# Patient Record
Sex: FEMALE | Race: WHITE | NOT HISPANIC OR LATINO | URBAN - METROPOLITAN AREA
[De-identification: names, ages, dates, MRNs, and addresses within clinical notes are randomized per-mention and may not be internally consistent; named-entity substitution may affect disease eponyms.]

---

## 2017-06-05 ENCOUNTER — INPATIENT (INPATIENT)
Facility: HOSPITAL | Age: 67
LOS: 8 days | Discharge: NURSING, REHAB OR LONG-TERM CARE | End: 2017-06-14
Attending: INTERNAL MEDICINE | Admitting: INTERNAL MEDICINE

## 2017-06-05 DIAGNOSIS — R07.9 CHEST PAIN, UNSPECIFIED: ICD-10-CM

## 2017-06-15 ENCOUNTER — OUTPATIENT (OUTPATIENT)
Dept: OUTPATIENT SERVICES | Facility: HOSPITAL | Age: 67
LOS: 1 days | Discharge: HOME | End: 2017-06-15

## 2017-06-15 DIAGNOSIS — R07.9 CHEST PAIN, UNSPECIFIED: ICD-10-CM

## 2017-06-15 PROBLEM — Z00.00 ENCOUNTER FOR PREVENTIVE HEALTH EXAMINATION: Status: ACTIVE | Noted: 2017-06-15

## 2017-06-17 ENCOUNTER — OUTPATIENT (OUTPATIENT)
Dept: OUTPATIENT SERVICES | Facility: HOSPITAL | Age: 67
LOS: 1 days | Discharge: HOME | End: 2017-06-17

## 2017-06-17 DIAGNOSIS — R07.9 CHEST PAIN, UNSPECIFIED: ICD-10-CM

## 2017-06-19 ENCOUNTER — OUTPATIENT (OUTPATIENT)
Dept: OUTPATIENT SERVICES | Facility: HOSPITAL | Age: 67
LOS: 1 days | Discharge: HOME | End: 2017-06-19

## 2017-06-19 DIAGNOSIS — R07.9 CHEST PAIN, UNSPECIFIED: ICD-10-CM

## 2017-06-22 ENCOUNTER — OUTPATIENT (OUTPATIENT)
Dept: OUTPATIENT SERVICES | Facility: HOSPITAL | Age: 67
LOS: 1 days | Discharge: HOME | End: 2017-06-22

## 2017-06-22 DIAGNOSIS — R07.9 CHEST PAIN, UNSPECIFIED: ICD-10-CM

## 2017-06-26 ENCOUNTER — OUTPATIENT (OUTPATIENT)
Dept: OUTPATIENT SERVICES | Facility: HOSPITAL | Age: 67
LOS: 1 days | Discharge: HOME | End: 2017-06-26

## 2017-06-26 DIAGNOSIS — R07.9 CHEST PAIN, UNSPECIFIED: ICD-10-CM

## 2017-06-28 ENCOUNTER — OUTPATIENT (OUTPATIENT)
Dept: OUTPATIENT SERVICES | Facility: HOSPITAL | Age: 67
LOS: 1 days | Discharge: HOME | End: 2017-06-28

## 2017-06-28 DIAGNOSIS — T50.8X5A ADVERSE EFFECT OF DIAGNOSTIC AGENTS, INITIAL ENCOUNTER: ICD-10-CM

## 2017-06-28 DIAGNOSIS — I42.9 CARDIOMYOPATHY, UNSPECIFIED: ICD-10-CM

## 2017-06-28 DIAGNOSIS — N17.0 ACUTE KIDNEY FAILURE WITH TUBULAR NECROSIS: ICD-10-CM

## 2017-06-28 DIAGNOSIS — J44.9 CHRONIC OBSTRUCTIVE PULMONARY DISEASE, UNSPECIFIED: ICD-10-CM

## 2017-06-28 DIAGNOSIS — E83.42 HYPOMAGNESEMIA: ICD-10-CM

## 2017-06-28 DIAGNOSIS — I13.0 HYPERTENSIVE HEART AND CHRONIC KIDNEY DISEASE WITH HEART FAILURE AND STAGE 1 THROUGH STAGE 4 CHRONIC KIDNEY DISEASE, OR UNSPECIFIED CHRONIC KIDNEY DISEASE: ICD-10-CM

## 2017-06-28 DIAGNOSIS — L40.9 PSORIASIS, UNSPECIFIED: ICD-10-CM

## 2017-06-28 DIAGNOSIS — E87.6 HYPOKALEMIA: ICD-10-CM

## 2017-06-28 DIAGNOSIS — N18.3 CHRONIC KIDNEY DISEASE, STAGE 3 (MODERATE): ICD-10-CM

## 2017-06-28 DIAGNOSIS — I21.09 ST ELEVATION (STEMI) MYOCARDIAL INFARCTION INVOLVING OTHER CORONARY ARTERY OF ANTERIOR WALL: ICD-10-CM

## 2017-06-28 DIAGNOSIS — Z90.710 ACQUIRED ABSENCE OF BOTH CERVIX AND UTERUS: ICD-10-CM

## 2017-06-28 DIAGNOSIS — M19.90 UNSPECIFIED OSTEOARTHRITIS, UNSPECIFIED SITE: ICD-10-CM

## 2017-06-28 DIAGNOSIS — R79.9 ABNORMAL FINDING OF BLOOD CHEMISTRY, UNSPECIFIED: ICD-10-CM

## 2017-06-28 DIAGNOSIS — E11.59 TYPE 2 DIABETES MELLITUS WITH OTHER CIRCULATORY COMPLICATIONS: ICD-10-CM

## 2017-06-28 DIAGNOSIS — B96.20 UNSPECIFIED ESCHERICHIA COLI [E. COLI] AS THE CAUSE OF DISEASES CLASSIFIED ELSEWHERE: ICD-10-CM

## 2017-06-28 DIAGNOSIS — N39.0 URINARY TRACT INFECTION, SITE NOT SPECIFIED: ICD-10-CM

## 2017-06-28 DIAGNOSIS — I50.21 ACUTE SYSTOLIC (CONGESTIVE) HEART FAILURE: ICD-10-CM

## 2017-06-28 DIAGNOSIS — Z79.84 LONG TERM (CURRENT) USE OF ORAL HYPOGLYCEMIC DRUGS: ICD-10-CM

## 2017-06-28 DIAGNOSIS — R07.9 CHEST PAIN, UNSPECIFIED: ICD-10-CM

## 2017-06-28 DIAGNOSIS — E66.01 MORBID (SEVERE) OBESITY DUE TO EXCESS CALORIES: ICD-10-CM

## 2017-06-28 DIAGNOSIS — D50.9 IRON DEFICIENCY ANEMIA, UNSPECIFIED: ICD-10-CM

## 2017-06-28 DIAGNOSIS — E11.22 TYPE 2 DIABETES MELLITUS WITH DIABETIC CHRONIC KIDNEY DISEASE: ICD-10-CM

## 2017-06-28 DIAGNOSIS — K57.90 DIVERTICULOSIS OF INTESTINE, PART UNSPECIFIED, WITHOUT PERFORATION OR ABSCESS WITHOUT BLEEDING: ICD-10-CM

## 2017-06-28 DIAGNOSIS — I25.10 ATHEROSCLEROTIC HEART DISEASE OF NATIVE CORONARY ARTERY WITHOUT ANGINA PECTORIS: ICD-10-CM

## 2017-06-29 ENCOUNTER — OUTPATIENT (OUTPATIENT)
Dept: OUTPATIENT SERVICES | Facility: HOSPITAL | Age: 67
LOS: 1 days | Discharge: HOME | End: 2017-06-29

## 2017-06-29 DIAGNOSIS — R07.9 CHEST PAIN, UNSPECIFIED: ICD-10-CM

## 2017-06-29 DIAGNOSIS — R79.9 ABNORMAL FINDING OF BLOOD CHEMISTRY, UNSPECIFIED: ICD-10-CM

## 2017-07-03 ENCOUNTER — OUTPATIENT (OUTPATIENT)
Dept: OUTPATIENT SERVICES | Facility: HOSPITAL | Age: 67
LOS: 1 days | Discharge: HOME | End: 2017-07-03

## 2017-07-03 DIAGNOSIS — R79.9 ABNORMAL FINDING OF BLOOD CHEMISTRY, UNSPECIFIED: ICD-10-CM

## 2017-07-03 DIAGNOSIS — R07.9 CHEST PAIN, UNSPECIFIED: ICD-10-CM

## 2017-07-06 ENCOUNTER — OUTPATIENT (OUTPATIENT)
Dept: OUTPATIENT SERVICES | Facility: HOSPITAL | Age: 67
LOS: 1 days | Discharge: HOME | End: 2017-07-06

## 2017-07-06 DIAGNOSIS — R07.9 CHEST PAIN, UNSPECIFIED: ICD-10-CM

## 2017-07-06 DIAGNOSIS — R79.9 ABNORMAL FINDING OF BLOOD CHEMISTRY, UNSPECIFIED: ICD-10-CM

## 2017-07-10 ENCOUNTER — OUTPATIENT (OUTPATIENT)
Dept: OUTPATIENT SERVICES | Facility: HOSPITAL | Age: 67
LOS: 1 days | Discharge: HOME | End: 2017-07-10

## 2017-07-10 DIAGNOSIS — R07.9 CHEST PAIN, UNSPECIFIED: ICD-10-CM

## 2017-07-10 DIAGNOSIS — L97.909 NON-PRESSURE CHRONIC ULCER OF UNSPECIFIED PART OF UNSPECIFIED LOWER LEG WITH UNSPECIFIED SEVERITY: ICD-10-CM

## 2017-07-10 DIAGNOSIS — R79.9 ABNORMAL FINDING OF BLOOD CHEMISTRY, UNSPECIFIED: ICD-10-CM

## 2017-07-17 ENCOUNTER — OUTPATIENT (OUTPATIENT)
Dept: OUTPATIENT SERVICES | Facility: HOSPITAL | Age: 67
LOS: 1 days | Discharge: HOME | End: 2017-07-17

## 2017-07-17 DIAGNOSIS — R07.9 CHEST PAIN, UNSPECIFIED: ICD-10-CM

## 2017-07-17 DIAGNOSIS — L97.909 NON-PRESSURE CHRONIC ULCER OF UNSPECIFIED PART OF UNSPECIFIED LOWER LEG WITH UNSPECIFIED SEVERITY: ICD-10-CM

## 2017-07-17 DIAGNOSIS — R79.9 ABNORMAL FINDING OF BLOOD CHEMISTRY, UNSPECIFIED: ICD-10-CM

## 2017-07-21 ENCOUNTER — APPOINTMENT (OUTPATIENT)
Dept: CARDIOLOGY | Facility: CLINIC | Age: 67
End: 2017-07-21

## 2017-07-21 VITALS
DIASTOLIC BLOOD PRESSURE: 64 MMHG | HEART RATE: 74 BPM | SYSTOLIC BLOOD PRESSURE: 112 MMHG | WEIGHT: 293 LBS | HEIGHT: 64 IN | BODY MASS INDEX: 50.02 KG/M2

## 2017-07-21 DIAGNOSIS — Z83.3 FAMILY HISTORY OF DIABETES MELLITUS: ICD-10-CM

## 2017-07-21 DIAGNOSIS — Z82.49 FAMILY HISTORY OF ISCHEMIC HEART DISEASE AND OTHER DISEASES OF THE CIRCULATORY SYSTEM: ICD-10-CM

## 2017-07-21 DIAGNOSIS — I25.2 OLD MYOCARDIAL INFARCTION: ICD-10-CM

## 2017-07-21 RX ORDER — MAGNESIUM OXIDE 500 MG
500 TABLET ORAL DAILY
Refills: 0 | Status: ACTIVE | COMMUNITY

## 2017-07-21 RX ORDER — OXYCODONE AND ACETAMINOPHEN 5; 325 MG/1; MG/1
5-325 TABLET ORAL
Refills: 0 | Status: ACTIVE | COMMUNITY

## 2017-07-21 RX ORDER — METOPROLOL TARTRATE 50 MG/1
50 TABLET, FILM COATED ORAL TWICE DAILY
Qty: 180 | Refills: 3 | Status: ACTIVE | COMMUNITY

## 2017-07-21 RX ORDER — ACETAMINOPHEN 325 MG/1
325 TABLET ORAL
Refills: 0 | Status: ACTIVE | COMMUNITY

## 2017-07-21 RX ORDER — FENOFIBRATE 134 MG/1
134 CAPSULE ORAL DAILY
Refills: 0 | Status: ACTIVE | COMMUNITY

## 2017-07-21 RX ORDER — ASPIRIN 81 MG
81 TABLET, DELAYED RELEASE (ENTERIC COATED) ORAL DAILY
Refills: 0 | Status: ACTIVE | COMMUNITY

## 2017-07-21 RX ORDER — BUSPIRONE HYDROCHLORIDE 5 MG/1
5 TABLET ORAL
Refills: 0 | Status: ACTIVE | COMMUNITY

## 2017-07-21 RX ORDER — CITALOPRAM HYDROBROMIDE 10 MG/1
10 TABLET, FILM COATED ORAL DAILY
Refills: 0 | Status: ACTIVE | COMMUNITY

## 2017-07-24 ENCOUNTER — OUTPATIENT (OUTPATIENT)
Dept: OUTPATIENT SERVICES | Facility: HOSPITAL | Age: 67
LOS: 1 days | Discharge: HOME | End: 2017-07-24

## 2017-07-24 DIAGNOSIS — R07.9 CHEST PAIN, UNSPECIFIED: ICD-10-CM

## 2017-07-24 DIAGNOSIS — R79.9 ABNORMAL FINDING OF BLOOD CHEMISTRY, UNSPECIFIED: ICD-10-CM

## 2017-07-24 DIAGNOSIS — L97.909 NON-PRESSURE CHRONIC ULCER OF UNSPECIFIED PART OF UNSPECIFIED LOWER LEG WITH UNSPECIFIED SEVERITY: ICD-10-CM

## 2017-07-25 ENCOUNTER — OUTPATIENT (OUTPATIENT)
Dept: OUTPATIENT SERVICES | Facility: HOSPITAL | Age: 67
LOS: 1 days | Discharge: HOME | End: 2017-07-25

## 2017-07-25 DIAGNOSIS — L97.909 NON-PRESSURE CHRONIC ULCER OF UNSPECIFIED PART OF UNSPECIFIED LOWER LEG WITH UNSPECIFIED SEVERITY: ICD-10-CM

## 2017-07-25 DIAGNOSIS — R07.9 CHEST PAIN, UNSPECIFIED: ICD-10-CM

## 2017-07-25 DIAGNOSIS — E87.5 HYPERKALEMIA: ICD-10-CM

## 2017-07-26 ENCOUNTER — APPOINTMENT (OUTPATIENT)
Dept: CARDIOLOGY | Facility: CLINIC | Age: 67
End: 2017-07-26

## 2017-07-27 DIAGNOSIS — E87.5 HYPERKALEMIA: ICD-10-CM

## 2017-07-27 DIAGNOSIS — I21.3 ST ELEVATION (STEMI) MYOCARDIAL INFARCTION OF UNSPECIFIED SITE: ICD-10-CM

## 2017-07-31 ENCOUNTER — OUTPATIENT (OUTPATIENT)
Dept: OUTPATIENT SERVICES | Facility: HOSPITAL | Age: 67
LOS: 1 days | Discharge: HOME | End: 2017-07-31

## 2017-07-31 DIAGNOSIS — R79.9 ABNORMAL FINDING OF BLOOD CHEMISTRY, UNSPECIFIED: ICD-10-CM

## 2017-07-31 DIAGNOSIS — L97.909 NON-PRESSURE CHRONIC ULCER OF UNSPECIFIED PART OF UNSPECIFIED LOWER LEG WITH UNSPECIFIED SEVERITY: ICD-10-CM

## 2017-07-31 DIAGNOSIS — R07.9 CHEST PAIN, UNSPECIFIED: ICD-10-CM

## 2017-08-07 ENCOUNTER — OUTPATIENT (OUTPATIENT)
Dept: OUTPATIENT SERVICES | Facility: HOSPITAL | Age: 67
LOS: 1 days | Discharge: HOME | End: 2017-08-07

## 2017-08-07 DIAGNOSIS — R07.9 CHEST PAIN, UNSPECIFIED: ICD-10-CM

## 2017-08-07 DIAGNOSIS — R79.9 ABNORMAL FINDING OF BLOOD CHEMISTRY, UNSPECIFIED: ICD-10-CM

## 2017-08-07 DIAGNOSIS — L97.909 NON-PRESSURE CHRONIC ULCER OF UNSPECIFIED PART OF UNSPECIFIED LOWER LEG WITH UNSPECIFIED SEVERITY: ICD-10-CM

## 2017-08-14 ENCOUNTER — OUTPATIENT (OUTPATIENT)
Dept: OUTPATIENT SERVICES | Facility: HOSPITAL | Age: 67
LOS: 1 days | Discharge: HOME | End: 2017-08-14

## 2017-08-14 DIAGNOSIS — R79.9 ABNORMAL FINDING OF BLOOD CHEMISTRY, UNSPECIFIED: ICD-10-CM

## 2017-08-14 DIAGNOSIS — L97.909 NON-PRESSURE CHRONIC ULCER OF UNSPECIFIED PART OF UNSPECIFIED LOWER LEG WITH UNSPECIFIED SEVERITY: ICD-10-CM

## 2017-08-14 DIAGNOSIS — R07.9 CHEST PAIN, UNSPECIFIED: ICD-10-CM

## 2017-08-21 ENCOUNTER — OUTPATIENT (OUTPATIENT)
Dept: OUTPATIENT SERVICES | Facility: HOSPITAL | Age: 67
LOS: 1 days | Discharge: HOME | End: 2017-08-21

## 2017-08-21 DIAGNOSIS — L97.909 NON-PRESSURE CHRONIC ULCER OF UNSPECIFIED PART OF UNSPECIFIED LOWER LEG WITH UNSPECIFIED SEVERITY: ICD-10-CM

## 2017-08-21 DIAGNOSIS — R07.9 CHEST PAIN, UNSPECIFIED: ICD-10-CM

## 2017-08-21 DIAGNOSIS — R79.9 ABNORMAL FINDING OF BLOOD CHEMISTRY, UNSPECIFIED: ICD-10-CM

## 2017-08-26 RX ORDER — CLOPIDOGREL BISULFATE 75 MG/1
75 TABLET, FILM COATED ORAL DAILY
Qty: 90 | Refills: 3 | Status: ACTIVE | COMMUNITY
Start: 2017-08-26 | End: 1900-01-01

## 2017-11-10 ENCOUNTER — APPOINTMENT (OUTPATIENT)
Dept: CARDIOLOGY | Facility: CLINIC | Age: 67
End: 2017-11-10

## 2017-11-27 ENCOUNTER — INPATIENT (INPATIENT)
Facility: HOSPITAL | Age: 67
LOS: 6 days | Discharge: NURSING, REHAB OR LONG-TERM CARE | End: 2017-12-04
Attending: INTERNAL MEDICINE | Admitting: INTERNAL MEDICINE

## 2017-11-27 DIAGNOSIS — L97.909 NON-PRESSURE CHRONIC ULCER OF UNSPECIFIED PART OF UNSPECIFIED LOWER LEG WITH UNSPECIFIED SEVERITY: ICD-10-CM

## 2017-11-27 DIAGNOSIS — R07.9 CHEST PAIN, UNSPECIFIED: ICD-10-CM

## 2017-12-07 DIAGNOSIS — R06.02 SHORTNESS OF BREATH: ICD-10-CM

## 2017-12-07 DIAGNOSIS — E66.01 MORBID (SEVERE) OBESITY DUE TO EXCESS CALORIES: ICD-10-CM

## 2017-12-07 DIAGNOSIS — R26.0 ATAXIC GAIT: ICD-10-CM

## 2017-12-07 DIAGNOSIS — I50.32 CHRONIC DIASTOLIC (CONGESTIVE) HEART FAILURE: ICD-10-CM

## 2017-12-07 DIAGNOSIS — K57.90 DIVERTICULOSIS OF INTESTINE, PART UNSPECIFIED, WITHOUT PERFORATION OR ABSCESS WITHOUT BLEEDING: ICD-10-CM

## 2017-12-07 DIAGNOSIS — E11.22 TYPE 2 DIABETES MELLITUS WITH DIABETIC CHRONIC KIDNEY DISEASE: ICD-10-CM

## 2017-12-07 DIAGNOSIS — N18.3 CHRONIC KIDNEY DISEASE, STAGE 3 (MODERATE): ICD-10-CM

## 2017-12-07 DIAGNOSIS — M16.11 UNILATERAL PRIMARY OSTEOARTHRITIS, RIGHT HIP: ICD-10-CM

## 2017-12-07 DIAGNOSIS — J44.9 CHRONIC OBSTRUCTIVE PULMONARY DISEASE, UNSPECIFIED: ICD-10-CM

## 2017-12-07 DIAGNOSIS — R53.81 OTHER MALAISE: ICD-10-CM

## 2017-12-07 DIAGNOSIS — I25.10 ATHEROSCLEROTIC HEART DISEASE OF NATIVE CORONARY ARTERY WITHOUT ANGINA PECTORIS: ICD-10-CM

## 2017-12-07 DIAGNOSIS — I87.8 OTHER SPECIFIED DISORDERS OF VEINS: ICD-10-CM

## 2017-12-07 DIAGNOSIS — L97.911 NON-PRESSURE CHRONIC ULCER OF UNSPECIFIED PART OF RIGHT LOWER LEG LIMITED TO BREAKDOWN OF SKIN: ICD-10-CM

## 2017-12-07 DIAGNOSIS — G89.29 OTHER CHRONIC PAIN: ICD-10-CM

## 2017-12-07 DIAGNOSIS — L40.9 PSORIASIS, UNSPECIFIED: ICD-10-CM

## 2017-12-07 DIAGNOSIS — I13.0 HYPERTENSIVE HEART AND CHRONIC KIDNEY DISEASE WITH HEART FAILURE AND STAGE 1 THROUGH STAGE 4 CHRONIC KIDNEY DISEASE, OR UNSPECIFIED CHRONIC KIDNEY DISEASE: ICD-10-CM

## 2017-12-07 DIAGNOSIS — I83.019 VARICOSE VEINS OF RIGHT LOWER EXTREMITY WITH ULCER OF UNSPECIFIED SITE: ICD-10-CM

## 2017-12-07 DIAGNOSIS — Z99.3 DEPENDENCE ON WHEELCHAIR: ICD-10-CM

## 2017-12-07 DIAGNOSIS — N18.4 CHRONIC KIDNEY DISEASE, STAGE 4 (SEVERE): ICD-10-CM

## 2017-12-07 DIAGNOSIS — I25.2 OLD MYOCARDIAL INFARCTION: ICD-10-CM

## 2017-12-09 DIAGNOSIS — I87.319 CHRONIC VENOUS HYPERTENSION (IDIOPATHIC) WITH ULCER OF UNSPECIFIED LOWER EXTREMITY: ICD-10-CM

## 2017-12-22 ENCOUNTER — APPOINTMENT (OUTPATIENT)
Dept: CARDIOLOGY | Facility: CLINIC | Age: 67
End: 2017-12-22

## 2018-05-04 ENCOUNTER — APPOINTMENT (OUTPATIENT)
Dept: CARDIOLOGY | Facility: CLINIC | Age: 68
End: 2018-05-04

## 2018-05-04 VITALS
HEART RATE: 63 BPM | BODY MASS INDEX: 41.15 KG/M2 | HEIGHT: 64 IN | SYSTOLIC BLOOD PRESSURE: 116 MMHG | DIASTOLIC BLOOD PRESSURE: 70 MMHG | WEIGHT: 241 LBS

## 2018-05-04 RX ORDER — ATORVASTATIN CALCIUM 40 MG/1
40 TABLET, FILM COATED ORAL
Qty: 30 | Refills: 0 | Status: ACTIVE | COMMUNITY
Start: 2018-02-09

## 2018-05-04 RX ORDER — FOLIC ACID 1 MG/1
1 TABLET ORAL DAILY
Refills: 0 | Status: ACTIVE | COMMUNITY

## 2018-05-04 RX ORDER — LOSARTAN POTASSIUM 25 MG/1
25 TABLET, FILM COATED ORAL DAILY
Qty: 30 | Refills: 3 | Status: DISCONTINUED | COMMUNITY
Start: 2017-07-21 | End: 2018-05-04

## 2018-05-04 RX ORDER — CETIRIZINE HYDROCHLORIDE 10 MG/1
10 CAPSULE, LIQUID FILLED ORAL DAILY
Refills: 0 | Status: ACTIVE | COMMUNITY

## 2018-05-04 RX ORDER — FUROSEMIDE 20 MG/1
20 TABLET ORAL DAILY
Qty: 90 | Refills: 3 | Status: DISCONTINUED | COMMUNITY
End: 2018-05-04

## 2018-05-04 RX ORDER — GLIPIZIDE 10 MG/1
10 TABLET ORAL DAILY
Refills: 0 | Status: DISCONTINUED | COMMUNITY
End: 2018-05-04

## 2018-05-04 RX ORDER — FLUTICASONE PROPIONATE AND SALMETEROL 50; 250 UG/1; UG/1
250-50 POWDER RESPIRATORY (INHALATION)
Qty: 180 | Refills: 0 | Status: ACTIVE | COMMUNITY
Start: 2017-09-20

## 2018-05-04 RX ORDER — ALBUTEROL SULFATE 90 UG/1
108 (90 BASE) AEROSOL, METERED RESPIRATORY (INHALATION)
Qty: 25 | Refills: 0 | Status: ACTIVE | COMMUNITY
Start: 2017-09-20

## 2018-05-04 RX ORDER — FLUTICASONE FUROATE AND VILANTEROL TRIFENATATE 100; 25 UG/1; UG/1
100-25 POWDER RESPIRATORY (INHALATION)
Qty: 60 | Refills: 0 | Status: ACTIVE | COMMUNITY
Start: 2016-06-20

## 2018-05-04 RX ORDER — OXYCODONE HYDROCHLORIDE 10 MG/1
10 TABLET, EXTENDED RELEASE ORAL
Qty: 28 | Refills: 0 | Status: ACTIVE | COMMUNITY
Start: 2018-02-09

## 2018-05-04 RX ORDER — SENNOSIDES 8.6 MG
8.6 TABLET ORAL
Refills: 0 | Status: ACTIVE | COMMUNITY

## 2018-05-04 RX ORDER — OXYCODONE HYDROCHLORIDE 10 MG/1
10 TABLET, FILM COATED, EXTENDED RELEASE ORAL
Refills: 0 | Status: ACTIVE | COMMUNITY

## 2018-11-02 ENCOUNTER — APPOINTMENT (OUTPATIENT)
Dept: CARDIOLOGY | Facility: CLINIC | Age: 68
End: 2018-11-02

## 2018-11-02 VITALS
BODY MASS INDEX: 38.41 KG/M2 | HEIGHT: 64 IN | HEART RATE: 61 BPM | WEIGHT: 225 LBS | DIASTOLIC BLOOD PRESSURE: 80 MMHG | SYSTOLIC BLOOD PRESSURE: 138 MMHG

## 2018-11-02 DIAGNOSIS — E11.9 TYPE 2 DIABETES MELLITUS W/OUT COMPLICATIONS: ICD-10-CM

## 2018-11-02 DIAGNOSIS — I10 ESSENTIAL (PRIMARY) HYPERTENSION: ICD-10-CM

## 2018-11-02 DIAGNOSIS — I25.10 ATHEROSCLEROTIC HEART DISEASE OF NATIVE CORONARY ARTERY W/OUT ANGINA PECTORIS: ICD-10-CM

## 2018-11-02 DIAGNOSIS — E78.5 HYPERLIPIDEMIA, UNSPECIFIED: ICD-10-CM

## 2019-01-01 ENCOUNTER — OUTPATIENT (OUTPATIENT)
Dept: OUTPATIENT SERVICES | Facility: HOSPITAL | Age: 69
LOS: 1 days | Discharge: HOME | End: 2019-01-01

## 2019-01-01 ENCOUNTER — APPOINTMENT (OUTPATIENT)
Dept: CARDIOLOGY | Facility: CLINIC | Age: 69
End: 2019-01-01

## 2019-01-01 DIAGNOSIS — I10 ESSENTIAL (PRIMARY) HYPERTENSION: ICD-10-CM

## 2019-01-01 DIAGNOSIS — E78.2 MIXED HYPERLIPIDEMIA: ICD-10-CM

## 2019-01-01 DIAGNOSIS — E11.9 TYPE 2 DIABETES MELLITUS WITHOUT COMPLICATIONS: ICD-10-CM

## 2019-01-01 DIAGNOSIS — D64.9 ANEMIA, UNSPECIFIED: ICD-10-CM

## 2019-01-01 DIAGNOSIS — R79.9 ABNORMAL FINDING OF BLOOD CHEMISTRY, UNSPECIFIED: ICD-10-CM

## 2019-04-01 ENCOUNTER — OUTPATIENT (OUTPATIENT)
Dept: OUTPATIENT SERVICES | Facility: HOSPITAL | Age: 69
LOS: 1 days | Discharge: HOME | End: 2019-04-01

## 2019-04-01 DIAGNOSIS — E11.9 TYPE 2 DIABETES MELLITUS WITHOUT COMPLICATIONS: ICD-10-CM

## 2019-04-01 DIAGNOSIS — E66.01 MORBID (SEVERE) OBESITY DUE TO EXCESS CALORIES: ICD-10-CM

## 2019-04-01 DIAGNOSIS — I10 ESSENTIAL (PRIMARY) HYPERTENSION: ICD-10-CM

## 2019-05-03 ENCOUNTER — APPOINTMENT (OUTPATIENT)
Dept: CARDIOLOGY | Facility: CLINIC | Age: 69
End: 2019-05-03

## 2020-01-01 ENCOUNTER — APPOINTMENT (OUTPATIENT)
Dept: CARDIOLOGY | Facility: CLINIC | Age: 70
End: 2020-01-01

## 2020-01-01 ENCOUNTER — RESULT REVIEW (OUTPATIENT)
Age: 70
End: 2020-01-01

## 2020-01-01 ENCOUNTER — INPATIENT (INPATIENT)
Facility: HOSPITAL | Age: 70
LOS: 20 days | End: 2020-05-04
Attending: INTERNAL MEDICINE | Admitting: INTERNAL MEDICINE
Payer: MEDICARE

## 2020-01-01 VITALS
SYSTOLIC BLOOD PRESSURE: 153 MMHG | DIASTOLIC BLOOD PRESSURE: 89 MMHG | OXYGEN SATURATION: 86 % | RESPIRATION RATE: 25 BRPM | HEART RATE: 117 BPM | TEMPERATURE: 98 F

## 2020-01-01 VITALS — HEART RATE: 34 BPM

## 2020-01-01 DIAGNOSIS — J15.6 PNEUMONIA DUE TO OTHER GRAM-NEGATIVE BACTERIA: ICD-10-CM

## 2020-01-01 DIAGNOSIS — J12.89 OTHER VIRAL PNEUMONIA: ICD-10-CM

## 2020-01-01 DIAGNOSIS — U07.1 COVID-19: ICD-10-CM

## 2020-01-01 DIAGNOSIS — I25.10 ATHEROSCLEROTIC HEART DISEASE OF NATIVE CORONARY ARTERY WITHOUT ANGINA PECTORIS: ICD-10-CM

## 2020-01-01 DIAGNOSIS — T38.0X5A ADVERSE EFFECT OF GLUCOCORTICOIDS AND SYNTHETIC ANALOGUES, INITIAL ENCOUNTER: ICD-10-CM

## 2020-01-01 DIAGNOSIS — I25.2 OLD MYOCARDIAL INFARCTION: ICD-10-CM

## 2020-01-01 DIAGNOSIS — J45.901 UNSPECIFIED ASTHMA WITH (ACUTE) EXACERBATION: ICD-10-CM

## 2020-01-01 DIAGNOSIS — T50.995A ADVERSE EFFECT OF OTHER DRUGS, MEDICAMENTS AND BIOLOGICAL SUBSTANCES, INITIAL ENCOUNTER: ICD-10-CM

## 2020-01-01 DIAGNOSIS — N17.0 ACUTE KIDNEY FAILURE WITH TUBULAR NECROSIS: ICD-10-CM

## 2020-01-01 DIAGNOSIS — I50.23 ACUTE ON CHRONIC SYSTOLIC (CONGESTIVE) HEART FAILURE: ICD-10-CM

## 2020-01-01 DIAGNOSIS — I48.0 PAROXYSMAL ATRIAL FIBRILLATION: ICD-10-CM

## 2020-01-01 DIAGNOSIS — E78.5 HYPERLIPIDEMIA, UNSPECIFIED: ICD-10-CM

## 2020-01-01 DIAGNOSIS — I26.99 OTHER PULMONARY EMBOLISM WITHOUT ACUTE COR PULMONALE: ICD-10-CM

## 2020-01-01 DIAGNOSIS — R65.21 SEVERE SEPSIS WITH SEPTIC SHOCK: ICD-10-CM

## 2020-01-01 DIAGNOSIS — R79.1 ABNORMAL COAGULATION PROFILE: ICD-10-CM

## 2020-01-01 DIAGNOSIS — I11.0 HYPERTENSIVE HEART DISEASE WITH HEART FAILURE: ICD-10-CM

## 2020-01-01 DIAGNOSIS — E87.4 MIXED DISORDER OF ACID-BASE BALANCE: ICD-10-CM

## 2020-01-01 DIAGNOSIS — Z95.5 PRESENCE OF CORONARY ANGIOPLASTY IMPLANT AND GRAFT: ICD-10-CM

## 2020-01-01 DIAGNOSIS — E87.5 HYPERKALEMIA: ICD-10-CM

## 2020-01-01 DIAGNOSIS — I27.20 PULMONARY HYPERTENSION, UNSPECIFIED: ICD-10-CM

## 2020-01-01 DIAGNOSIS — J98.11 ATELECTASIS: ICD-10-CM

## 2020-01-01 DIAGNOSIS — D64.9 ANEMIA, UNSPECIFIED: ICD-10-CM

## 2020-01-01 DIAGNOSIS — A41.89 OTHER SPECIFIED SEPSIS: ICD-10-CM

## 2020-01-01 DIAGNOSIS — I45.10 UNSPECIFIED RIGHT BUNDLE-BRANCH BLOCK: ICD-10-CM

## 2020-01-01 DIAGNOSIS — J80 ACUTE RESPIRATORY DISTRESS SYNDROME: ICD-10-CM

## 2020-01-01 DIAGNOSIS — Z00.6 ENCOUNTER FOR EXAMINATION FOR NORMAL COMPARISON AND CONTROL IN CLINICAL RESEARCH PROGRAM: ICD-10-CM

## 2020-01-01 DIAGNOSIS — E11.9 TYPE 2 DIABETES MELLITUS WITHOUT COMPLICATIONS: ICD-10-CM

## 2020-01-01 LAB
A1C WITH ESTIMATED AVERAGE GLUCOSE RESULT: 5.8 % — HIGH (ref 4–5.6)
ABO RH CONFIRMATION: SIGNIFICANT CHANGE UP
ALBUMIN SERPL ELPH-MCNC: 2.3 G/DL — LOW (ref 3.5–5.2)
ALBUMIN SERPL ELPH-MCNC: 2.3 G/DL — LOW (ref 3.5–5.2)
ALBUMIN SERPL ELPH-MCNC: 2.4 G/DL — LOW (ref 3.5–5.2)
ALBUMIN SERPL ELPH-MCNC: 2.6 G/DL — LOW (ref 3.5–5.2)
ALBUMIN SERPL ELPH-MCNC: 2.7 G/DL — LOW (ref 3.5–5.2)
ALBUMIN SERPL ELPH-MCNC: 2.8 G/DL — LOW (ref 3.5–5.2)
ALBUMIN SERPL ELPH-MCNC: 2.9 G/DL — LOW (ref 3.5–5.2)
ALBUMIN SERPL ELPH-MCNC: 2.9 G/DL — LOW (ref 3.5–5.2)
ALBUMIN SERPL ELPH-MCNC: 3 G/DL — LOW (ref 3.5–5.2)
ALBUMIN SERPL ELPH-MCNC: 3.1 G/DL — LOW (ref 3.5–5.2)
ALBUMIN SERPL ELPH-MCNC: 3.3 G/DL — LOW (ref 3.5–5.2)
ALBUMIN SERPL ELPH-MCNC: 3.4 G/DL — LOW (ref 3.5–5.2)
ALBUMIN SERPL ELPH-MCNC: 3.4 G/DL — LOW (ref 3.5–5.2)
ALBUMIN SERPL ELPH-MCNC: 3.5 G/DL — SIGNIFICANT CHANGE UP (ref 3.5–5.2)
ALBUMIN SERPL ELPH-MCNC: 3.7 G/DL — SIGNIFICANT CHANGE UP (ref 3.5–5.2)
ALBUMIN SERPL ELPH-MCNC: 3.8 G/DL — SIGNIFICANT CHANGE UP (ref 3.5–5.2)
ALP SERPL-CCNC: 39 U/L — SIGNIFICANT CHANGE UP (ref 30–115)
ALP SERPL-CCNC: 41 U/L — SIGNIFICANT CHANGE UP (ref 30–115)
ALP SERPL-CCNC: 41 U/L — SIGNIFICANT CHANGE UP (ref 30–115)
ALP SERPL-CCNC: 42 U/L — SIGNIFICANT CHANGE UP (ref 30–115)
ALP SERPL-CCNC: 43 U/L — SIGNIFICANT CHANGE UP (ref 30–115)
ALP SERPL-CCNC: 45 U/L — SIGNIFICANT CHANGE UP (ref 30–115)
ALP SERPL-CCNC: 46 U/L — SIGNIFICANT CHANGE UP (ref 30–115)
ALP SERPL-CCNC: 47 U/L — SIGNIFICANT CHANGE UP (ref 30–115)
ALP SERPL-CCNC: 47 U/L — SIGNIFICANT CHANGE UP (ref 30–115)
ALP SERPL-CCNC: 48 U/L — SIGNIFICANT CHANGE UP (ref 30–115)
ALP SERPL-CCNC: 49 U/L — SIGNIFICANT CHANGE UP (ref 30–115)
ALP SERPL-CCNC: 51 U/L — SIGNIFICANT CHANGE UP (ref 30–115)
ALP SERPL-CCNC: 51 U/L — SIGNIFICANT CHANGE UP (ref 30–115)
ALP SERPL-CCNC: 52 U/L — SIGNIFICANT CHANGE UP (ref 30–115)
ALP SERPL-CCNC: 53 U/L — SIGNIFICANT CHANGE UP (ref 30–115)
ALP SERPL-CCNC: 56 U/L — SIGNIFICANT CHANGE UP (ref 30–115)
ALP SERPL-CCNC: 56 U/L — SIGNIFICANT CHANGE UP (ref 30–115)
ALP SERPL-CCNC: 57 U/L — SIGNIFICANT CHANGE UP (ref 30–115)
ALP SERPL-CCNC: 58 U/L — SIGNIFICANT CHANGE UP (ref 30–115)
ALP SERPL-CCNC: 60 U/L — SIGNIFICANT CHANGE UP (ref 30–115)
ALP SERPL-CCNC: 79 U/L — SIGNIFICANT CHANGE UP (ref 30–115)
ALT FLD-CCNC: 10 U/L — SIGNIFICANT CHANGE UP (ref 0–41)
ALT FLD-CCNC: 11 U/L — SIGNIFICANT CHANGE UP (ref 0–41)
ALT FLD-CCNC: 12 U/L — SIGNIFICANT CHANGE UP (ref 0–41)
ALT FLD-CCNC: 13 U/L — SIGNIFICANT CHANGE UP (ref 0–41)
ALT FLD-CCNC: 14 U/L — SIGNIFICANT CHANGE UP (ref 0–41)
ALT FLD-CCNC: 14 U/L — SIGNIFICANT CHANGE UP (ref 0–41)
ALT FLD-CCNC: 15 U/L — SIGNIFICANT CHANGE UP (ref 0–41)
ALT FLD-CCNC: 15 U/L — SIGNIFICANT CHANGE UP (ref 0–41)
ALT FLD-CCNC: 7 U/L — SIGNIFICANT CHANGE UP (ref 0–41)
ALT FLD-CCNC: 8 U/L — SIGNIFICANT CHANGE UP (ref 0–41)
ALT FLD-CCNC: 8 U/L — SIGNIFICANT CHANGE UP (ref 0–41)
ALT FLD-CCNC: 9 U/L — SIGNIFICANT CHANGE UP (ref 0–41)
ALT FLD-CCNC: 9 U/L — SIGNIFICANT CHANGE UP (ref 0–41)
ANION GAP SERPL CALC-SCNC: 12 MMOL/L — SIGNIFICANT CHANGE UP (ref 7–14)
ANION GAP SERPL CALC-SCNC: 12 MMOL/L — SIGNIFICANT CHANGE UP (ref 7–14)
ANION GAP SERPL CALC-SCNC: 13 MMOL/L — SIGNIFICANT CHANGE UP (ref 7–14)
ANION GAP SERPL CALC-SCNC: 13 MMOL/L — SIGNIFICANT CHANGE UP (ref 7–14)
ANION GAP SERPL CALC-SCNC: 14 MMOL/L — SIGNIFICANT CHANGE UP (ref 7–14)
ANION GAP SERPL CALC-SCNC: 14 MMOL/L — SIGNIFICANT CHANGE UP (ref 7–14)
ANION GAP SERPL CALC-SCNC: 15 MMOL/L — HIGH (ref 7–14)
ANION GAP SERPL CALC-SCNC: 16 MMOL/L — HIGH (ref 7–14)
ANION GAP SERPL CALC-SCNC: 17 MMOL/L — HIGH (ref 7–14)
ANION GAP SERPL CALC-SCNC: 17 MMOL/L — HIGH (ref 7–14)
ANION GAP SERPL CALC-SCNC: 18 MMOL/L — HIGH (ref 7–14)
ANION GAP SERPL CALC-SCNC: 19 MMOL/L — HIGH (ref 7–14)
ANION GAP SERPL CALC-SCNC: 20 MMOL/L — HIGH (ref 7–14)
ANION GAP SERPL CALC-SCNC: 21 MMOL/L — HIGH (ref 7–14)
ANION GAP SERPL CALC-SCNC: 22 MMOL/L — HIGH (ref 7–14)
ANION GAP SERPL CALC-SCNC: 23 MMOL/L — HIGH (ref 7–14)
ANION GAP SERPL CALC-SCNC: 24 MMOL/L — HIGH (ref 7–14)
ANISOCYTOSIS BLD QL: SIGNIFICANT CHANGE UP
ANISOCYTOSIS BLD QL: SLIGHT — SIGNIFICANT CHANGE UP
APPEARANCE UR: ABNORMAL
APPEARANCE UR: CLEAR — SIGNIFICANT CHANGE UP
APTT BLD: 195.7 SEC — CRITICAL HIGH (ref 27–39.2)
APTT BLD: 27.5 SEC — SIGNIFICANT CHANGE UP (ref 27–39.2)
APTT BLD: 28.7 SEC — SIGNIFICANT CHANGE UP (ref 27–39.2)
APTT BLD: 30.1 SEC — SIGNIFICANT CHANGE UP (ref 27–39.2)
APTT BLD: 43.1 SEC — HIGH (ref 27–39.2)
APTT BLD: 44.9 SEC — HIGH (ref 27–39.2)
APTT BLD: 53.7 SEC — HIGH (ref 27–39.2)
APTT BLD: 54.8 SEC — HIGH (ref 27–39.2)
APTT BLD: 56.4 SEC — HIGH (ref 27–39.2)
APTT BLD: 58.5 SEC — HIGH (ref 27–39.2)
APTT BLD: 58.6 SEC — HIGH (ref 27–39.2)
APTT BLD: 58.9 SEC — HIGH (ref 27–39.2)
APTT BLD: 59.6 SEC — HIGH (ref 27–39.2)
APTT BLD: 61.7 SEC — HIGH (ref 27–39.2)
APTT BLD: 62.8 SEC — HIGH (ref 27–39.2)
APTT BLD: 64.5 SEC — HIGH (ref 27–39.2)
APTT BLD: 64.8 SEC — HIGH (ref 27–39.2)
APTT BLD: 66.4 SEC — HIGH (ref 27–39.2)
APTT BLD: 73.4 SEC — CRITICAL HIGH (ref 27–39.2)
APTT BLD: 73.6 SEC — CRITICAL HIGH (ref 27–39.2)
APTT BLD: 77 SEC — CRITICAL HIGH (ref 27–39.2)
APTT BLD: 83.2 SEC — CRITICAL HIGH (ref 27–39.2)
APTT BLD: 83.7 SEC — CRITICAL HIGH (ref 27–39.2)
APTT BLD: 85.8 SEC — CRITICAL HIGH (ref 27–39.2)
APTT BLD: >200 SEC — CRITICAL HIGH (ref 27–39.2)
AST SERPL-CCNC: 12 U/L — SIGNIFICANT CHANGE UP (ref 0–41)
AST SERPL-CCNC: 13 U/L — SIGNIFICANT CHANGE UP (ref 0–41)
AST SERPL-CCNC: 14 U/L — SIGNIFICANT CHANGE UP (ref 0–41)
AST SERPL-CCNC: 14 U/L — SIGNIFICANT CHANGE UP (ref 0–41)
AST SERPL-CCNC: 16 U/L — SIGNIFICANT CHANGE UP (ref 0–41)
AST SERPL-CCNC: 18 U/L — SIGNIFICANT CHANGE UP (ref 0–41)
AST SERPL-CCNC: 19 U/L — SIGNIFICANT CHANGE UP (ref 0–41)
AST SERPL-CCNC: 19 U/L — SIGNIFICANT CHANGE UP (ref 0–41)
AST SERPL-CCNC: 20 U/L — SIGNIFICANT CHANGE UP (ref 0–41)
AST SERPL-CCNC: 21 U/L — SIGNIFICANT CHANGE UP (ref 0–41)
AST SERPL-CCNC: 22 U/L — SIGNIFICANT CHANGE UP (ref 0–41)
AST SERPL-CCNC: 25 U/L — SIGNIFICANT CHANGE UP (ref 0–41)
AST SERPL-CCNC: 25 U/L — SIGNIFICANT CHANGE UP (ref 0–41)
AST SERPL-CCNC: 26 U/L — SIGNIFICANT CHANGE UP (ref 0–41)
AST SERPL-CCNC: 27 U/L — SIGNIFICANT CHANGE UP (ref 0–41)
AST SERPL-CCNC: 29 U/L — SIGNIFICANT CHANGE UP (ref 0–41)
AST SERPL-CCNC: 31 U/L — SIGNIFICANT CHANGE UP (ref 0–41)
AST SERPL-CCNC: 32 U/L — SIGNIFICANT CHANGE UP (ref 0–41)
AST SERPL-CCNC: 38 U/L — SIGNIFICANT CHANGE UP (ref 0–41)
BACTERIA # UR AUTO: ABNORMAL
BASE EXCESS BLDA CALC-SCNC: -0.5 MMOL/L — SIGNIFICANT CHANGE UP (ref -2–2)
BASE EXCESS BLDA CALC-SCNC: -0.6 MMOL/L — SIGNIFICANT CHANGE UP (ref -2–2)
BASE EXCESS BLDA CALC-SCNC: -0.7 MMOL/L — SIGNIFICANT CHANGE UP (ref -2–2)
BASE EXCESS BLDA CALC-SCNC: -10.1 MMOL/L — LOW (ref -2–2)
BASE EXCESS BLDA CALC-SCNC: -12.3 MMOL/L — LOW (ref -2–2)
BASE EXCESS BLDA CALC-SCNC: -13.9 MMOL/L — LOW (ref -2–2)
BASE EXCESS BLDA CALC-SCNC: -2.4 MMOL/L — LOW (ref -2–2)
BASE EXCESS BLDA CALC-SCNC: -2.4 MMOL/L — LOW (ref -2–2)
BASE EXCESS BLDA CALC-SCNC: -3 MMOL/L — LOW (ref -2–2)
BASE EXCESS BLDA CALC-SCNC: -4.5 MMOL/L — LOW (ref -2–2)
BASE EXCESS BLDA CALC-SCNC: -4.7 MMOL/L — LOW (ref -2–2)
BASE EXCESS BLDA CALC-SCNC: -4.7 MMOL/L — LOW (ref -2–2)
BASE EXCESS BLDA CALC-SCNC: -6.2 MMOL/L — LOW (ref -2–2)
BASE EXCESS BLDA CALC-SCNC: -6.3 MMOL/L — LOW (ref -2–2)
BASE EXCESS BLDA CALC-SCNC: -6.4 MMOL/L — LOW (ref -2–2)
BASE EXCESS BLDA CALC-SCNC: -9.8 MMOL/L — LOW (ref -2–2)
BASE EXCESS BLDA CALC-SCNC: 0.9 MMOL/L — SIGNIFICANT CHANGE UP (ref -2–2)
BASE EXCESS BLDA CALC-SCNC: 2.2 MMOL/L — HIGH (ref -2–2)
BASE EXCESS BLDA CALC-SCNC: 3.8 MMOL/L — HIGH (ref -2–2)
BASE EXCESS BLDA CALC-SCNC: 4.3 MMOL/L — HIGH (ref -2–2)
BASE EXCESS BLDA CALC-SCNC: 7.3 MMOL/L — HIGH (ref -2–2)
BASOPHILS # BLD AUTO: 0 K/UL — SIGNIFICANT CHANGE UP (ref 0–0.2)
BASOPHILS # BLD AUTO: 0.01 K/UL — SIGNIFICANT CHANGE UP (ref 0–0.2)
BASOPHILS # BLD AUTO: 0.02 K/UL — SIGNIFICANT CHANGE UP (ref 0–0.2)
BASOPHILS # BLD AUTO: 0.03 K/UL — SIGNIFICANT CHANGE UP (ref 0–0.2)
BASOPHILS # BLD AUTO: 0.04 K/UL — SIGNIFICANT CHANGE UP (ref 0–0.2)
BASOPHILS # BLD AUTO: 0.05 K/UL — SIGNIFICANT CHANGE UP (ref 0–0.2)
BASOPHILS # BLD AUTO: 0.07 K/UL — SIGNIFICANT CHANGE UP (ref 0–0.2)
BASOPHILS NFR BLD AUTO: 0 % — SIGNIFICANT CHANGE UP (ref 0–1)
BASOPHILS NFR BLD AUTO: 0.1 % — SIGNIFICANT CHANGE UP (ref 0–1)
BASOPHILS NFR BLD AUTO: 0.2 % — SIGNIFICANT CHANGE UP (ref 0–1)
BASOPHILS NFR BLD AUTO: 0.3 % — SIGNIFICANT CHANGE UP (ref 0–1)
BILIRUB DIRECT SERPL-MCNC: 0.5 MG/DL — HIGH (ref 0–0.2)
BILIRUB DIRECT SERPL-MCNC: 0.5 MG/DL — HIGH (ref 0–0.2)
BILIRUB INDIRECT FLD-MCNC: 0.1 MG/DL — LOW (ref 0.2–1.2)
BILIRUB INDIRECT FLD-MCNC: 0.1 MG/DL — LOW (ref 0.2–1.2)
BILIRUB SERPL-MCNC: 0.4 MG/DL — SIGNIFICANT CHANGE UP (ref 0.2–1.2)
BILIRUB SERPL-MCNC: 0.5 MG/DL — SIGNIFICANT CHANGE UP (ref 0.2–1.2)
BILIRUB SERPL-MCNC: 0.6 MG/DL — SIGNIFICANT CHANGE UP (ref 0.2–1.2)
BILIRUB SERPL-MCNC: 0.7 MG/DL — SIGNIFICANT CHANGE UP (ref 0.2–1.2)
BILIRUB SERPL-MCNC: 0.7 MG/DL — SIGNIFICANT CHANGE UP (ref 0.2–1.2)
BILIRUB SERPL-MCNC: 0.8 MG/DL — SIGNIFICANT CHANGE UP (ref 0.2–1.2)
BILIRUB SERPL-MCNC: 0.8 MG/DL — SIGNIFICANT CHANGE UP (ref 0.2–1.2)
BILIRUB SERPL-MCNC: 0.9 MG/DL — SIGNIFICANT CHANGE UP (ref 0.2–1.2)
BILIRUB UR-MCNC: NEGATIVE — SIGNIFICANT CHANGE UP
BLD GP AB SCN SERPL QL: SIGNIFICANT CHANGE UP
BLD GP AB SCN SERPL QL: SIGNIFICANT CHANGE UP
BUN SERPL-MCNC: 100 MG/DL — CRITICAL HIGH (ref 10–20)
BUN SERPL-MCNC: 104 MG/DL — CRITICAL HIGH (ref 10–20)
BUN SERPL-MCNC: 104 MG/DL — CRITICAL HIGH (ref 10–20)
BUN SERPL-MCNC: 116 MG/DL — CRITICAL HIGH (ref 10–20)
BUN SERPL-MCNC: 120 MG/DL — CRITICAL HIGH (ref 10–20)
BUN SERPL-MCNC: 120 MG/DL — CRITICAL HIGH (ref 10–20)
BUN SERPL-MCNC: 123 MG/DL — CRITICAL HIGH (ref 10–20)
BUN SERPL-MCNC: 124 MG/DL — CRITICAL HIGH (ref 10–20)
BUN SERPL-MCNC: 126 MG/DL — CRITICAL HIGH (ref 10–20)
BUN SERPL-MCNC: 40 MG/DL — HIGH (ref 10–20)
BUN SERPL-MCNC: 40 MG/DL — HIGH (ref 10–20)
BUN SERPL-MCNC: 42 MG/DL — HIGH (ref 10–20)
BUN SERPL-MCNC: 43 MG/DL — HIGH (ref 10–20)
BUN SERPL-MCNC: 51 MG/DL — HIGH (ref 10–20)
BUN SERPL-MCNC: 54 MG/DL — HIGH (ref 10–20)
BUN SERPL-MCNC: 57 MG/DL — HIGH (ref 10–20)
BUN SERPL-MCNC: 57 MG/DL — HIGH (ref 10–20)
BUN SERPL-MCNC: 67 MG/DL — CRITICAL HIGH (ref 10–20)
BUN SERPL-MCNC: 68 MG/DL — CRITICAL HIGH (ref 10–20)
BUN SERPL-MCNC: 71 MG/DL — CRITICAL HIGH (ref 10–20)
BUN SERPL-MCNC: 76 MG/DL — CRITICAL HIGH (ref 10–20)
BUN SERPL-MCNC: 78 MG/DL — CRITICAL HIGH (ref 10–20)
BUN SERPL-MCNC: 80 MG/DL — CRITICAL HIGH (ref 10–20)
BUN SERPL-MCNC: 81 MG/DL — CRITICAL HIGH (ref 10–20)
BUN SERPL-MCNC: 90 MG/DL — CRITICAL HIGH (ref 10–20)
BUN SERPL-MCNC: 90 MG/DL — CRITICAL HIGH (ref 10–20)
BUN SERPL-MCNC: 95 MG/DL — CRITICAL HIGH (ref 10–20)
BURR CELLS BLD QL SMEAR: PRESENT — SIGNIFICANT CHANGE UP
CALCIUM SERPL-MCNC: 10.4 MG/DL — HIGH (ref 8.5–10.1)
CALCIUM SERPL-MCNC: 7.3 MG/DL — LOW (ref 8.5–10.1)
CALCIUM SERPL-MCNC: 7.4 MG/DL — LOW (ref 8.5–10.1)
CALCIUM SERPL-MCNC: 7.6 MG/DL — LOW (ref 8.5–10.1)
CALCIUM SERPL-MCNC: 7.8 MG/DL — LOW (ref 8.5–10.1)
CALCIUM SERPL-MCNC: 7.9 MG/DL — LOW (ref 8.5–10.1)
CALCIUM SERPL-MCNC: 7.9 MG/DL — LOW (ref 8.5–10.1)
CALCIUM SERPL-MCNC: 8 MG/DL — LOW (ref 8.5–10.1)
CALCIUM SERPL-MCNC: 8.1 MG/DL — LOW (ref 8.5–10.1)
CALCIUM SERPL-MCNC: 8.1 MG/DL — LOW (ref 8.5–10.1)
CALCIUM SERPL-MCNC: 8.2 MG/DL — LOW (ref 8.5–10.1)
CALCIUM SERPL-MCNC: 8.3 MG/DL — LOW (ref 8.5–10.1)
CALCIUM SERPL-MCNC: 8.4 MG/DL — LOW (ref 8.5–10.1)
CALCIUM SERPL-MCNC: 8.4 MG/DL — LOW (ref 8.5–10.1)
CALCIUM SERPL-MCNC: 8.6 MG/DL — SIGNIFICANT CHANGE UP (ref 8.5–10.1)
CALCIUM SERPL-MCNC: 8.8 MG/DL — SIGNIFICANT CHANGE UP (ref 8.5–10.1)
CALCIUM SERPL-MCNC: 8.8 MG/DL — SIGNIFICANT CHANGE UP (ref 8.5–10.1)
CALCIUM SERPL-MCNC: 8.9 MG/DL — SIGNIFICANT CHANGE UP (ref 8.5–10.1)
CALCIUM SERPL-MCNC: 9 MG/DL — SIGNIFICANT CHANGE UP (ref 8.5–10.1)
CALCIUM SERPL-MCNC: 9.2 MG/DL — SIGNIFICANT CHANGE UP (ref 8.5–10.1)
CALCIUM SERPL-MCNC: 9.3 MG/DL — SIGNIFICANT CHANGE UP (ref 8.5–10.1)
CALCIUM SERPL-MCNC: 9.6 MG/DL — SIGNIFICANT CHANGE UP (ref 8.5–10.1)
CALCIUM SERPL-MCNC: 9.9 MG/DL — SIGNIFICANT CHANGE UP (ref 8.5–10.1)
CHLORIDE SERPL-SCNC: 100 MMOL/L — SIGNIFICANT CHANGE UP (ref 98–110)
CHLORIDE SERPL-SCNC: 100 MMOL/L — SIGNIFICANT CHANGE UP (ref 98–110)
CHLORIDE SERPL-SCNC: 101 MMOL/L — SIGNIFICANT CHANGE UP (ref 98–110)
CHLORIDE SERPL-SCNC: 105 MMOL/L — SIGNIFICANT CHANGE UP (ref 98–110)
CHLORIDE SERPL-SCNC: 106 MMOL/L — SIGNIFICANT CHANGE UP (ref 98–110)
CHLORIDE SERPL-SCNC: 106 MMOL/L — SIGNIFICANT CHANGE UP (ref 98–110)
CHLORIDE SERPL-SCNC: 107 MMOL/L — SIGNIFICANT CHANGE UP (ref 98–110)
CHLORIDE SERPL-SCNC: 108 MMOL/L — SIGNIFICANT CHANGE UP (ref 98–110)
CHLORIDE SERPL-SCNC: 109 MMOL/L — SIGNIFICANT CHANGE UP (ref 98–110)
CHLORIDE SERPL-SCNC: 110 MMOL/L — SIGNIFICANT CHANGE UP (ref 98–110)
CHLORIDE SERPL-SCNC: 118 MMOL/L — HIGH (ref 98–110)
CHLORIDE SERPL-SCNC: 90 MMOL/L — LOW (ref 98–110)
CHLORIDE SERPL-SCNC: 92 MMOL/L — LOW (ref 98–110)
CHLORIDE SERPL-SCNC: 93 MMOL/L — LOW (ref 98–110)
CHLORIDE SERPL-SCNC: 93 MMOL/L — LOW (ref 98–110)
CHLORIDE SERPL-SCNC: 94 MMOL/L — LOW (ref 98–110)
CHLORIDE SERPL-SCNC: 94 MMOL/L — LOW (ref 98–110)
CHLORIDE SERPL-SCNC: 95 MMOL/L — LOW (ref 98–110)
CHLORIDE SERPL-SCNC: 96 MMOL/L — LOW (ref 98–110)
CHLORIDE SERPL-SCNC: 97 MMOL/L — LOW (ref 98–110)
CHLORIDE SERPL-SCNC: 97 MMOL/L — LOW (ref 98–110)
CHLORIDE SERPL-SCNC: 98 MMOL/L — SIGNIFICANT CHANGE UP (ref 98–110)
CK SERPL-CCNC: 18 U/L — SIGNIFICANT CHANGE UP (ref 0–225)
CK SERPL-CCNC: 21 U/L — SIGNIFICANT CHANGE UP (ref 0–225)
CK SERPL-CCNC: 25 U/L — SIGNIFICANT CHANGE UP (ref 0–225)
CO2 SERPL-SCNC: 15 MMOL/L — LOW (ref 17–32)
CO2 SERPL-SCNC: 15 MMOL/L — LOW (ref 17–32)
CO2 SERPL-SCNC: 16 MMOL/L — LOW (ref 17–32)
CO2 SERPL-SCNC: 17 MMOL/L — SIGNIFICANT CHANGE UP (ref 17–32)
CO2 SERPL-SCNC: 18 MMOL/L — SIGNIFICANT CHANGE UP (ref 17–32)
CO2 SERPL-SCNC: 19 MMOL/L — SIGNIFICANT CHANGE UP (ref 17–32)
CO2 SERPL-SCNC: 20 MMOL/L — SIGNIFICANT CHANGE UP (ref 17–32)
CO2 SERPL-SCNC: 21 MMOL/L — SIGNIFICANT CHANGE UP (ref 17–32)
CO2 SERPL-SCNC: 21 MMOL/L — SIGNIFICANT CHANGE UP (ref 17–32)
CO2 SERPL-SCNC: 22 MMOL/L — SIGNIFICANT CHANGE UP (ref 17–32)
CO2 SERPL-SCNC: 23 MMOL/L — SIGNIFICANT CHANGE UP (ref 17–32)
CO2 SERPL-SCNC: 24 MMOL/L — SIGNIFICANT CHANGE UP (ref 17–32)
CO2 SERPL-SCNC: 25 MMOL/L — SIGNIFICANT CHANGE UP (ref 17–32)
CO2 SERPL-SCNC: 26 MMOL/L — SIGNIFICANT CHANGE UP (ref 17–32)
CO2 SERPL-SCNC: 26 MMOL/L — SIGNIFICANT CHANGE UP (ref 17–32)
CO2 SERPL-SCNC: 28 MMOL/L — SIGNIFICANT CHANGE UP (ref 17–32)
CO2 SERPL-SCNC: 29 MMOL/L — SIGNIFICANT CHANGE UP (ref 17–32)
CO2 SERPL-SCNC: 29 MMOL/L — SIGNIFICANT CHANGE UP (ref 17–32)
COLOR SPEC: SIGNIFICANT CHANGE UP
COLOR SPEC: YELLOW — SIGNIFICANT CHANGE UP
CREAT SERPL-MCNC: 0.7 MG/DL — SIGNIFICANT CHANGE UP (ref 0.7–1.5)
CREAT SERPL-MCNC: 0.8 MG/DL — SIGNIFICANT CHANGE UP (ref 0.7–1.5)
CREAT SERPL-MCNC: 0.9 MG/DL — SIGNIFICANT CHANGE UP (ref 0.7–1.5)
CREAT SERPL-MCNC: 1 MG/DL — SIGNIFICANT CHANGE UP (ref 0.7–1.5)
CREAT SERPL-MCNC: 1.1 MG/DL — SIGNIFICANT CHANGE UP (ref 0.7–1.5)
CREAT SERPL-MCNC: 1.1 MG/DL — SIGNIFICANT CHANGE UP (ref 0.7–1.5)
CREAT SERPL-MCNC: 1.2 MG/DL — SIGNIFICANT CHANGE UP (ref 0.7–1.5)
CREAT SERPL-MCNC: 1.5 MG/DL — SIGNIFICANT CHANGE UP (ref 0.7–1.5)
CREAT SERPL-MCNC: 1.6 MG/DL — HIGH (ref 0.7–1.5)
CREAT SERPL-MCNC: 2 MG/DL — HIGH (ref 0.7–1.5)
CREAT SERPL-MCNC: 2 MG/DL — HIGH (ref 0.7–1.5)
CREAT SERPL-MCNC: 2.2 MG/DL — HIGH (ref 0.7–1.5)
CREAT SERPL-MCNC: 2.4 MG/DL — HIGH (ref 0.7–1.5)
CREAT SERPL-MCNC: 2.5 MG/DL — HIGH (ref 0.7–1.5)
CRP SERPL-MCNC: 2.06 MG/DL — HIGH (ref 0–0.4)
CRP SERPL-MCNC: 2.64 MG/DL — HIGH (ref 0–0.4)
CRP SERPL-MCNC: 20.45 MG/DL — HIGH (ref 0–0.4)
CRP SERPL-MCNC: 24.36 MG/DL — HIGH (ref 0–0.4)
CRP SERPL-MCNC: 4.5 MG/DL — HIGH (ref 0–0.4)
CRP SERPL-MCNC: 6.43 MG/DL — HIGH (ref 0–0.4)
CRP SERPL-MCNC: 8.03 MG/DL — HIGH (ref 0–0.4)
CULTURE RESULTS: SIGNIFICANT CHANGE UP
D DIMER BLD IA.RAPID-MCNC: 2411 NG/ML DDU — HIGH (ref 0–230)
D DIMER BLD IA.RAPID-MCNC: 521 NG/ML DDU — HIGH (ref 0–230)
D DIMER BLD IA.RAPID-MCNC: 548 NG/ML DDU — HIGH (ref 0–230)
D DIMER BLD IA.RAPID-MCNC: 558 NG/ML DDU — HIGH (ref 0–230)
D DIMER BLD IA.RAPID-MCNC: 668 NG/ML DDU — HIGH (ref 0–230)
D DIMER BLD IA.RAPID-MCNC: 737 NG/ML DDU — HIGH (ref 0–230)
D DIMER BLD IA.RAPID-MCNC: 847 NG/ML DDU — HIGH (ref 0–230)
DIFF PNL FLD: NEGATIVE — SIGNIFICANT CHANGE UP
EOSINOPHIL # BLD AUTO: 0 K/UL — SIGNIFICANT CHANGE UP (ref 0–0.7)
EOSINOPHIL # BLD AUTO: 0.01 K/UL — SIGNIFICANT CHANGE UP (ref 0–0.7)
EOSINOPHIL # BLD AUTO: 0.02 K/UL — SIGNIFICANT CHANGE UP (ref 0–0.7)
EOSINOPHIL # BLD AUTO: 0.03 K/UL — SIGNIFICANT CHANGE UP (ref 0–0.7)
EOSINOPHIL # BLD AUTO: 0.04 K/UL — SIGNIFICANT CHANGE UP (ref 0–0.7)
EOSINOPHIL # BLD AUTO: 0.04 K/UL — SIGNIFICANT CHANGE UP (ref 0–0.7)
EOSINOPHIL # BLD AUTO: 0.05 K/UL — SIGNIFICANT CHANGE UP (ref 0–0.7)
EOSINOPHIL # BLD AUTO: 0.07 K/UL — SIGNIFICANT CHANGE UP (ref 0–0.7)
EOSINOPHIL # BLD AUTO: 0.07 K/UL — SIGNIFICANT CHANGE UP (ref 0–0.7)
EOSINOPHIL # BLD AUTO: 0.08 K/UL — SIGNIFICANT CHANGE UP (ref 0–0.7)
EOSINOPHIL # BLD AUTO: 0.09 K/UL — SIGNIFICANT CHANGE UP (ref 0–0.7)
EOSINOPHIL # BLD AUTO: 0.13 K/UL — SIGNIFICANT CHANGE UP (ref 0–0.7)
EOSINOPHIL # BLD AUTO: 0.2 K/UL — SIGNIFICANT CHANGE UP (ref 0–0.7)
EOSINOPHIL # BLD AUTO: 0.21 K/UL — SIGNIFICANT CHANGE UP (ref 0–0.7)
EOSINOPHIL NFR BLD AUTO: 0 % — SIGNIFICANT CHANGE UP (ref 0–8)
EOSINOPHIL NFR BLD AUTO: 0.2 % — SIGNIFICANT CHANGE UP (ref 0–8)
EOSINOPHIL NFR BLD AUTO: 0.3 % — SIGNIFICANT CHANGE UP (ref 0–8)
EOSINOPHIL NFR BLD AUTO: 0.4 % — SIGNIFICANT CHANGE UP (ref 0–8)
EOSINOPHIL NFR BLD AUTO: 0.5 % — SIGNIFICANT CHANGE UP (ref 0–8)
EOSINOPHIL NFR BLD AUTO: 0.5 % — SIGNIFICANT CHANGE UP (ref 0–8)
EOSINOPHIL NFR BLD AUTO: 0.7 % — SIGNIFICANT CHANGE UP (ref 0–8)
EOSINOPHIL NFR BLD AUTO: 0.9 % — SIGNIFICANT CHANGE UP (ref 0–8)
EOSINOPHIL NFR BLD AUTO: 0.9 % — SIGNIFICANT CHANGE UP (ref 0–8)
EOSINOPHIL NFR BLD AUTO: 1.1 % — SIGNIFICANT CHANGE UP (ref 0–8)
EOSINOPHIL NFR BLD AUTO: 1.1 % — SIGNIFICANT CHANGE UP (ref 0–8)
EOSINOPHIL NFR BLD AUTO: 1.3 % — SIGNIFICANT CHANGE UP (ref 0–8)
EOSINOPHIL NFR BLD AUTO: 1.7 % — SIGNIFICANT CHANGE UP (ref 0–8)
EPI CELLS # UR: 1 /HPF — SIGNIFICANT CHANGE UP (ref 0–5)
EPI CELLS # UR: 1 /HPF — SIGNIFICANT CHANGE UP (ref 0–5)
EPI CELLS # UR: 3 /HPF — SIGNIFICANT CHANGE UP (ref 0–5)
ESTIMATED AVERAGE GLUCOSE: 120 MG/DL — HIGH (ref 68–114)
FERRITIN SERPL-MCNC: 1102 NG/ML — HIGH (ref 15–150)
FERRITIN SERPL-MCNC: 1111 NG/ML — HIGH (ref 15–150)
FERRITIN SERPL-MCNC: 1227 NG/ML — HIGH (ref 15–150)
FERRITIN SERPL-MCNC: 1320 NG/ML — HIGH (ref 15–150)
FERRITIN SERPL-MCNC: 1599 NG/ML — HIGH (ref 15–150)
FERRITIN SERPL-MCNC: 890 NG/ML — HIGH (ref 15–150)
FIBRINOGEN PPP-MCNC: 472 MG/DL — SIGNIFICANT CHANGE UP (ref 204.4–570.6)
GAS PNL BLDA: SIGNIFICANT CHANGE UP
GIANT PLATELETS BLD QL SMEAR: PRESENT — SIGNIFICANT CHANGE UP
GLUCOSE BLDC GLUCOMTR-MCNC: 102 MG/DL — HIGH (ref 70–99)
GLUCOSE BLDC GLUCOMTR-MCNC: 108 MG/DL — HIGH (ref 70–99)
GLUCOSE BLDC GLUCOMTR-MCNC: 109 MG/DL — HIGH (ref 70–99)
GLUCOSE BLDC GLUCOMTR-MCNC: 110 MG/DL — HIGH (ref 70–99)
GLUCOSE BLDC GLUCOMTR-MCNC: 111 MG/DL — HIGH (ref 70–99)
GLUCOSE BLDC GLUCOMTR-MCNC: 112 MG/DL — HIGH (ref 70–99)
GLUCOSE BLDC GLUCOMTR-MCNC: 112 MG/DL — HIGH (ref 70–99)
GLUCOSE BLDC GLUCOMTR-MCNC: 113 MG/DL — HIGH (ref 70–99)
GLUCOSE BLDC GLUCOMTR-MCNC: 115 MG/DL — HIGH (ref 70–99)
GLUCOSE BLDC GLUCOMTR-MCNC: 116 MG/DL — HIGH (ref 70–99)
GLUCOSE BLDC GLUCOMTR-MCNC: 117 MG/DL — HIGH (ref 70–99)
GLUCOSE BLDC GLUCOMTR-MCNC: 117 MG/DL — HIGH (ref 70–99)
GLUCOSE BLDC GLUCOMTR-MCNC: 118 MG/DL — HIGH (ref 70–99)
GLUCOSE BLDC GLUCOMTR-MCNC: 118 MG/DL — HIGH (ref 70–99)
GLUCOSE BLDC GLUCOMTR-MCNC: 120 MG/DL — HIGH (ref 70–99)
GLUCOSE BLDC GLUCOMTR-MCNC: 122 MG/DL — HIGH (ref 70–99)
GLUCOSE BLDC GLUCOMTR-MCNC: 122 MG/DL — HIGH (ref 70–99)
GLUCOSE BLDC GLUCOMTR-MCNC: 123 MG/DL — HIGH (ref 70–99)
GLUCOSE BLDC GLUCOMTR-MCNC: 124 MG/DL — HIGH (ref 70–99)
GLUCOSE BLDC GLUCOMTR-MCNC: 125 MG/DL — HIGH (ref 70–99)
GLUCOSE BLDC GLUCOMTR-MCNC: 125 MG/DL — HIGH (ref 70–99)
GLUCOSE BLDC GLUCOMTR-MCNC: 126 MG/DL — HIGH (ref 70–99)
GLUCOSE BLDC GLUCOMTR-MCNC: 128 MG/DL — HIGH (ref 70–99)
GLUCOSE BLDC GLUCOMTR-MCNC: 129 MG/DL — HIGH (ref 70–99)
GLUCOSE BLDC GLUCOMTR-MCNC: 130 MG/DL — HIGH (ref 70–99)
GLUCOSE BLDC GLUCOMTR-MCNC: 130 MG/DL — HIGH (ref 70–99)
GLUCOSE BLDC GLUCOMTR-MCNC: 131 MG/DL — HIGH (ref 70–99)
GLUCOSE BLDC GLUCOMTR-MCNC: 132 MG/DL — HIGH (ref 70–99)
GLUCOSE BLDC GLUCOMTR-MCNC: 132 MG/DL — HIGH (ref 70–99)
GLUCOSE BLDC GLUCOMTR-MCNC: 133 MG/DL — HIGH (ref 70–99)
GLUCOSE BLDC GLUCOMTR-MCNC: 134 MG/DL — HIGH (ref 70–99)
GLUCOSE BLDC GLUCOMTR-MCNC: 135 MG/DL — HIGH (ref 70–99)
GLUCOSE BLDC GLUCOMTR-MCNC: 135 MG/DL — HIGH (ref 70–99)
GLUCOSE BLDC GLUCOMTR-MCNC: 136 MG/DL — HIGH (ref 70–99)
GLUCOSE BLDC GLUCOMTR-MCNC: 137 MG/DL — HIGH (ref 70–99)
GLUCOSE BLDC GLUCOMTR-MCNC: 137 MG/DL — HIGH (ref 70–99)
GLUCOSE BLDC GLUCOMTR-MCNC: 139 MG/DL — HIGH (ref 70–99)
GLUCOSE BLDC GLUCOMTR-MCNC: 140 MG/DL — HIGH (ref 70–99)
GLUCOSE BLDC GLUCOMTR-MCNC: 142 MG/DL — HIGH (ref 70–99)
GLUCOSE BLDC GLUCOMTR-MCNC: 143 MG/DL — HIGH (ref 70–99)
GLUCOSE BLDC GLUCOMTR-MCNC: 145 MG/DL — HIGH (ref 70–99)
GLUCOSE BLDC GLUCOMTR-MCNC: 146 MG/DL — HIGH (ref 70–99)
GLUCOSE BLDC GLUCOMTR-MCNC: 147 MG/DL — HIGH (ref 70–99)
GLUCOSE BLDC GLUCOMTR-MCNC: 148 MG/DL — HIGH (ref 70–99)
GLUCOSE BLDC GLUCOMTR-MCNC: 150 MG/DL — HIGH (ref 70–99)
GLUCOSE BLDC GLUCOMTR-MCNC: 151 MG/DL — HIGH (ref 70–99)
GLUCOSE BLDC GLUCOMTR-MCNC: 151 MG/DL — HIGH (ref 70–99)
GLUCOSE BLDC GLUCOMTR-MCNC: 152 MG/DL — HIGH (ref 70–99)
GLUCOSE BLDC GLUCOMTR-MCNC: 154 MG/DL — HIGH (ref 70–99)
GLUCOSE BLDC GLUCOMTR-MCNC: 155 MG/DL — HIGH (ref 70–99)
GLUCOSE BLDC GLUCOMTR-MCNC: 155 MG/DL — HIGH (ref 70–99)
GLUCOSE BLDC GLUCOMTR-MCNC: 156 MG/DL — HIGH (ref 70–99)
GLUCOSE BLDC GLUCOMTR-MCNC: 156 MG/DL — HIGH (ref 70–99)
GLUCOSE BLDC GLUCOMTR-MCNC: 157 MG/DL — HIGH (ref 70–99)
GLUCOSE BLDC GLUCOMTR-MCNC: 159 MG/DL — HIGH (ref 70–99)
GLUCOSE BLDC GLUCOMTR-MCNC: 163 MG/DL — HIGH (ref 70–99)
GLUCOSE BLDC GLUCOMTR-MCNC: 164 MG/DL — HIGH (ref 70–99)
GLUCOSE BLDC GLUCOMTR-MCNC: 164 MG/DL — HIGH (ref 70–99)
GLUCOSE BLDC GLUCOMTR-MCNC: 166 MG/DL — HIGH (ref 70–99)
GLUCOSE BLDC GLUCOMTR-MCNC: 166 MG/DL — HIGH (ref 70–99)
GLUCOSE BLDC GLUCOMTR-MCNC: 169 MG/DL — HIGH (ref 70–99)
GLUCOSE BLDC GLUCOMTR-MCNC: 170 MG/DL — HIGH (ref 70–99)
GLUCOSE BLDC GLUCOMTR-MCNC: 170 MG/DL — HIGH (ref 70–99)
GLUCOSE BLDC GLUCOMTR-MCNC: 172 MG/DL — HIGH (ref 70–99)
GLUCOSE BLDC GLUCOMTR-MCNC: 172 MG/DL — HIGH (ref 70–99)
GLUCOSE BLDC GLUCOMTR-MCNC: 173 MG/DL — HIGH (ref 70–99)
GLUCOSE BLDC GLUCOMTR-MCNC: 174 MG/DL — HIGH (ref 70–99)
GLUCOSE BLDC GLUCOMTR-MCNC: 174 MG/DL — HIGH (ref 70–99)
GLUCOSE BLDC GLUCOMTR-MCNC: 175 MG/DL — HIGH (ref 70–99)
GLUCOSE BLDC GLUCOMTR-MCNC: 176 MG/DL — HIGH (ref 70–99)
GLUCOSE BLDC GLUCOMTR-MCNC: 176 MG/DL — HIGH (ref 70–99)
GLUCOSE BLDC GLUCOMTR-MCNC: 177 MG/DL — HIGH (ref 70–99)
GLUCOSE BLDC GLUCOMTR-MCNC: 179 MG/DL — HIGH (ref 70–99)
GLUCOSE BLDC GLUCOMTR-MCNC: 180 MG/DL — HIGH (ref 70–99)
GLUCOSE BLDC GLUCOMTR-MCNC: 185 MG/DL — HIGH (ref 70–99)
GLUCOSE BLDC GLUCOMTR-MCNC: 187 MG/DL — HIGH (ref 70–99)
GLUCOSE BLDC GLUCOMTR-MCNC: 190 MG/DL — HIGH (ref 70–99)
GLUCOSE BLDC GLUCOMTR-MCNC: 190 MG/DL — HIGH (ref 70–99)
GLUCOSE BLDC GLUCOMTR-MCNC: 192 MG/DL — HIGH (ref 70–99)
GLUCOSE BLDC GLUCOMTR-MCNC: 193 MG/DL — HIGH (ref 70–99)
GLUCOSE BLDC GLUCOMTR-MCNC: 196 MG/DL — HIGH (ref 70–99)
GLUCOSE BLDC GLUCOMTR-MCNC: 197 MG/DL — HIGH (ref 70–99)
GLUCOSE BLDC GLUCOMTR-MCNC: 199 MG/DL — HIGH (ref 70–99)
GLUCOSE BLDC GLUCOMTR-MCNC: 201 MG/DL — HIGH (ref 70–99)
GLUCOSE BLDC GLUCOMTR-MCNC: 202 MG/DL — HIGH (ref 70–99)
GLUCOSE BLDC GLUCOMTR-MCNC: 202 MG/DL — HIGH (ref 70–99)
GLUCOSE BLDC GLUCOMTR-MCNC: 206 MG/DL — HIGH (ref 70–99)
GLUCOSE BLDC GLUCOMTR-MCNC: 210 MG/DL — HIGH (ref 70–99)
GLUCOSE BLDC GLUCOMTR-MCNC: 210 MG/DL — HIGH (ref 70–99)
GLUCOSE BLDC GLUCOMTR-MCNC: 220 MG/DL — HIGH (ref 70–99)
GLUCOSE BLDC GLUCOMTR-MCNC: 225 MG/DL — HIGH (ref 70–99)
GLUCOSE BLDC GLUCOMTR-MCNC: 226 MG/DL — HIGH (ref 70–99)
GLUCOSE BLDC GLUCOMTR-MCNC: 228 MG/DL — HIGH (ref 70–99)
GLUCOSE BLDC GLUCOMTR-MCNC: 228 MG/DL — HIGH (ref 70–99)
GLUCOSE BLDC GLUCOMTR-MCNC: 23 MG/DL — CRITICAL LOW (ref 70–99)
GLUCOSE BLDC GLUCOMTR-MCNC: 231 MG/DL — HIGH (ref 70–99)
GLUCOSE BLDC GLUCOMTR-MCNC: 232 MG/DL — HIGH (ref 70–99)
GLUCOSE BLDC GLUCOMTR-MCNC: 251 MG/DL — HIGH (ref 70–99)
GLUCOSE BLDC GLUCOMTR-MCNC: 263 MG/DL — HIGH (ref 70–99)
GLUCOSE BLDC GLUCOMTR-MCNC: 265 MG/DL — HIGH (ref 70–99)
GLUCOSE BLDC GLUCOMTR-MCNC: 58 MG/DL — LOW (ref 70–99)
GLUCOSE BLDC GLUCOMTR-MCNC: 73 MG/DL — SIGNIFICANT CHANGE UP (ref 70–99)
GLUCOSE BLDC GLUCOMTR-MCNC: 79 MG/DL — SIGNIFICANT CHANGE UP (ref 70–99)
GLUCOSE BLDC GLUCOMTR-MCNC: 79 MG/DL — SIGNIFICANT CHANGE UP (ref 70–99)
GLUCOSE BLDC GLUCOMTR-MCNC: 80 MG/DL — SIGNIFICANT CHANGE UP (ref 70–99)
GLUCOSE BLDC GLUCOMTR-MCNC: 81 MG/DL — SIGNIFICANT CHANGE UP (ref 70–99)
GLUCOSE BLDC GLUCOMTR-MCNC: 82 MG/DL — SIGNIFICANT CHANGE UP (ref 70–99)
GLUCOSE BLDC GLUCOMTR-MCNC: 87 MG/DL — SIGNIFICANT CHANGE UP (ref 70–99)
GLUCOSE BLDC GLUCOMTR-MCNC: 92 MG/DL — SIGNIFICANT CHANGE UP (ref 70–99)
GLUCOSE BLDC GLUCOMTR-MCNC: 97 MG/DL — SIGNIFICANT CHANGE UP (ref 70–99)
GLUCOSE SERPL-MCNC: 104 MG/DL — HIGH (ref 70–99)
GLUCOSE SERPL-MCNC: 107 MG/DL — HIGH (ref 70–99)
GLUCOSE SERPL-MCNC: 123 MG/DL — HIGH (ref 70–99)
GLUCOSE SERPL-MCNC: 124 MG/DL — HIGH (ref 70–99)
GLUCOSE SERPL-MCNC: 125 MG/DL — HIGH (ref 70–99)
GLUCOSE SERPL-MCNC: 133 MG/DL — HIGH (ref 70–99)
GLUCOSE SERPL-MCNC: 135 MG/DL — HIGH (ref 70–99)
GLUCOSE SERPL-MCNC: 140 MG/DL — HIGH (ref 70–99)
GLUCOSE SERPL-MCNC: 143 MG/DL — HIGH (ref 70–99)
GLUCOSE SERPL-MCNC: 148 MG/DL — HIGH (ref 70–99)
GLUCOSE SERPL-MCNC: 152 MG/DL — HIGH (ref 70–99)
GLUCOSE SERPL-MCNC: 154 MG/DL — HIGH (ref 70–99)
GLUCOSE SERPL-MCNC: 155 MG/DL — HIGH (ref 70–99)
GLUCOSE SERPL-MCNC: 156 MG/DL — HIGH (ref 70–99)
GLUCOSE SERPL-MCNC: 157 MG/DL — HIGH (ref 70–99)
GLUCOSE SERPL-MCNC: 161 MG/DL — HIGH (ref 70–99)
GLUCOSE SERPL-MCNC: 167 MG/DL — HIGH (ref 70–99)
GLUCOSE SERPL-MCNC: 172 MG/DL — HIGH (ref 70–99)
GLUCOSE SERPL-MCNC: 180 MG/DL — HIGH (ref 70–99)
GLUCOSE SERPL-MCNC: 222 MG/DL — HIGH (ref 70–99)
GLUCOSE SERPL-MCNC: 242 MG/DL — HIGH (ref 70–99)
GLUCOSE SERPL-MCNC: 264 MG/DL — HIGH (ref 70–99)
GLUCOSE SERPL-MCNC: 64 MG/DL — LOW (ref 70–99)
GLUCOSE SERPL-MCNC: 78 MG/DL — SIGNIFICANT CHANGE UP (ref 70–99)
GLUCOSE SERPL-MCNC: 81 MG/DL — SIGNIFICANT CHANGE UP (ref 70–99)
GLUCOSE SERPL-MCNC: 96 MG/DL — SIGNIFICANT CHANGE UP (ref 70–99)
GLUCOSE SERPL-MCNC: 97 MG/DL — SIGNIFICANT CHANGE UP (ref 70–99)
GLUCOSE UR QL: NEGATIVE — SIGNIFICANT CHANGE UP
HCO3 BLDA-SCNC: 15 MMOL/L — LOW (ref 23–27)
HCO3 BLDA-SCNC: 16 MMOL/L — LOW (ref 23–27)
HCO3 BLDA-SCNC: 16 MMOL/L — LOW (ref 23–27)
HCO3 BLDA-SCNC: 18 MMOL/L — LOW (ref 23–27)
HCO3 BLDA-SCNC: 18 MMOL/L — LOW (ref 23–27)
HCO3 BLDA-SCNC: 19 MMOL/L — LOW (ref 23–27)
HCO3 BLDA-SCNC: 19 MMOL/L — LOW (ref 23–27)
HCO3 BLDA-SCNC: 20 MMOL/L — LOW (ref 23–27)
HCO3 BLDA-SCNC: 21 MMOL/L — LOW (ref 23–27)
HCO3 BLDA-SCNC: 22 MMOL/L — LOW (ref 23–27)
HCO3 BLDA-SCNC: 22 MMOL/L — LOW (ref 23–27)
HCO3 BLDA-SCNC: 23 MMOL/L — SIGNIFICANT CHANGE UP (ref 23–27)
HCO3 BLDA-SCNC: 24 MMOL/L — SIGNIFICANT CHANGE UP (ref 23–27)
HCO3 BLDA-SCNC: 25 MMOL/L — SIGNIFICANT CHANGE UP (ref 23–27)
HCO3 BLDA-SCNC: 26 MMOL/L — SIGNIFICANT CHANGE UP (ref 23–27)
HCO3 BLDA-SCNC: 28 MMOL/L — HIGH (ref 23–27)
HCO3 BLDA-SCNC: 28 MMOL/L — HIGH (ref 23–27)
HCO3 BLDA-SCNC: 29 MMOL/L — HIGH (ref 23–27)
HCO3 BLDA-SCNC: 30 MMOL/L — HIGH (ref 23–27)
HCO3 BLDA-SCNC: 33 MMOL/L — HIGH (ref 23–27)
HCT VFR BLD CALC: 22.8 % — LOW (ref 37–47)
HCT VFR BLD CALC: 22.9 % — LOW (ref 37–47)
HCT VFR BLD CALC: 23.2 % — LOW (ref 37–47)
HCT VFR BLD CALC: 23.9 % — LOW (ref 37–47)
HCT VFR BLD CALC: 23.9 % — LOW (ref 37–47)
HCT VFR BLD CALC: 24.3 % — LOW (ref 37–47)
HCT VFR BLD CALC: 24.4 % — LOW (ref 37–47)
HCT VFR BLD CALC: 24.6 % — LOW (ref 37–47)
HCT VFR BLD CALC: 25 % — LOW (ref 37–47)
HCT VFR BLD CALC: 25 % — LOW (ref 37–47)
HCT VFR BLD CALC: 25.9 % — LOW (ref 37–47)
HCT VFR BLD CALC: 26.4 % — LOW (ref 37–47)
HCT VFR BLD CALC: 27 % — LOW (ref 37–47)
HCT VFR BLD CALC: 27.6 % — LOW (ref 37–47)
HCT VFR BLD CALC: 28.4 % — LOW (ref 37–47)
HCT VFR BLD CALC: 29.2 % — LOW (ref 37–47)
HCT VFR BLD CALC: 29.6 % — LOW (ref 37–47)
HCT VFR BLD CALC: 29.6 % — LOW (ref 37–47)
HCT VFR BLD CALC: 29.8 % — LOW (ref 37–47)
HCT VFR BLD CALC: 29.8 % — LOW (ref 37–47)
HCT VFR BLD CALC: 29.9 % — LOW (ref 37–47)
HCT VFR BLD CALC: 30.1 % — LOW (ref 37–47)
HCT VFR BLD CALC: 30.2 % — LOW (ref 37–47)
HCT VFR BLD CALC: 30.5 % — LOW (ref 37–47)
HCT VFR BLD CALC: 32 % — LOW (ref 37–47)
HCT VFR BLD CALC: 33.3 % — LOW (ref 37–47)
HCT VFR BLD CALC: 34.3 % — LOW (ref 37–47)
HCT VFR BLD CALC: 34.5 % — LOW (ref 37–47)
HCT VFR BLD CALC: 36.2 % — LOW (ref 37–47)
HCT VFR BLD CALC: 38.4 % — SIGNIFICANT CHANGE UP (ref 37–47)
HCV AB S/CO SERPL IA: 0.04 COI — SIGNIFICANT CHANGE UP
HCV AB SERPL-IMP: SIGNIFICANT CHANGE UP
HGB BLD-MCNC: 10.2 G/DL — LOW (ref 12–16)
HGB BLD-MCNC: 10.3 G/DL — LOW (ref 12–16)
HGB BLD-MCNC: 10.5 G/DL — LOW (ref 12–16)
HGB BLD-MCNC: 11 G/DL — LOW (ref 12–16)
HGB BLD-MCNC: 11.5 G/DL — LOW (ref 12–16)
HGB BLD-MCNC: 12.1 G/DL — SIGNIFICANT CHANGE UP (ref 12–16)
HGB BLD-MCNC: 13 G/DL — SIGNIFICANT CHANGE UP (ref 12–16)
HGB BLD-MCNC: 7.1 G/DL — LOW (ref 12–16)
HGB BLD-MCNC: 7.3 G/DL — LOW (ref 12–16)
HGB BLD-MCNC: 7.4 G/DL — LOW (ref 12–16)
HGB BLD-MCNC: 7.5 G/DL — LOW (ref 12–16)
HGB BLD-MCNC: 7.6 G/DL — LOW (ref 12–16)
HGB BLD-MCNC: 7.6 G/DL — LOW (ref 12–16)
HGB BLD-MCNC: 7.9 G/DL — LOW (ref 12–16)
HGB BLD-MCNC: 7.9 G/DL — LOW (ref 12–16)
HGB BLD-MCNC: 8.3 G/DL — LOW (ref 12–16)
HGB BLD-MCNC: 8.3 G/DL — LOW (ref 12–16)
HGB BLD-MCNC: 8.4 G/DL — LOW (ref 12–16)
HGB BLD-MCNC: 8.5 G/DL — LOW (ref 12–16)
HGB BLD-MCNC: 8.5 G/DL — LOW (ref 12–16)
HGB BLD-MCNC: 8.8 G/DL — LOW (ref 12–16)
HGB BLD-MCNC: 9 G/DL — LOW (ref 12–16)
HGB BLD-MCNC: 9.2 G/DL — LOW (ref 12–16)
HGB BLD-MCNC: 9.2 G/DL — LOW (ref 12–16)
HGB BLD-MCNC: 9.3 G/DL — LOW (ref 12–16)
HGB BLD-MCNC: 9.5 G/DL — LOW (ref 12–16)
HGB BLD-MCNC: 9.6 G/DL — LOW (ref 12–16)
HGB BLD-MCNC: 9.7 G/DL — LOW (ref 12–16)
HOROWITZ INDEX BLDA+IHG-RTO: 100 — SIGNIFICANT CHANGE UP
HOROWITZ INDEX BLDA+IHG-RTO: 45 — SIGNIFICANT CHANGE UP
HOROWITZ INDEX BLDA+IHG-RTO: 55 — SIGNIFICANT CHANGE UP
HOROWITZ INDEX BLDA+IHG-RTO: 60 — SIGNIFICANT CHANGE UP
HOROWITZ INDEX BLDA+IHG-RTO: 60 — SIGNIFICANT CHANGE UP
HOROWITZ INDEX BLDA+IHG-RTO: 70 — SIGNIFICANT CHANGE UP
HOROWITZ INDEX BLDA+IHG-RTO: 70 — SIGNIFICANT CHANGE UP
HYALINE CASTS # UR AUTO: 0 /LPF — SIGNIFICANT CHANGE UP (ref 0–7)
HYALINE CASTS # UR AUTO: 1 /LPF — SIGNIFICANT CHANGE UP (ref 0–7)
HYALINE CASTS # UR AUTO: 5 /LPF — SIGNIFICANT CHANGE UP (ref 0–7)
IMM GRANULOCYTES NFR BLD AUTO: 1.3 % — HIGH (ref 0.1–0.3)
IMM GRANULOCYTES NFR BLD AUTO: 1.4 % — HIGH (ref 0.1–0.3)
IMM GRANULOCYTES NFR BLD AUTO: 1.5 % — HIGH (ref 0.1–0.3)
IMM GRANULOCYTES NFR BLD AUTO: 1.6 % — HIGH (ref 0.1–0.3)
IMM GRANULOCYTES NFR BLD AUTO: 1.6 % — HIGH (ref 0.1–0.3)
IMM GRANULOCYTES NFR BLD AUTO: 1.7 % — HIGH (ref 0.1–0.3)
IMM GRANULOCYTES NFR BLD AUTO: 10.2 % — HIGH (ref 0.1–0.3)
IMM GRANULOCYTES NFR BLD AUTO: 2 % — HIGH (ref 0.1–0.3)
IMM GRANULOCYTES NFR BLD AUTO: 2.1 % — HIGH (ref 0.1–0.3)
IMM GRANULOCYTES NFR BLD AUTO: 2.7 % — HIGH (ref 0.1–0.3)
IMM GRANULOCYTES NFR BLD AUTO: 4.6 % — HIGH (ref 0.1–0.3)
IMM GRANULOCYTES NFR BLD AUTO: 6.1 % — HIGH (ref 0.1–0.3)
IMM GRANULOCYTES NFR BLD AUTO: 6.8 % — HIGH (ref 0.1–0.3)
IMM GRANULOCYTES NFR BLD AUTO: 8.2 % — HIGH (ref 0.1–0.3)
INR BLD: 1.01 RATIO — SIGNIFICANT CHANGE UP (ref 0.65–1.3)
INR BLD: 1.01 RATIO — SIGNIFICANT CHANGE UP (ref 0.65–1.3)
INR BLD: 1.08 RATIO — SIGNIFICANT CHANGE UP (ref 0.65–1.3)
INR BLD: 1.08 RATIO — SIGNIFICANT CHANGE UP (ref 0.65–1.3)
INR BLD: 1.09 RATIO — SIGNIFICANT CHANGE UP (ref 0.65–1.3)
INR BLD: 1.09 RATIO — SIGNIFICANT CHANGE UP (ref 0.65–1.3)
INR BLD: 1.12 RATIO — SIGNIFICANT CHANGE UP (ref 0.65–1.3)
INR BLD: 1.15 RATIO — SIGNIFICANT CHANGE UP (ref 0.65–1.3)
INR BLD: 1.17 RATIO — SIGNIFICANT CHANGE UP (ref 0.65–1.3)
INR BLD: 1.23 RATIO — SIGNIFICANT CHANGE UP (ref 0.65–1.3)
KETONES UR-MCNC: ABNORMAL
KETONES UR-MCNC: NEGATIVE — SIGNIFICANT CHANGE UP
LACTATE SERPL-SCNC: 1.1 MMOL/L — SIGNIFICANT CHANGE UP (ref 0.7–2)
LACTATE SERPL-SCNC: 1.3 MMOL/L — SIGNIFICANT CHANGE UP (ref 0.7–2)
LACTATE SERPL-SCNC: 2.1 MMOL/L — HIGH (ref 0.7–2)
LDH SERPL L TO P-CCNC: 336 — HIGH (ref 50–242)
LDH SERPL L TO P-CCNC: 432 — HIGH (ref 50–242)
LDH SERPL L TO P-CCNC: 510 — HIGH (ref 50–242)
LEUKOCYTE ESTERASE UR-ACNC: ABNORMAL
LEUKOCYTE ESTERASE UR-ACNC: ABNORMAL
LEUKOCYTE ESTERASE UR-ACNC: NEGATIVE — SIGNIFICANT CHANGE UP
LYMPHOCYTES # BLD AUTO: 0.12 K/UL — LOW (ref 1.2–3.4)
LYMPHOCYTES # BLD AUTO: 0.16 K/UL — LOW (ref 1.2–3.4)
LYMPHOCYTES # BLD AUTO: 0.24 K/UL — LOW (ref 1.2–3.4)
LYMPHOCYTES # BLD AUTO: 0.28 K/UL — LOW (ref 1.2–3.4)
LYMPHOCYTES # BLD AUTO: 0.28 K/UL — LOW (ref 1.2–3.4)
LYMPHOCYTES # BLD AUTO: 0.3 K/UL — LOW (ref 1.2–3.4)
LYMPHOCYTES # BLD AUTO: 0.32 K/UL — LOW (ref 1.2–3.4)
LYMPHOCYTES # BLD AUTO: 0.33 K/UL — LOW (ref 1.2–3.4)
LYMPHOCYTES # BLD AUTO: 0.35 K/UL — LOW (ref 1.2–3.4)
LYMPHOCYTES # BLD AUTO: 0.39 K/UL — LOW (ref 1.2–3.4)
LYMPHOCYTES # BLD AUTO: 0.5 K/UL — LOW (ref 1.2–3.4)
LYMPHOCYTES # BLD AUTO: 0.52 K/UL — LOW (ref 1.2–3.4)
LYMPHOCYTES # BLD AUTO: 0.58 K/UL — LOW (ref 1.2–3.4)
LYMPHOCYTES # BLD AUTO: 0.67 K/UL — LOW (ref 1.2–3.4)
LYMPHOCYTES # BLD AUTO: 0.88 K/UL — LOW (ref 1.2–3.4)
LYMPHOCYTES # BLD AUTO: 1.17 K/UL — LOW (ref 1.2–3.4)
LYMPHOCYTES # BLD AUTO: 1.45 K/UL — SIGNIFICANT CHANGE UP (ref 1.2–3.4)
LYMPHOCYTES # BLD AUTO: 1.48 K/UL — SIGNIFICANT CHANGE UP (ref 1.2–3.4)
LYMPHOCYTES # BLD AUTO: 1.5 % — LOW (ref 20.5–51.1)
LYMPHOCYTES # BLD AUTO: 1.74 K/UL — SIGNIFICANT CHANGE UP (ref 1.2–3.4)
LYMPHOCYTES # BLD AUTO: 1.9 % — LOW (ref 20.5–51.1)
LYMPHOCYTES # BLD AUTO: 1.98 K/UL — SIGNIFICANT CHANGE UP (ref 1.2–3.4)
LYMPHOCYTES # BLD AUTO: 2.5 % — LOW (ref 20.5–51.1)
LYMPHOCYTES # BLD AUTO: 2.6 % — LOW (ref 20.5–51.1)
LYMPHOCYTES # BLD AUTO: 2.6 % — LOW (ref 20.5–51.1)
LYMPHOCYTES # BLD AUTO: 3.1 % — LOW (ref 20.5–51.1)
LYMPHOCYTES # BLD AUTO: 3.3 % — LOW (ref 20.5–51.1)
LYMPHOCYTES # BLD AUTO: 3.4 % — LOW (ref 20.5–51.1)
LYMPHOCYTES # BLD AUTO: 4 % — LOW (ref 20.5–51.1)
LYMPHOCYTES # BLD AUTO: 4.3 % — LOW (ref 20.5–51.1)
LYMPHOCYTES # BLD AUTO: 4.6 % — LOW (ref 20.5–51.1)
LYMPHOCYTES # BLD AUTO: 4.9 % — LOW (ref 20.5–51.1)
LYMPHOCYTES # BLD AUTO: 5.2 % — LOW (ref 20.5–51.1)
LYMPHOCYTES # BLD AUTO: 5.3 % — LOW (ref 20.5–51.1)
LYMPHOCYTES # BLD AUTO: 5.7 % — LOW (ref 20.5–51.1)
LYMPHOCYTES # BLD AUTO: 6.9 % — LOW (ref 20.5–51.1)
LYMPHOCYTES # BLD AUTO: 7.1 % — LOW (ref 20.5–51.1)
LYMPHOCYTES # BLD AUTO: 7.7 % — LOW (ref 20.5–51.1)
LYMPHOCYTES # BLD AUTO: 9 % — LOW (ref 20.5–51.1)
LYMPHOCYTES # BLD AUTO: 9.2 % — LOW (ref 20.5–51.1)
MACROCYTES BLD QL: SLIGHT — SIGNIFICANT CHANGE UP
MAGNESIUM SERPL-MCNC: 1.6 MG/DL — LOW (ref 1.8–2.4)
MAGNESIUM SERPL-MCNC: 1.8 MG/DL — SIGNIFICANT CHANGE UP (ref 1.8–2.4)
MAGNESIUM SERPL-MCNC: 1.9 MG/DL — SIGNIFICANT CHANGE UP (ref 1.8–2.4)
MAGNESIUM SERPL-MCNC: 1.9 MG/DL — SIGNIFICANT CHANGE UP (ref 1.8–2.4)
MAGNESIUM SERPL-MCNC: 2.1 MG/DL — SIGNIFICANT CHANGE UP (ref 1.8–2.4)
MAGNESIUM SERPL-MCNC: 2.4 MG/DL — SIGNIFICANT CHANGE UP (ref 1.8–2.4)
MAGNESIUM SERPL-MCNC: 2.4 MG/DL — SIGNIFICANT CHANGE UP (ref 1.8–2.4)
MAGNESIUM SERPL-MCNC: 2.6 MG/DL — HIGH (ref 1.8–2.4)
MAGNESIUM SERPL-MCNC: 3 MG/DL — HIGH (ref 1.8–2.4)
MAGNESIUM SERPL-MCNC: 3 MG/DL — HIGH (ref 1.8–2.4)
MANUAL SMEAR VERIFICATION: SIGNIFICANT CHANGE UP
MCHC RBC-ENTMCNC: 26.7 PG — LOW (ref 27–31)
MCHC RBC-ENTMCNC: 26.8 PG — LOW (ref 27–31)
MCHC RBC-ENTMCNC: 26.9 PG — LOW (ref 27–31)
MCHC RBC-ENTMCNC: 26.9 PG — LOW (ref 27–31)
MCHC RBC-ENTMCNC: 27 PG — SIGNIFICANT CHANGE UP (ref 27–31)
MCHC RBC-ENTMCNC: 27.1 PG — SIGNIFICANT CHANGE UP (ref 27–31)
MCHC RBC-ENTMCNC: 27.1 PG — SIGNIFICANT CHANGE UP (ref 27–31)
MCHC RBC-ENTMCNC: 27.2 PG — SIGNIFICANT CHANGE UP (ref 27–31)
MCHC RBC-ENTMCNC: 27.4 PG — SIGNIFICANT CHANGE UP (ref 27–31)
MCHC RBC-ENTMCNC: 27.4 PG — SIGNIFICANT CHANGE UP (ref 27–31)
MCHC RBC-ENTMCNC: 27.5 PG — SIGNIFICANT CHANGE UP (ref 27–31)
MCHC RBC-ENTMCNC: 27.5 PG — SIGNIFICANT CHANGE UP (ref 27–31)
MCHC RBC-ENTMCNC: 27.6 PG — SIGNIFICANT CHANGE UP (ref 27–31)
MCHC RBC-ENTMCNC: 28 PG — SIGNIFICANT CHANGE UP (ref 27–31)
MCHC RBC-ENTMCNC: 28 PG — SIGNIFICANT CHANGE UP (ref 27–31)
MCHC RBC-ENTMCNC: 28.1 PG — SIGNIFICANT CHANGE UP (ref 27–31)
MCHC RBC-ENTMCNC: 28.1 PG — SIGNIFICANT CHANGE UP (ref 27–31)
MCHC RBC-ENTMCNC: 28.2 PG — SIGNIFICANT CHANGE UP (ref 27–31)
MCHC RBC-ENTMCNC: 28.4 PG — SIGNIFICANT CHANGE UP (ref 27–31)
MCHC RBC-ENTMCNC: 28.6 PG — SIGNIFICANT CHANGE UP (ref 27–31)
MCHC RBC-ENTMCNC: 28.8 PG — SIGNIFICANT CHANGE UP (ref 27–31)
MCHC RBC-ENTMCNC: 28.9 PG — SIGNIFICANT CHANGE UP (ref 27–31)
MCHC RBC-ENTMCNC: 28.9 PG — SIGNIFICANT CHANGE UP (ref 27–31)
MCHC RBC-ENTMCNC: 29.1 PG — SIGNIFICANT CHANGE UP (ref 27–31)
MCHC RBC-ENTMCNC: 29.1 PG — SIGNIFICANT CHANGE UP (ref 27–31)
MCHC RBC-ENTMCNC: 29.3 G/DL — LOW (ref 32–37)
MCHC RBC-ENTMCNC: 29.3 PG — SIGNIFICANT CHANGE UP (ref 27–31)
MCHC RBC-ENTMCNC: 29.4 PG — SIGNIFICANT CHANGE UP (ref 27–31)
MCHC RBC-ENTMCNC: 29.6 PG — SIGNIFICANT CHANGE UP (ref 27–31)
MCHC RBC-ENTMCNC: 29.7 G/DL — LOW (ref 32–37)
MCHC RBC-ENTMCNC: 29.9 G/DL — LOW (ref 32–37)
MCHC RBC-ENTMCNC: 30 G/DL — LOW (ref 32–37)
MCHC RBC-ENTMCNC: 30 G/DL — LOW (ref 32–37)
MCHC RBC-ENTMCNC: 30.1 G/DL — LOW (ref 32–37)
MCHC RBC-ENTMCNC: 30.2 G/DL — LOW (ref 32–37)
MCHC RBC-ENTMCNC: 30.2 G/DL — LOW (ref 32–37)
MCHC RBC-ENTMCNC: 30.3 G/DL — LOW (ref 32–37)
MCHC RBC-ENTMCNC: 30.5 G/DL — LOW (ref 32–37)
MCHC RBC-ENTMCNC: 30.9 G/DL — LOW (ref 32–37)
MCHC RBC-ENTMCNC: 31.1 G/DL — LOW (ref 32–37)
MCHC RBC-ENTMCNC: 31.5 G/DL — LOW (ref 32–37)
MCHC RBC-ENTMCNC: 31.6 G/DL — LOW (ref 32–37)
MCHC RBC-ENTMCNC: 31.8 G/DL — LOW (ref 32–37)
MCHC RBC-ENTMCNC: 31.9 G/DL — LOW (ref 32–37)
MCHC RBC-ENTMCNC: 31.9 G/DL — LOW (ref 32–37)
MCHC RBC-ENTMCNC: 32.1 G/DL — SIGNIFICANT CHANGE UP (ref 32–37)
MCHC RBC-ENTMCNC: 32.8 G/DL — SIGNIFICANT CHANGE UP (ref 32–37)
MCHC RBC-ENTMCNC: 33.1 G/DL — SIGNIFICANT CHANGE UP (ref 32–37)
MCHC RBC-ENTMCNC: 33.2 G/DL — SIGNIFICANT CHANGE UP (ref 32–37)
MCHC RBC-ENTMCNC: 33.4 G/DL — SIGNIFICANT CHANGE UP (ref 32–37)
MCHC RBC-ENTMCNC: 33.5 G/DL — SIGNIFICANT CHANGE UP (ref 32–37)
MCHC RBC-ENTMCNC: 33.9 G/DL — SIGNIFICANT CHANGE UP (ref 32–37)
MCHC RBC-ENTMCNC: 34.1 G/DL — SIGNIFICANT CHANGE UP (ref 32–37)
MCV RBC AUTO: 79.6 FL — LOW (ref 81–99)
MCV RBC AUTO: 82.7 FL — SIGNIFICANT CHANGE UP (ref 81–99)
MCV RBC AUTO: 83.9 FL — SIGNIFICANT CHANGE UP (ref 81–99)
MCV RBC AUTO: 84.2 FL — SIGNIFICANT CHANGE UP (ref 81–99)
MCV RBC AUTO: 84.4 FL — SIGNIFICANT CHANGE UP (ref 81–99)
MCV RBC AUTO: 84.4 FL — SIGNIFICANT CHANGE UP (ref 81–99)
MCV RBC AUTO: 84.8 FL — SIGNIFICANT CHANGE UP (ref 81–99)
MCV RBC AUTO: 85.2 FL — SIGNIFICANT CHANGE UP (ref 81–99)
MCV RBC AUTO: 87.6 FL — SIGNIFICANT CHANGE UP (ref 81–99)
MCV RBC AUTO: 87.6 FL — SIGNIFICANT CHANGE UP (ref 81–99)
MCV RBC AUTO: 87.7 FL — SIGNIFICANT CHANGE UP (ref 81–99)
MCV RBC AUTO: 88.1 FL — SIGNIFICANT CHANGE UP (ref 81–99)
MCV RBC AUTO: 88.2 FL — SIGNIFICANT CHANGE UP (ref 81–99)
MCV RBC AUTO: 88.8 FL — SIGNIFICANT CHANGE UP (ref 81–99)
MCV RBC AUTO: 89.2 FL — SIGNIFICANT CHANGE UP (ref 81–99)
MCV RBC AUTO: 89.5 FL — SIGNIFICANT CHANGE UP (ref 81–99)
MCV RBC AUTO: 89.8 FL — SIGNIFICANT CHANGE UP (ref 81–99)
MCV RBC AUTO: 90.3 FL — SIGNIFICANT CHANGE UP (ref 81–99)
MCV RBC AUTO: 90.5 FL — SIGNIFICANT CHANGE UP (ref 81–99)
MCV RBC AUTO: 90.5 FL — SIGNIFICANT CHANGE UP (ref 81–99)
MCV RBC AUTO: 90.7 FL — SIGNIFICANT CHANGE UP (ref 81–99)
MCV RBC AUTO: 90.8 FL — SIGNIFICANT CHANGE UP (ref 81–99)
MCV RBC AUTO: 91 FL — SIGNIFICANT CHANGE UP (ref 81–99)
MCV RBC AUTO: 91.3 FL — SIGNIFICANT CHANGE UP (ref 81–99)
MCV RBC AUTO: 91.4 FL — SIGNIFICANT CHANGE UP (ref 81–99)
MCV RBC AUTO: 91.4 FL — SIGNIFICANT CHANGE UP (ref 81–99)
MCV RBC AUTO: 91.8 FL — SIGNIFICANT CHANGE UP (ref 81–99)
MCV RBC AUTO: 91.9 FL — SIGNIFICANT CHANGE UP (ref 81–99)
MCV RBC AUTO: 92.8 FL — SIGNIFICANT CHANGE UP (ref 81–99)
MCV RBC AUTO: 94.7 FL — SIGNIFICANT CHANGE UP (ref 81–99)
METAMYELOCYTES # FLD: 0.9 % — HIGH (ref 0–0)
METAMYELOCYTES # FLD: 1.8 % — HIGH (ref 0–0)
METAMYELOCYTES # FLD: 2.6 % — HIGH (ref 0–0)
MICROCYTES BLD QL: SIGNIFICANT CHANGE UP
MICROCYTES BLD QL: SIGNIFICANT CHANGE UP
MICROCYTES BLD QL: SLIGHT — SIGNIFICANT CHANGE UP
MICROCYTES BLD QL: SLIGHT — SIGNIFICANT CHANGE UP
MONOCYTES # BLD AUTO: 0.67 K/UL — HIGH (ref 0.1–0.6)
MONOCYTES # BLD AUTO: 1.19 K/UL — HIGH (ref 0.1–0.6)
MONOCYTES # BLD AUTO: 1.36 K/UL — HIGH (ref 0.1–0.6)
MONOCYTES # BLD AUTO: 1.44 K/UL — HIGH (ref 0.1–0.6)
MONOCYTES # BLD AUTO: 1.47 K/UL — HIGH (ref 0.1–0.6)
MONOCYTES # BLD AUTO: 1.47 K/UL — HIGH (ref 0.1–0.6)
MONOCYTES # BLD AUTO: 1.66 K/UL — HIGH (ref 0.1–0.6)
MONOCYTES # BLD AUTO: 1.69 K/UL — HIGH (ref 0.1–0.6)
MONOCYTES # BLD AUTO: 1.75 K/UL — HIGH (ref 0.1–0.6)
MONOCYTES # BLD AUTO: 1.92 K/UL — HIGH (ref 0.1–0.6)
MONOCYTES # BLD AUTO: 2.14 K/UL — HIGH (ref 0.1–0.6)
MONOCYTES # BLD AUTO: 2.42 K/UL — HIGH (ref 0.1–0.6)
MONOCYTES # BLD AUTO: 2.47 K/UL — HIGH (ref 0.1–0.6)
MONOCYTES # BLD AUTO: 3.24 K/UL — HIGH (ref 0.1–0.6)
MONOCYTES # BLD AUTO: 3.28 K/UL — HIGH (ref 0.1–0.6)
MONOCYTES # BLD AUTO: 4.33 K/UL — HIGH (ref 0.1–0.6)
MONOCYTES # BLD AUTO: 5.21 K/UL — HIGH (ref 0.1–0.6)
MONOCYTES # BLD AUTO: 5.46 K/UL — HIGH (ref 0.1–0.6)
MONOCYTES # BLD AUTO: 5.87 K/UL — HIGH (ref 0.1–0.6)
MONOCYTES # BLD AUTO: 7.1 K/UL — HIGH (ref 0.1–0.6)
MONOCYTES NFR BLD AUTO: 10.6 % — HIGH (ref 1.7–9.3)
MONOCYTES NFR BLD AUTO: 11.1 % — HIGH (ref 1.7–9.3)
MONOCYTES NFR BLD AUTO: 11.4 % — HIGH (ref 1.7–9.3)
MONOCYTES NFR BLD AUTO: 13.3 % — HIGH (ref 1.7–9.3)
MONOCYTES NFR BLD AUTO: 14.8 % — HIGH (ref 1.7–9.3)
MONOCYTES NFR BLD AUTO: 17.4 % — HIGH (ref 1.7–9.3)
MONOCYTES NFR BLD AUTO: 19.3 % — HIGH (ref 1.7–9.3)
MONOCYTES NFR BLD AUTO: 20.2 % — HIGH (ref 1.7–9.3)
MONOCYTES NFR BLD AUTO: 21.6 % — HIGH (ref 1.7–9.3)
MONOCYTES NFR BLD AUTO: 23.3 % — HIGH (ref 1.7–9.3)
MONOCYTES NFR BLD AUTO: 23.5 % — HIGH (ref 1.7–9.3)
MONOCYTES NFR BLD AUTO: 25.2 % — HIGH (ref 1.7–9.3)
MONOCYTES NFR BLD AUTO: 25.3 % — HIGH (ref 1.7–9.3)
MONOCYTES NFR BLD AUTO: 25.6 % — HIGH (ref 1.7–9.3)
MONOCYTES NFR BLD AUTO: 26.2 % — HIGH (ref 1.7–9.3)
MONOCYTES NFR BLD AUTO: 26.8 % — HIGH (ref 1.7–9.3)
MONOCYTES NFR BLD AUTO: 26.8 % — HIGH (ref 1.7–9.3)
MONOCYTES NFR BLD AUTO: 28.1 % — HIGH (ref 1.7–9.3)
MONOCYTES NFR BLD AUTO: 33.1 % — HIGH (ref 1.7–9.3)
MONOCYTES NFR BLD AUTO: 9.6 % — HIGH (ref 1.7–9.3)
MYELOCYTES NFR BLD: 0.9 % — HIGH (ref 0–0)
MYELOCYTES NFR BLD: 0.9 % — HIGH (ref 0–0)
NEUTROPHILS # BLD AUTO: 10.09 K/UL — HIGH (ref 1.4–6.5)
NEUTROPHILS # BLD AUTO: 10.78 K/UL — HIGH (ref 1.4–6.5)
NEUTROPHILS # BLD AUTO: 10.99 K/UL — HIGH (ref 1.4–6.5)
NEUTROPHILS # BLD AUTO: 13.21 K/UL — HIGH (ref 1.4–6.5)
NEUTROPHILS # BLD AUTO: 13.58 K/UL — HIGH (ref 1.4–6.5)
NEUTROPHILS # BLD AUTO: 16.71 K/UL — HIGH (ref 1.4–6.5)
NEUTROPHILS # BLD AUTO: 28.16 K/UL — HIGH (ref 1.4–6.5)
NEUTROPHILS # BLD AUTO: 3.6 K/UL — SIGNIFICANT CHANGE UP (ref 1.4–6.5)
NEUTROPHILS # BLD AUTO: 3.81 K/UL — SIGNIFICANT CHANGE UP (ref 1.4–6.5)
NEUTROPHILS # BLD AUTO: 4.7 K/UL — SIGNIFICANT CHANGE UP (ref 1.4–6.5)
NEUTROPHILS # BLD AUTO: 4.92 K/UL — SIGNIFICANT CHANGE UP (ref 1.4–6.5)
NEUTROPHILS # BLD AUTO: 5.16 K/UL — SIGNIFICANT CHANGE UP (ref 1.4–6.5)
NEUTROPHILS # BLD AUTO: 5.97 K/UL — SIGNIFICANT CHANGE UP (ref 1.4–6.5)
NEUTROPHILS # BLD AUTO: 6.86 K/UL — HIGH (ref 1.4–6.5)
NEUTROPHILS # BLD AUTO: 7.07 K/UL — HIGH (ref 1.4–6.5)
NEUTROPHILS # BLD AUTO: 7.51 K/UL — HIGH (ref 1.4–6.5)
NEUTROPHILS # BLD AUTO: 9.06 K/UL — HIGH (ref 1.4–6.5)
NEUTROPHILS # BLD AUTO: 9.23 K/UL — HIGH (ref 1.4–6.5)
NEUTROPHILS # BLD AUTO: 9.92 K/UL — HIGH (ref 1.4–6.5)
NEUTROPHILS # BLD AUTO: 9.95 K/UL — HIGH (ref 1.4–6.5)
NEUTROPHILS NFR BLD AUTO: 47.1 % — SIGNIFICANT CHANGE UP (ref 42.2–75.2)
NEUTROPHILS NFR BLD AUTO: 55.4 % — SIGNIFICANT CHANGE UP (ref 42.2–75.2)
NEUTROPHILS NFR BLD AUTO: 61.1 % — SIGNIFICANT CHANGE UP (ref 42.2–75.2)
NEUTROPHILS NFR BLD AUTO: 62.1 % — SIGNIFICANT CHANGE UP (ref 42.2–75.2)
NEUTROPHILS NFR BLD AUTO: 64.1 % — SIGNIFICANT CHANGE UP (ref 42.2–75.2)
NEUTROPHILS NFR BLD AUTO: 64.6 % — SIGNIFICANT CHANGE UP (ref 42.2–75.2)
NEUTROPHILS NFR BLD AUTO: 65.5 % — SIGNIFICANT CHANGE UP (ref 42.2–75.2)
NEUTROPHILS NFR BLD AUTO: 66.3 % — SIGNIFICANT CHANGE UP (ref 42.2–75.2)
NEUTROPHILS NFR BLD AUTO: 68.7 % — SIGNIFICANT CHANGE UP (ref 42.2–75.2)
NEUTROPHILS NFR BLD AUTO: 68.7 % — SIGNIFICANT CHANGE UP (ref 42.2–75.2)
NEUTROPHILS NFR BLD AUTO: 70 % — SIGNIFICANT CHANGE UP (ref 42.2–75.2)
NEUTROPHILS NFR BLD AUTO: 72.7 % — SIGNIFICANT CHANGE UP (ref 42.2–75.2)
NEUTROPHILS NFR BLD AUTO: 72.8 % — SIGNIFICANT CHANGE UP (ref 42.2–75.2)
NEUTROPHILS NFR BLD AUTO: 73.9 % — SIGNIFICANT CHANGE UP (ref 42.2–75.2)
NEUTROPHILS NFR BLD AUTO: 77.6 % — HIGH (ref 42.2–75.2)
NEUTROPHILS NFR BLD AUTO: 78.9 % — HIGH (ref 42.2–75.2)
NEUTROPHILS NFR BLD AUTO: 79.1 % — HIGH (ref 42.2–75.2)
NEUTROPHILS NFR BLD AUTO: 82.1 % — HIGH (ref 42.2–75.2)
NEUTROPHILS NFR BLD AUTO: 85.1 % — HIGH (ref 42.2–75.2)
NEUTROPHILS NFR BLD AUTO: 86 % — HIGH (ref 42.2–75.2)
NEUTS BAND # BLD: 1.7 % — SIGNIFICANT CHANGE UP (ref 0–6)
NEUTS BAND # BLD: 9.6 % — HIGH (ref 0–6)
NITRITE UR-MCNC: NEGATIVE — SIGNIFICANT CHANGE UP
NITRITE UR-MCNC: POSITIVE
NRBC # BLD: 0 /100 WBCS — SIGNIFICANT CHANGE UP (ref 0–0)
PCO2 BLDA: 24 MMHG — LOW (ref 38–42)
PCO2 BLDA: 28 MMHG — LOW (ref 38–42)
PCO2 BLDA: 31 MMHG — LOW (ref 38–42)
PCO2 BLDA: 33 MMHG — LOW (ref 38–42)
PCO2 BLDA: 34 MMHG — LOW (ref 38–42)
PCO2 BLDA: 37 MMHG — LOW (ref 38–42)
PCO2 BLDA: 38 MMHG — SIGNIFICANT CHANGE UP (ref 38–42)
PCO2 BLDA: 38 MMHG — SIGNIFICANT CHANGE UP (ref 38–42)
PCO2 BLDA: 39 MMHG — SIGNIFICANT CHANGE UP (ref 38–42)
PCO2 BLDA: 39 MMHG — SIGNIFICANT CHANGE UP (ref 38–42)
PCO2 BLDA: 45 MMHG — HIGH (ref 38–42)
PCO2 BLDA: 46 MMHG — HIGH (ref 38–42)
PCO2 BLDA: 49 MMHG — HIGH (ref 38–42)
PCO2 BLDA: 50 MMHG — HIGH (ref 38–42)
PCO2 BLDA: 51 MMHG — HIGH (ref 38–42)
PCO2 BLDA: 52 MMHG — HIGH (ref 38–42)
PCO2 BLDA: 52 MMHG — HIGH (ref 38–42)
PCO2 BLDA: 53 MMHG — HIGH (ref 38–42)
PCO2 BLDA: 59 MMHG — HIGH (ref 38–42)
PCO2 BLDA: 61 MMHG — CRITICAL HIGH (ref 38–42)
PCO2 BLDA: 64 MMHG — CRITICAL HIGH (ref 38–42)
PCO2 BLDA: 68 MMHG — CRITICAL HIGH (ref 38–42)
PH BLDA: 7.11 — CRITICAL LOW (ref 7.38–7.42)
PH BLDA: 7.15 — CRITICAL LOW (ref 7.38–7.42)
PH BLDA: 7.16 — CRITICAL LOW (ref 7.38–7.42)
PH BLDA: 7.2 — LOW (ref 7.38–7.42)
PH BLDA: 7.23 — LOW (ref 7.38–7.42)
PH BLDA: 7.23 — LOW (ref 7.38–7.42)
PH BLDA: 7.24 — LOW (ref 7.38–7.42)
PH BLDA: 7.25 — LOW (ref 7.38–7.42)
PH BLDA: 7.26 — LOW (ref 7.38–7.42)
PH BLDA: 7.31 — LOW (ref 7.38–7.42)
PH BLDA: 7.32 — LOW (ref 7.38–7.42)
PH BLDA: 7.32 — LOW (ref 7.38–7.42)
PH BLDA: 7.35 — LOW (ref 7.38–7.42)
PH BLDA: 7.36 — LOW (ref 7.38–7.42)
PH BLDA: 7.36 — LOW (ref 7.38–7.42)
PH BLDA: 7.4 — SIGNIFICANT CHANGE UP (ref 7.38–7.42)
PH BLDA: 7.41 — SIGNIFICANT CHANGE UP (ref 7.38–7.42)
PH BLDA: 7.42 — SIGNIFICANT CHANGE UP (ref 7.38–7.42)
PH BLDA: 7.44 — HIGH (ref 7.38–7.42)
PH BLDA: 7.48 — HIGH (ref 7.38–7.42)
PH BLDA: 7.49 — HIGH (ref 7.38–7.42)
PH BLDA: 7.52 — HIGH (ref 7.38–7.42)
PH UR: 5.5 — SIGNIFICANT CHANGE UP (ref 5–8)
PH UR: 6 — SIGNIFICANT CHANGE UP (ref 5–8)
PHOSPHATE SERPL-MCNC: 2.8 MG/DL — SIGNIFICANT CHANGE UP (ref 2.1–4.9)
PLAT MORPH BLD: NORMAL — SIGNIFICANT CHANGE UP
PLATELET # BLD AUTO: 121 K/UL — LOW (ref 130–400)
PLATELET # BLD AUTO: 140 K/UL — SIGNIFICANT CHANGE UP (ref 130–400)
PLATELET # BLD AUTO: 146 K/UL — SIGNIFICANT CHANGE UP (ref 130–400)
PLATELET # BLD AUTO: 152 K/UL — SIGNIFICANT CHANGE UP (ref 130–400)
PLATELET # BLD AUTO: 153 K/UL — SIGNIFICANT CHANGE UP (ref 130–400)
PLATELET # BLD AUTO: 154 K/UL — SIGNIFICANT CHANGE UP (ref 130–400)
PLATELET # BLD AUTO: 173 K/UL — SIGNIFICANT CHANGE UP (ref 130–400)
PLATELET # BLD AUTO: 177 K/UL — SIGNIFICANT CHANGE UP (ref 130–400)
PLATELET # BLD AUTO: 184 K/UL — SIGNIFICANT CHANGE UP (ref 130–400)
PLATELET # BLD AUTO: 189 K/UL — SIGNIFICANT CHANGE UP (ref 130–400)
PLATELET # BLD AUTO: 190 K/UL — SIGNIFICANT CHANGE UP (ref 130–400)
PLATELET # BLD AUTO: 199 K/UL — SIGNIFICANT CHANGE UP (ref 130–400)
PLATELET # BLD AUTO: 203 K/UL — SIGNIFICANT CHANGE UP (ref 130–400)
PLATELET # BLD AUTO: 204 K/UL — SIGNIFICANT CHANGE UP (ref 130–400)
PLATELET # BLD AUTO: 204 K/UL — SIGNIFICANT CHANGE UP (ref 130–400)
PLATELET # BLD AUTO: 206 K/UL — SIGNIFICANT CHANGE UP (ref 130–400)
PLATELET # BLD AUTO: 206 K/UL — SIGNIFICANT CHANGE UP (ref 130–400)
PLATELET # BLD AUTO: 217 K/UL — SIGNIFICANT CHANGE UP (ref 130–400)
PLATELET # BLD AUTO: 231 K/UL — SIGNIFICANT CHANGE UP (ref 130–400)
PLATELET # BLD AUTO: 240 K/UL — SIGNIFICANT CHANGE UP (ref 130–400)
PLATELET # BLD AUTO: 247 K/UL — SIGNIFICANT CHANGE UP (ref 130–400)
PLATELET # BLD AUTO: 260 K/UL — SIGNIFICANT CHANGE UP (ref 130–400)
PLATELET # BLD AUTO: 260 K/UL — SIGNIFICANT CHANGE UP (ref 130–400)
PLATELET # BLD AUTO: 267 K/UL — SIGNIFICANT CHANGE UP (ref 130–400)
PLATELET # BLD AUTO: 288 K/UL — SIGNIFICANT CHANGE UP (ref 130–400)
PLATELET # BLD AUTO: 294 K/UL — SIGNIFICANT CHANGE UP (ref 130–400)
PLATELET # BLD AUTO: 327 K/UL — SIGNIFICANT CHANGE UP (ref 130–400)
PLATELET # BLD AUTO: 333 K/UL — SIGNIFICANT CHANGE UP (ref 130–400)
PO2 BLDA: 101 MMHG — HIGH (ref 78–95)
PO2 BLDA: 105 MMHG — HIGH (ref 78–95)
PO2 BLDA: 105 MMHG — HIGH (ref 78–95)
PO2 BLDA: 116 MMHG — HIGH (ref 78–95)
PO2 BLDA: 116 MMHG — HIGH (ref 78–95)
PO2 BLDA: 118 MMHG — HIGH (ref 78–95)
PO2 BLDA: 143 MMHG — HIGH (ref 78–95)
PO2 BLDA: 171 MMHG — HIGH (ref 78–95)
PO2 BLDA: 181 MMHG — HIGH (ref 78–95)
PO2 BLDA: 199 MMHG — HIGH (ref 78–95)
PO2 BLDA: 237 MMHG — HIGH (ref 78–95)
PO2 BLDA: 54 MMHG — LOW (ref 78–95)
PO2 BLDA: 63 MMHG — LOW (ref 78–95)
PO2 BLDA: 64 MMHG — LOW (ref 78–95)
PO2 BLDA: 65 MMHG — LOW (ref 78–95)
PO2 BLDA: 67 MMHG — LOW (ref 78–95)
PO2 BLDA: 74 MMHG — LOW (ref 78–95)
PO2 BLDA: 74 MMHG — LOW (ref 78–95)
PO2 BLDA: 75 MMHG — LOW (ref 78–95)
PO2 BLDA: 80 MMHG — SIGNIFICANT CHANGE UP (ref 78–95)
PO2 BLDA: 83 MMHG — SIGNIFICANT CHANGE UP (ref 78–95)
PO2 BLDA: 90 MMHG — SIGNIFICANT CHANGE UP (ref 78–95)
POIKILOCYTOSIS BLD QL AUTO: SIGNIFICANT CHANGE UP
POIKILOCYTOSIS BLD QL AUTO: SIGNIFICANT CHANGE UP
POIKILOCYTOSIS BLD QL AUTO: SLIGHT — SIGNIFICANT CHANGE UP
POLYCHROMASIA BLD QL SMEAR: SIGNIFICANT CHANGE UP
POLYCHROMASIA BLD QL SMEAR: SLIGHT — SIGNIFICANT CHANGE UP
POTASSIUM SERPL-MCNC: 3.1 MMOL/L — LOW (ref 3.5–5)
POTASSIUM SERPL-MCNC: 3.3 MMOL/L — LOW (ref 3.5–5)
POTASSIUM SERPL-MCNC: 3.4 MMOL/L — LOW (ref 3.5–5)
POTASSIUM SERPL-MCNC: 3.5 MMOL/L — SIGNIFICANT CHANGE UP (ref 3.5–5)
POTASSIUM SERPL-MCNC: 3.5 MMOL/L — SIGNIFICANT CHANGE UP (ref 3.5–5)
POTASSIUM SERPL-MCNC: 3.6 MMOL/L — SIGNIFICANT CHANGE UP (ref 3.5–5)
POTASSIUM SERPL-MCNC: 3.6 MMOL/L — SIGNIFICANT CHANGE UP (ref 3.5–5)
POTASSIUM SERPL-MCNC: 3.7 MMOL/L — SIGNIFICANT CHANGE UP (ref 3.5–5)
POTASSIUM SERPL-MCNC: 3.7 MMOL/L — SIGNIFICANT CHANGE UP (ref 3.5–5)
POTASSIUM SERPL-MCNC: 3.8 MMOL/L — SIGNIFICANT CHANGE UP (ref 3.5–5)
POTASSIUM SERPL-MCNC: 3.9 MMOL/L — SIGNIFICANT CHANGE UP (ref 3.5–5)
POTASSIUM SERPL-MCNC: 3.9 MMOL/L — SIGNIFICANT CHANGE UP (ref 3.5–5)
POTASSIUM SERPL-MCNC: 4 MMOL/L — SIGNIFICANT CHANGE UP (ref 3.5–5)
POTASSIUM SERPL-MCNC: 4.1 MMOL/L — SIGNIFICANT CHANGE UP (ref 3.5–5)
POTASSIUM SERPL-MCNC: 4.1 MMOL/L — SIGNIFICANT CHANGE UP (ref 3.5–5)
POTASSIUM SERPL-MCNC: 4.3 MMOL/L — SIGNIFICANT CHANGE UP (ref 3.5–5)
POTASSIUM SERPL-MCNC: 4.4 MMOL/L — SIGNIFICANT CHANGE UP (ref 3.5–5)
POTASSIUM SERPL-MCNC: 4.5 MMOL/L — SIGNIFICANT CHANGE UP (ref 3.5–5)
POTASSIUM SERPL-MCNC: 4.6 MMOL/L — SIGNIFICANT CHANGE UP (ref 3.5–5)
POTASSIUM SERPL-MCNC: 4.6 MMOL/L — SIGNIFICANT CHANGE UP (ref 3.5–5)
POTASSIUM SERPL-MCNC: 4.7 MMOL/L — SIGNIFICANT CHANGE UP (ref 3.5–5)
POTASSIUM SERPL-SCNC: 3.1 MMOL/L — LOW (ref 3.5–5)
POTASSIUM SERPL-SCNC: 3.3 MMOL/L — LOW (ref 3.5–5)
POTASSIUM SERPL-SCNC: 3.4 MMOL/L — LOW (ref 3.5–5)
POTASSIUM SERPL-SCNC: 3.5 MMOL/L — SIGNIFICANT CHANGE UP (ref 3.5–5)
POTASSIUM SERPL-SCNC: 3.5 MMOL/L — SIGNIFICANT CHANGE UP (ref 3.5–5)
POTASSIUM SERPL-SCNC: 3.6 MMOL/L — SIGNIFICANT CHANGE UP (ref 3.5–5)
POTASSIUM SERPL-SCNC: 3.6 MMOL/L — SIGNIFICANT CHANGE UP (ref 3.5–5)
POTASSIUM SERPL-SCNC: 3.7 MMOL/L — SIGNIFICANT CHANGE UP (ref 3.5–5)
POTASSIUM SERPL-SCNC: 3.7 MMOL/L — SIGNIFICANT CHANGE UP (ref 3.5–5)
POTASSIUM SERPL-SCNC: 3.8 MMOL/L — SIGNIFICANT CHANGE UP (ref 3.5–5)
POTASSIUM SERPL-SCNC: 3.9 MMOL/L — SIGNIFICANT CHANGE UP (ref 3.5–5)
POTASSIUM SERPL-SCNC: 3.9 MMOL/L — SIGNIFICANT CHANGE UP (ref 3.5–5)
POTASSIUM SERPL-SCNC: 4 MMOL/L — SIGNIFICANT CHANGE UP (ref 3.5–5)
POTASSIUM SERPL-SCNC: 4.1 MMOL/L — SIGNIFICANT CHANGE UP (ref 3.5–5)
POTASSIUM SERPL-SCNC: 4.1 MMOL/L — SIGNIFICANT CHANGE UP (ref 3.5–5)
POTASSIUM SERPL-SCNC: 4.3 MMOL/L — SIGNIFICANT CHANGE UP (ref 3.5–5)
POTASSIUM SERPL-SCNC: 4.4 MMOL/L — SIGNIFICANT CHANGE UP (ref 3.5–5)
POTASSIUM SERPL-SCNC: 4.5 MMOL/L — SIGNIFICANT CHANGE UP (ref 3.5–5)
POTASSIUM SERPL-SCNC: 4.6 MMOL/L — SIGNIFICANT CHANGE UP (ref 3.5–5)
POTASSIUM SERPL-SCNC: 4.6 MMOL/L — SIGNIFICANT CHANGE UP (ref 3.5–5)
POTASSIUM SERPL-SCNC: 4.7 MMOL/L — SIGNIFICANT CHANGE UP (ref 3.5–5)
PROCALCITONIN SERPL-MCNC: 0.43 NG/ML — HIGH (ref 0.02–0.1)
PROCALCITONIN SERPL-MCNC: 0.47 NG/ML — HIGH (ref 0.02–0.1)
PROCALCITONIN SERPL-MCNC: 0.53 NG/ML — HIGH (ref 0.02–0.1)
PROCALCITONIN SERPL-MCNC: 0.56 NG/ML — HIGH (ref 0.02–0.1)
PROCALCITONIN SERPL-MCNC: 0.65 NG/ML — HIGH (ref 0.02–0.1)
PROCALCITONIN SERPL-MCNC: 1.11 NG/ML — HIGH (ref 0.02–0.1)
PROCALCITONIN SERPL-MCNC: 1.11 NG/ML — HIGH (ref 0.02–0.1)
PROT SERPL-MCNC: 4.7 G/DL — LOW (ref 6–8)
PROT SERPL-MCNC: 4.7 G/DL — LOW (ref 6–8)
PROT SERPL-MCNC: 4.9 G/DL — LOW (ref 6–8)
PROT SERPL-MCNC: 4.9 G/DL — LOW (ref 6–8)
PROT SERPL-MCNC: 5 G/DL — LOW (ref 6–8)
PROT SERPL-MCNC: 5.1 G/DL — LOW (ref 6–8)
PROT SERPL-MCNC: 5.1 G/DL — LOW (ref 6–8)
PROT SERPL-MCNC: 5.3 G/DL — LOW (ref 6–8)
PROT SERPL-MCNC: 5.4 G/DL — LOW (ref 6–8)
PROT SERPL-MCNC: 5.5 G/DL — LOW (ref 6–8)
PROT SERPL-MCNC: 5.7 G/DL — LOW (ref 6–8)
PROT SERPL-MCNC: 5.7 G/DL — LOW (ref 6–8)
PROT SERPL-MCNC: 5.9 G/DL — LOW (ref 6–8)
PROT SERPL-MCNC: 5.9 G/DL — LOW (ref 6–8)
PROT SERPL-MCNC: 6 G/DL — SIGNIFICANT CHANGE UP (ref 6–8)
PROT SERPL-MCNC: 6 G/DL — SIGNIFICANT CHANGE UP (ref 6–8)
PROT SERPL-MCNC: 6.1 G/DL — SIGNIFICANT CHANGE UP (ref 6–8)
PROT SERPL-MCNC: 6.2 G/DL — SIGNIFICANT CHANGE UP (ref 6–8)
PROT SERPL-MCNC: 6.2 G/DL — SIGNIFICANT CHANGE UP (ref 6–8)
PROT SERPL-MCNC: 6.4 G/DL — SIGNIFICANT CHANGE UP (ref 6–8)
PROT SERPL-MCNC: 6.7 G/DL — SIGNIFICANT CHANGE UP (ref 6–8)
PROT SERPL-MCNC: 6.8 G/DL — SIGNIFICANT CHANGE UP (ref 6–8)
PROT SERPL-MCNC: 6.9 G/DL — SIGNIFICANT CHANGE UP (ref 6–8)
PROT UR-MCNC: ABNORMAL
PROT UR-MCNC: NEGATIVE — SIGNIFICANT CHANGE UP
PROT UR-MCNC: SIGNIFICANT CHANGE UP
PROTHROM AB SERPL-ACNC: 11.6 SEC — SIGNIFICANT CHANGE UP (ref 9.95–12.87)
PROTHROM AB SERPL-ACNC: 11.6 SEC — SIGNIFICANT CHANGE UP (ref 9.95–12.87)
PROTHROM AB SERPL-ACNC: 12.4 SEC — SIGNIFICANT CHANGE UP (ref 9.95–12.87)
PROTHROM AB SERPL-ACNC: 12.4 SEC — SIGNIFICANT CHANGE UP (ref 9.95–12.87)
PROTHROM AB SERPL-ACNC: 12.5 SEC — SIGNIFICANT CHANGE UP (ref 9.95–12.87)
PROTHROM AB SERPL-ACNC: 12.5 SEC — SIGNIFICANT CHANGE UP (ref 9.95–12.87)
PROTHROM AB SERPL-ACNC: 12.9 SEC — HIGH (ref 9.95–12.87)
PROTHROM AB SERPL-ACNC: 13.2 SEC — HIGH (ref 9.95–12.87)
PROTHROM AB SERPL-ACNC: 13.4 SEC — HIGH (ref 9.95–12.87)
PROTHROM AB SERPL-ACNC: 14.1 SEC — HIGH (ref 9.95–12.87)
RBC # BLD: 2.54 M/UL — LOW (ref 4.2–5.4)
RBC # BLD: 2.56 M/UL — LOW (ref 4.2–5.4)
RBC # BLD: 2.57 M/UL — LOW (ref 4.2–5.4)
RBC # BLD: 2.64 M/UL — LOW (ref 4.2–5.4)
RBC # BLD: 2.64 M/UL — LOW (ref 4.2–5.4)
RBC # BLD: 2.66 M/UL — LOW (ref 4.2–5.4)
RBC # BLD: 2.68 M/UL — LOW (ref 4.2–5.4)
RBC # BLD: 2.68 M/UL — LOW (ref 4.2–5.4)
RBC # BLD: 2.69 M/UL — LOW (ref 4.2–5.4)
RBC # BLD: 2.85 M/UL — LOW (ref 4.2–5.4)
RBC # BLD: 2.89 M/UL — LOW (ref 4.2–5.4)
RBC # BLD: 2.91 M/UL — LOW (ref 4.2–5.4)
RBC # BLD: 2.94 M/UL — LOW (ref 4.2–5.4)
RBC # BLD: 3.05 M/UL — LOW (ref 4.2–5.4)
RBC # BLD: 3.09 M/UL — LOW (ref 4.2–5.4)
RBC # BLD: 3.26 M/UL — LOW (ref 4.2–5.4)
RBC # BLD: 3.26 M/UL — LOW (ref 4.2–5.4)
RBC # BLD: 3.31 M/UL — LOW (ref 4.2–5.4)
RBC # BLD: 3.4 M/UL — LOW (ref 4.2–5.4)
RBC # BLD: 3.42 M/UL — LOW (ref 4.2–5.4)
RBC # BLD: 3.51 M/UL — LOW (ref 4.2–5.4)
RBC # BLD: 3.53 M/UL — LOW (ref 4.2–5.4)
RBC # BLD: 3.53 M/UL — LOW (ref 4.2–5.4)
RBC # BLD: 3.55 M/UL — LOW (ref 4.2–5.4)
RBC # BLD: 3.65 M/UL — LOW (ref 4.2–5.4)
RBC # BLD: 3.8 M/UL — LOW (ref 4.2–5.4)
RBC # BLD: 4.09 M/UL — LOW (ref 4.2–5.4)
RBC # BLD: 4.3 M/UL — SIGNIFICANT CHANGE UP (ref 4.2–5.4)
RBC # BLD: 4.31 M/UL — SIGNIFICANT CHANGE UP (ref 4.2–5.4)
RBC # BLD: 4.55 M/UL — SIGNIFICANT CHANGE UP (ref 4.2–5.4)
RBC # FLD: 15.1 % — HIGH (ref 11.5–14.5)
RBC # FLD: 15.2 % — HIGH (ref 11.5–14.5)
RBC # FLD: 15.2 % — HIGH (ref 11.5–14.5)
RBC # FLD: 15.3 % — HIGH (ref 11.5–14.5)
RBC # FLD: 15.8 % — HIGH (ref 11.5–14.5)
RBC # FLD: 15.8 % — HIGH (ref 11.5–14.5)
RBC # FLD: 15.9 % — HIGH (ref 11.5–14.5)
RBC # FLD: 15.9 % — HIGH (ref 11.5–14.5)
RBC # FLD: 16 % — HIGH (ref 11.5–14.5)
RBC # FLD: 16.5 % — HIGH (ref 11.5–14.5)
RBC # FLD: 16.6 % — HIGH (ref 11.5–14.5)
RBC # FLD: 17.4 % — HIGH (ref 11.5–14.5)
RBC # FLD: 17.4 % — HIGH (ref 11.5–14.5)
RBC # FLD: 17.7 % — HIGH (ref 11.5–14.5)
RBC # FLD: 18.1 % — HIGH (ref 11.5–14.5)
RBC # FLD: 18.2 % — HIGH (ref 11.5–14.5)
RBC # FLD: 18.3 % — HIGH (ref 11.5–14.5)
RBC # FLD: 18.4 % — HIGH (ref 11.5–14.5)
RBC # FLD: 18.4 % — HIGH (ref 11.5–14.5)
RBC # FLD: 18.6 % — HIGH (ref 11.5–14.5)
RBC # FLD: 18.6 % — HIGH (ref 11.5–14.5)
RBC # FLD: 18.7 % — HIGH (ref 11.5–14.5)
RBC # FLD: 18.8 % — HIGH (ref 11.5–14.5)
RBC # FLD: 19.1 % — HIGH (ref 11.5–14.5)
RBC # FLD: 19.2 % — HIGH (ref 11.5–14.5)
RBC # FLD: 19.7 % — HIGH (ref 11.5–14.5)
RBC # FLD: 19.9 % — HIGH (ref 11.5–14.5)
RBC BLD AUTO: ABNORMAL
RBC CASTS # UR COMP ASSIST: 14 /HPF — HIGH (ref 0–4)
RBC CASTS # UR COMP ASSIST: 2 /HPF — SIGNIFICANT CHANGE UP (ref 0–4)
RBC CASTS # UR COMP ASSIST: 3 /HPF — SIGNIFICANT CHANGE UP (ref 0–4)
SAO2 % BLDA: 100 % — HIGH (ref 94–98)
SAO2 % BLDA: 80 % — LOW (ref 94–98)
SAO2 % BLDA: 90 % — LOW (ref 94–98)
SAO2 % BLDA: 90 % — LOW (ref 94–98)
SAO2 % BLDA: 91 % — LOW (ref 94–98)
SAO2 % BLDA: 93 % — LOW (ref 94–98)
SAO2 % BLDA: 94 % — SIGNIFICANT CHANGE UP (ref 94–98)
SAO2 % BLDA: 94 % — SIGNIFICANT CHANGE UP (ref 94–98)
SAO2 % BLDA: 95 % — SIGNIFICANT CHANGE UP (ref 94–98)
SAO2 % BLDA: 95 % — SIGNIFICANT CHANGE UP (ref 94–98)
SAO2 % BLDA: 96 % — SIGNIFICANT CHANGE UP (ref 94–98)
SAO2 % BLDA: 97 % — SIGNIFICANT CHANGE UP (ref 94–98)
SAO2 % BLDA: 98 % — SIGNIFICANT CHANGE UP (ref 94–98)
SAO2 % BLDA: 99 % — HIGH (ref 94–98)
SARS-COV-2 RNA SPEC QL NAA+PROBE: DETECTED
SMUDGE CELLS # BLD: PRESENT — SIGNIFICANT CHANGE UP
SMUDGE CELLS # BLD: PRESENT — SIGNIFICANT CHANGE UP
SODIUM SERPL-SCNC: 130 MMOL/L — LOW (ref 135–146)
SODIUM SERPL-SCNC: 132 MMOL/L — LOW (ref 135–146)
SODIUM SERPL-SCNC: 135 MMOL/L — SIGNIFICANT CHANGE UP (ref 135–146)
SODIUM SERPL-SCNC: 136 MMOL/L — SIGNIFICANT CHANGE UP (ref 135–146)
SODIUM SERPL-SCNC: 137 MMOL/L — SIGNIFICANT CHANGE UP (ref 135–146)
SODIUM SERPL-SCNC: 137 MMOL/L — SIGNIFICANT CHANGE UP (ref 135–146)
SODIUM SERPL-SCNC: 138 MMOL/L — SIGNIFICANT CHANGE UP (ref 135–146)
SODIUM SERPL-SCNC: 138 MMOL/L — SIGNIFICANT CHANGE UP (ref 135–146)
SODIUM SERPL-SCNC: 139 MMOL/L — SIGNIFICANT CHANGE UP (ref 135–146)
SODIUM SERPL-SCNC: 140 MMOL/L — SIGNIFICANT CHANGE UP (ref 135–146)
SODIUM SERPL-SCNC: 141 MMOL/L — SIGNIFICANT CHANGE UP (ref 135–146)
SODIUM SERPL-SCNC: 142 MMOL/L — SIGNIFICANT CHANGE UP (ref 135–146)
SODIUM SERPL-SCNC: 143 MMOL/L — SIGNIFICANT CHANGE UP (ref 135–146)
SODIUM SERPL-SCNC: 144 MMOL/L — SIGNIFICANT CHANGE UP (ref 135–146)
SODIUM SERPL-SCNC: 144 MMOL/L — SIGNIFICANT CHANGE UP (ref 135–146)
SODIUM SERPL-SCNC: 145 MMOL/L — SIGNIFICANT CHANGE UP (ref 135–146)
SODIUM SERPL-SCNC: 146 MMOL/L — SIGNIFICANT CHANGE UP (ref 135–146)
SODIUM SERPL-SCNC: 148 MMOL/L — HIGH (ref 135–146)
SODIUM SERPL-SCNC: 152 MMOL/L — HIGH (ref 135–146)
SP GR SPEC: 1.01 — SIGNIFICANT CHANGE UP (ref 1.01–1.02)
SP GR SPEC: 1.01 — SIGNIFICANT CHANGE UP (ref 1.01–1.02)
SP GR SPEC: 1.02 — SIGNIFICANT CHANGE UP (ref 1.01–1.02)
SPECIMEN SOURCE: SIGNIFICANT CHANGE UP
TRIGL SERPL-MCNC: 261 MG/DL — HIGH (ref 10–149)
TRIGL SERPL-MCNC: 325 MG/DL — HIGH (ref 10–149)
TRIGL SERPL-MCNC: 329 MG/DL — HIGH (ref 10–149)
TROPONIN T SERPL-MCNC: 0.03 NG/ML — CRITICAL HIGH
TROPONIN T SERPL-MCNC: 0.06 NG/ML — CRITICAL HIGH
UROBILINOGEN FLD QL: SIGNIFICANT CHANGE UP
VANCOMYCIN FLD-MCNC: 20.2 UG/ML — HIGH (ref 5–10)
VARIANT LYMPHS # BLD: 1.8 % — SIGNIFICANT CHANGE UP (ref 0–5)
VARIANT LYMPHS # BLD: 2.6 % — SIGNIFICANT CHANGE UP (ref 0–5)
WBC # BLD: 10.28 K/UL — SIGNIFICANT CHANGE UP (ref 4.8–10.8)
WBC # BLD: 10.73 K/UL — SIGNIFICANT CHANGE UP (ref 4.8–10.8)
WBC # BLD: 11.02 K/UL — HIGH (ref 4.8–10.8)
WBC # BLD: 11.04 K/UL — HIGH (ref 4.8–10.8)
WBC # BLD: 12.61 K/UL — HIGH (ref 4.8–10.8)
WBC # BLD: 12.89 K/UL — HIGH (ref 4.8–10.8)
WBC # BLD: 13.2 K/UL — HIGH (ref 4.8–10.8)
WBC # BLD: 15.17 K/UL — HIGH (ref 4.8–10.8)
WBC # BLD: 16.41 K/UL — HIGH (ref 4.8–10.8)
WBC # BLD: 18.23 K/UL — HIGH (ref 4.8–10.8)
WBC # BLD: 19.44 K/UL — HIGH (ref 4.8–10.8)
WBC # BLD: 20.86 K/UL — HIGH (ref 4.8–10.8)
WBC # BLD: 21.45 K/UL — HIGH (ref 4.8–10.8)
WBC # BLD: 21.6 K/UL — HIGH (ref 4.8–10.8)
WBC # BLD: 21.91 K/UL — HIGH (ref 4.8–10.8)
WBC # BLD: 22.43 K/UL — HIGH (ref 4.8–10.8)
WBC # BLD: 33.73 K/UL — HIGH (ref 4.8–10.8)
WBC # BLD: 4.55 K/UL — LOW (ref 4.8–10.8)
WBC # BLD: 5.58 K/UL — SIGNIFICANT CHANGE UP (ref 4.8–10.8)
WBC # BLD: 5.75 K/UL — SIGNIFICANT CHANGE UP (ref 4.8–10.8)
WBC # BLD: 7.16 K/UL — SIGNIFICANT CHANGE UP (ref 4.8–10.8)
WBC # BLD: 7.33 K/UL — SIGNIFICANT CHANGE UP (ref 4.8–10.8)
WBC # BLD: 7.45 K/UL — SIGNIFICANT CHANGE UP (ref 4.8–10.8)
WBC # BLD: 7.53 K/UL — SIGNIFICANT CHANGE UP (ref 4.8–10.8)
WBC # BLD: 7.61 K/UL — SIGNIFICANT CHANGE UP (ref 4.8–10.8)
WBC # BLD: 8.21 K/UL — SIGNIFICANT CHANGE UP (ref 4.8–10.8)
WBC # BLD: 9.1 K/UL — SIGNIFICANT CHANGE UP (ref 4.8–10.8)
WBC # BLD: 9.55 K/UL — SIGNIFICANT CHANGE UP (ref 4.8–10.8)
WBC # BLD: 9.69 K/UL — SIGNIFICANT CHANGE UP (ref 4.8–10.8)
WBC # BLD: 9.8 K/UL — SIGNIFICANT CHANGE UP (ref 4.8–10.8)
WBC # FLD AUTO: 10.28 K/UL — SIGNIFICANT CHANGE UP (ref 4.8–10.8)
WBC # FLD AUTO: 10.73 K/UL — SIGNIFICANT CHANGE UP (ref 4.8–10.8)
WBC # FLD AUTO: 11.02 K/UL — HIGH (ref 4.8–10.8)
WBC # FLD AUTO: 11.04 K/UL — HIGH (ref 4.8–10.8)
WBC # FLD AUTO: 12.61 K/UL — HIGH (ref 4.8–10.8)
WBC # FLD AUTO: 12.89 K/UL — HIGH (ref 4.8–10.8)
WBC # FLD AUTO: 13.2 K/UL — HIGH (ref 4.8–10.8)
WBC # FLD AUTO: 15.17 K/UL — HIGH (ref 4.8–10.8)
WBC # FLD AUTO: 16.41 K/UL — HIGH (ref 4.8–10.8)
WBC # FLD AUTO: 18.23 K/UL — HIGH (ref 4.8–10.8)
WBC # FLD AUTO: 19.44 K/UL — HIGH (ref 4.8–10.8)
WBC # FLD AUTO: 20.86 K/UL — HIGH (ref 4.8–10.8)
WBC # FLD AUTO: 21.45 K/UL — HIGH (ref 4.8–10.8)
WBC # FLD AUTO: 21.6 K/UL — HIGH (ref 4.8–10.8)
WBC # FLD AUTO: 21.91 K/UL — HIGH (ref 4.8–10.8)
WBC # FLD AUTO: 22.43 K/UL — HIGH (ref 4.8–10.8)
WBC # FLD AUTO: 33.73 K/UL — HIGH (ref 4.8–10.8)
WBC # FLD AUTO: 4.55 K/UL — LOW (ref 4.8–10.8)
WBC # FLD AUTO: 5.58 K/UL — SIGNIFICANT CHANGE UP (ref 4.8–10.8)
WBC # FLD AUTO: 5.75 K/UL — SIGNIFICANT CHANGE UP (ref 4.8–10.8)
WBC # FLD AUTO: 7.16 K/UL — SIGNIFICANT CHANGE UP (ref 4.8–10.8)
WBC # FLD AUTO: 7.33 K/UL — SIGNIFICANT CHANGE UP (ref 4.8–10.8)
WBC # FLD AUTO: 7.45 K/UL — SIGNIFICANT CHANGE UP (ref 4.8–10.8)
WBC # FLD AUTO: 7.53 K/UL — SIGNIFICANT CHANGE UP (ref 4.8–10.8)
WBC # FLD AUTO: 7.61 K/UL — SIGNIFICANT CHANGE UP (ref 4.8–10.8)
WBC # FLD AUTO: 8.21 K/UL — SIGNIFICANT CHANGE UP (ref 4.8–10.8)
WBC # FLD AUTO: 9.1 K/UL — SIGNIFICANT CHANGE UP (ref 4.8–10.8)
WBC # FLD AUTO: 9.55 K/UL — SIGNIFICANT CHANGE UP (ref 4.8–10.8)
WBC # FLD AUTO: 9.69 K/UL — SIGNIFICANT CHANGE UP (ref 4.8–10.8)
WBC # FLD AUTO: 9.8 K/UL — SIGNIFICANT CHANGE UP (ref 4.8–10.8)
WBC UR QL: 17 /HPF — HIGH (ref 0–5)
WBC UR QL: 246 /HPF — HIGH (ref 0–5)
WBC UR QL: 3 /HPF — SIGNIFICANT CHANGE UP (ref 0–5)

## 2020-01-01 PROCEDURE — 71045 X-RAY EXAM CHEST 1 VIEW: CPT | Mod: 26

## 2020-01-01 PROCEDURE — 93010 ELECTROCARDIOGRAM REPORT: CPT

## 2020-01-01 PROCEDURE — 93306 TTE W/DOPPLER COMPLETE: CPT | Mod: 26

## 2020-01-01 PROCEDURE — 43246 EGD PLACE GASTROSTOMY TUBE: CPT

## 2020-01-01 PROCEDURE — 71045 X-RAY EXAM CHEST 1 VIEW: CPT | Mod: 26,77

## 2020-01-01 PROCEDURE — 99222 1ST HOSP IP/OBS MODERATE 55: CPT

## 2020-01-01 PROCEDURE — 93010 ELECTROCARDIOGRAM REPORT: CPT | Mod: 77

## 2020-01-01 PROCEDURE — 71045 X-RAY EXAM CHEST 1 VIEW: CPT | Mod: 26,76

## 2020-01-01 PROCEDURE — 99285 EMERGENCY DEPT VISIT HI MDM: CPT

## 2020-01-01 PROCEDURE — 71045 X-RAY EXAM CHEST 1 VIEW: CPT | Mod: 26,76,77

## 2020-01-01 PROCEDURE — 70450 CT HEAD/BRAIN W/O DYE: CPT | Mod: 26

## 2020-01-01 PROCEDURE — 88305 TISSUE EXAM BY PATHOLOGIST: CPT | Mod: 26

## 2020-01-01 PROCEDURE — 74176 CT ABD & PELVIS W/O CONTRAST: CPT | Mod: 26

## 2020-01-01 PROCEDURE — 31600 PLANNED TRACHEOSTOMY: CPT

## 2020-01-01 RX ORDER — SENNA PLUS 8.6 MG/1
2 TABLET ORAL AT BEDTIME
Refills: 0 | Status: DISCONTINUED | OUTPATIENT
Start: 2020-01-01 | End: 2020-01-01

## 2020-01-01 RX ORDER — FUROSEMIDE 40 MG
40 TABLET ORAL DAILY
Refills: 0 | Status: DISCONTINUED | OUTPATIENT
Start: 2020-01-01 | End: 2020-01-01

## 2020-01-01 RX ORDER — FOLIC ACID 0.8 MG
0 TABLET ORAL
Qty: 30 | Refills: 0 | DISCHARGE

## 2020-01-01 RX ORDER — MEROPENEM 1 G/30ML
1000 INJECTION INTRAVENOUS EVERY 8 HOURS
Refills: 0 | Status: DISCONTINUED | OUTPATIENT
Start: 2020-01-01 | End: 2020-01-01

## 2020-01-01 RX ORDER — FENTANYL CITRATE 50 UG/ML
0.3 INJECTION INTRAVENOUS
Qty: 2500 | Refills: 0 | Status: DISCONTINUED | OUTPATIENT
Start: 2020-01-01 | End: 2020-01-01

## 2020-01-01 RX ORDER — CISATRACURIUM BESYLATE 2 MG/ML
3 INJECTION INTRAVENOUS
Qty: 200 | Refills: 0 | Status: DISCONTINUED | OUTPATIENT
Start: 2020-01-01 | End: 2020-01-01

## 2020-01-01 RX ORDER — CHLORHEXIDINE GLUCONATE 213 G/1000ML
1 SOLUTION TOPICAL
Refills: 0 | Status: DISCONTINUED | OUTPATIENT
Start: 2020-01-01 | End: 2020-01-01

## 2020-01-01 RX ORDER — VASOPRESSIN 20 [USP'U]/ML
0.02 INJECTION INTRAVENOUS
Qty: 50 | Refills: 0 | Status: DISCONTINUED | OUTPATIENT
Start: 2020-01-01 | End: 2020-01-01

## 2020-01-01 RX ORDER — DEXMEDETOMIDINE HYDROCHLORIDE IN 0.9% SODIUM CHLORIDE 4 UG/ML
0.2 INJECTION INTRAVENOUS
Qty: 200 | Refills: 0 | Status: DISCONTINUED | OUTPATIENT
Start: 2020-01-01 | End: 2020-01-01

## 2020-01-01 RX ORDER — DEXMEDETOMIDINE HYDROCHLORIDE IN 0.9% SODIUM CHLORIDE 4 UG/ML
1 INJECTION INTRAVENOUS
Qty: 200 | Refills: 0 | Status: DISCONTINUED | OUTPATIENT
Start: 2020-01-01 | End: 2020-01-01

## 2020-01-01 RX ORDER — SODIUM CHLORIDE 9 MG/ML
1000 INJECTION, SOLUTION INTRAVENOUS
Refills: 0 | Status: DISCONTINUED | OUTPATIENT
Start: 2020-01-01 | End: 2020-01-01

## 2020-01-01 RX ORDER — FENOFIBRATE,MICRONIZED 130 MG
0 CAPSULE ORAL
Qty: 30 | Refills: 0 | DISCHARGE

## 2020-01-01 RX ORDER — METOPROLOL TARTRATE 50 MG
0 TABLET ORAL
Qty: 60 | Refills: 0 | DISCHARGE

## 2020-01-01 RX ORDER — ATORVASTATIN CALCIUM 80 MG/1
0 TABLET, FILM COATED ORAL
Qty: 30 | Refills: 0 | DISCHARGE

## 2020-01-01 RX ORDER — DEXTROSE 50 % IN WATER 50 %
15 SYRINGE (ML) INTRAVENOUS ONCE
Refills: 0 | Status: DISCONTINUED | OUTPATIENT
Start: 2020-01-01 | End: 2020-01-01

## 2020-01-01 RX ORDER — INSULIN LISPRO 100/ML
VIAL (ML) SUBCUTANEOUS
Refills: 0 | Status: DISCONTINUED | OUTPATIENT
Start: 2020-01-01 | End: 2020-01-01

## 2020-01-01 RX ORDER — MEROPENEM 1 G/30ML
1000 INJECTION INTRAVENOUS EVERY 12 HOURS
Refills: 0 | Status: DISCONTINUED | OUTPATIENT
Start: 2020-01-01 | End: 2020-01-01

## 2020-01-01 RX ORDER — VANCOMYCIN HCL 1 G
1250 VIAL (EA) INTRAVENOUS ONCE
Refills: 0 | Status: COMPLETED | OUTPATIENT
Start: 2020-01-01 | End: 2020-01-01

## 2020-01-01 RX ORDER — GLUCAGON INJECTION, SOLUTION 0.5 MG/.1ML
1 INJECTION, SOLUTION SUBCUTANEOUS ONCE
Refills: 0 | Status: DISCONTINUED | OUTPATIENT
Start: 2020-01-01 | End: 2020-01-01

## 2020-01-01 RX ORDER — ROCURONIUM BROMIDE 10 MG/ML
70 VIAL (ML) INTRAVENOUS ONCE
Refills: 0 | Status: COMPLETED | OUTPATIENT
Start: 2020-01-01 | End: 2020-01-01

## 2020-01-01 RX ORDER — SODIUM CHLORIDE 9 MG/ML
500 INJECTION INTRAMUSCULAR; INTRAVENOUS; SUBCUTANEOUS ONCE
Refills: 0 | Status: COMPLETED | OUTPATIENT
Start: 2020-01-01 | End: 2020-01-01

## 2020-01-01 RX ORDER — FUROSEMIDE 40 MG
80 TABLET ORAL ONCE
Refills: 0 | Status: COMPLETED | OUTPATIENT
Start: 2020-01-01 | End: 2020-01-01

## 2020-01-01 RX ORDER — PANTOPRAZOLE SODIUM 20 MG/1
40 TABLET, DELAYED RELEASE ORAL DAILY
Refills: 0 | Status: DISCONTINUED | OUTPATIENT
Start: 2020-01-01 | End: 2020-01-01

## 2020-01-01 RX ORDER — HEPARIN SODIUM 5000 [USP'U]/ML
1500 INJECTION INTRAVENOUS; SUBCUTANEOUS
Qty: 25000 | Refills: 0 | Status: DISCONTINUED | OUTPATIENT
Start: 2020-01-01 | End: 2020-01-01

## 2020-01-01 RX ORDER — KETOROLAC TROMETHAMINE 30 MG/ML
15 SYRINGE (ML) INJECTION ONCE
Refills: 0 | Status: DISCONTINUED | OUTPATIENT
Start: 2020-01-01 | End: 2020-01-01

## 2020-01-01 RX ORDER — ANAKINRA 100MG/0.67
100 SYRINGE (ML) SUBCUTANEOUS EVERY 6 HOURS
Refills: 0 | Status: COMPLETED | OUTPATIENT
Start: 2020-01-01 | End: 2020-01-01

## 2020-01-01 RX ORDER — PROPOFOL 10 MG/ML
20 INJECTION, EMULSION INTRAVENOUS ONCE
Refills: 0 | Status: COMPLETED | OUTPATIENT
Start: 2020-01-01 | End: 2020-01-01

## 2020-01-01 RX ORDER — FENTANYL CITRATE 50 UG/ML
0.5 INJECTION INTRAVENOUS
Qty: 2500 | Refills: 0 | Status: DISCONTINUED | OUTPATIENT
Start: 2020-01-01 | End: 2020-01-01

## 2020-01-01 RX ORDER — HYDROCORTISONE 20 MG
100 TABLET ORAL EVERY 8 HOURS
Refills: 0 | Status: DISCONTINUED | OUTPATIENT
Start: 2020-01-01 | End: 2020-01-01

## 2020-01-01 RX ORDER — FOLIC ACID 0.8 MG
1 TABLET ORAL DAILY
Refills: 0 | Status: DISCONTINUED | OUTPATIENT
Start: 2020-01-01 | End: 2020-01-01

## 2020-01-01 RX ORDER — MORPHINE SULFATE 50 MG/1
4 CAPSULE, EXTENDED RELEASE ORAL
Qty: 100 | Refills: 0 | Status: DISCONTINUED | OUTPATIENT
Start: 2020-01-01 | End: 2020-01-01

## 2020-01-01 RX ORDER — ALBUTEROL 90 UG/1
1 AEROSOL, METERED ORAL ONCE
Refills: 0 | Status: COMPLETED | OUTPATIENT
Start: 2020-01-01 | End: 2020-01-01

## 2020-01-01 RX ORDER — CASPOFUNGIN ACETATE 7 MG/ML
70 INJECTION, POWDER, LYOPHILIZED, FOR SOLUTION INTRAVENOUS ONCE
Refills: 0 | Status: COMPLETED | OUTPATIENT
Start: 2020-01-01 | End: 2020-01-01

## 2020-01-01 RX ORDER — HEPARIN SODIUM 5000 [USP'U]/ML
7500 INJECTION INTRAVENOUS; SUBCUTANEOUS EVERY 8 HOURS
Refills: 0 | Status: DISCONTINUED | OUTPATIENT
Start: 2020-01-01 | End: 2020-01-01

## 2020-01-01 RX ORDER — CITALOPRAM 10 MG/1
0 TABLET, FILM COATED ORAL
Qty: 30 | Refills: 0 | DISCHARGE

## 2020-01-01 RX ORDER — POTASSIUM CHLORIDE 20 MEQ
20 PACKET (EA) ORAL ONCE
Refills: 0 | Status: COMPLETED | OUTPATIENT
Start: 2020-01-01 | End: 2020-01-01

## 2020-01-01 RX ORDER — POLYETHYLENE GLYCOL 3350 17 G/17G
17 POWDER, FOR SOLUTION ORAL
Refills: 0 | Status: DISCONTINUED | OUTPATIENT
Start: 2020-01-01 | End: 2020-01-01

## 2020-01-01 RX ORDER — CISATRACURIUM BESYLATE 2 MG/ML
11 INJECTION INTRAVENOUS ONCE
Refills: 0 | Status: COMPLETED | OUTPATIENT
Start: 2020-01-01 | End: 2020-01-01

## 2020-01-01 RX ORDER — CHLORHEXIDINE GLUCONATE 213 G/1000ML
15 SOLUTION TOPICAL
Refills: 0 | Status: DISCONTINUED | OUTPATIENT
Start: 2020-01-01 | End: 2020-01-01

## 2020-01-01 RX ORDER — INSULIN LISPRO 100/ML
6 VIAL (ML) SUBCUTANEOUS EVERY 6 HOURS
Refills: 0 | Status: DISCONTINUED | OUTPATIENT
Start: 2020-01-01 | End: 2020-01-01

## 2020-01-01 RX ORDER — FUROSEMIDE 40 MG
80 TABLET ORAL ONCE
Refills: 0 | Status: DISCONTINUED | OUTPATIENT
Start: 2020-01-01 | End: 2020-01-01

## 2020-01-01 RX ORDER — MAGNESIUM SULFATE 500 MG/ML
2 VIAL (ML) INJECTION ONCE
Refills: 0 | Status: COMPLETED | OUTPATIENT
Start: 2020-01-01 | End: 2020-01-01

## 2020-01-01 RX ORDER — FUROSEMIDE 40 MG
40 TABLET ORAL
Refills: 0 | Status: DISCONTINUED | OUTPATIENT
Start: 2020-01-01 | End: 2020-01-01

## 2020-01-01 RX ORDER — KETAMINE HYDROCHLORIDE 100 MG/ML
200 INJECTION INTRAMUSCULAR; INTRAVENOUS ONCE
Refills: 0 | Status: DISCONTINUED | OUTPATIENT
Start: 2020-01-01 | End: 2020-01-01

## 2020-01-01 RX ORDER — ACETAMINOPHEN 500 MG
1000 TABLET ORAL ONCE
Refills: 0 | Status: COMPLETED | OUTPATIENT
Start: 2020-01-01 | End: 2020-01-01

## 2020-01-01 RX ORDER — NOREPINEPHRINE BITARTRATE/D5W 8 MG/250ML
0.05 PLASTIC BAG, INJECTION (ML) INTRAVENOUS
Qty: 8 | Refills: 0 | Status: DISCONTINUED | OUTPATIENT
Start: 2020-01-01 | End: 2020-01-01

## 2020-01-01 RX ORDER — CASPOFUNGIN ACETATE 7 MG/ML
50 INJECTION, POWDER, LYOPHILIZED, FOR SOLUTION INTRAVENOUS EVERY 24 HOURS
Refills: 0 | Status: DISCONTINUED | OUTPATIENT
Start: 2020-05-05 | End: 2020-01-01

## 2020-01-01 RX ORDER — VANCOMYCIN HCL 1 G
1250 VIAL (EA) INTRAVENOUS EVERY 24 HOURS
Refills: 0 | Status: DISCONTINUED | OUTPATIENT
Start: 2020-05-05 | End: 2020-01-01

## 2020-01-01 RX ORDER — PROPOFOL 10 MG/ML
30 INJECTION, EMULSION INTRAVENOUS
Qty: 1000 | Refills: 0 | Status: DISCONTINUED | OUTPATIENT
Start: 2020-01-01 | End: 2020-01-01

## 2020-01-01 RX ORDER — PIPERACILLIN AND TAZOBACTAM 4; .5 G/20ML; G/20ML
3.38 INJECTION, POWDER, LYOPHILIZED, FOR SOLUTION INTRAVENOUS EVERY 6 HOURS
Refills: 0 | Status: DISCONTINUED | OUTPATIENT
Start: 2020-01-01 | End: 2020-01-01

## 2020-01-01 RX ORDER — PHENYLEPHRINE HYDROCHLORIDE 10 MG/ML
0.1 INJECTION INTRAVENOUS
Qty: 160 | Refills: 0 | Status: DISCONTINUED | OUTPATIENT
Start: 2020-01-01 | End: 2020-01-01

## 2020-01-01 RX ORDER — MIDAZOLAM HYDROCHLORIDE 1 MG/ML
0.02 INJECTION, SOLUTION INTRAMUSCULAR; INTRAVENOUS
Qty: 100 | Refills: 0 | Status: DISCONTINUED | OUTPATIENT
Start: 2020-01-01 | End: 2020-01-01

## 2020-01-01 RX ORDER — DEXTROSE 50 % IN WATER 50 %
12.5 SYRINGE (ML) INTRAVENOUS ONCE
Refills: 0 | Status: DISCONTINUED | OUTPATIENT
Start: 2020-01-01 | End: 2020-01-01

## 2020-01-01 RX ORDER — SODIUM BICARBONATE 1 MEQ/ML
100 SYRINGE (ML) INTRAVENOUS ONCE
Refills: 0 | Status: COMPLETED | OUTPATIENT
Start: 2020-01-01 | End: 2020-01-01

## 2020-01-01 RX ORDER — FUROSEMIDE 40 MG
60 TABLET ORAL ONCE
Refills: 0 | Status: COMPLETED | OUTPATIENT
Start: 2020-01-01 | End: 2020-01-01

## 2020-01-01 RX ORDER — MORPHINE SULFATE 50 MG/1
5 CAPSULE, EXTENDED RELEASE ORAL
Qty: 100 | Refills: 0 | Status: DISCONTINUED | OUTPATIENT
Start: 2020-01-01 | End: 2020-01-01

## 2020-01-01 RX ORDER — ASPIRIN/CALCIUM CARB/MAGNESIUM 324 MG
1 TABLET ORAL
Qty: 0 | Refills: 0 | DISCHARGE

## 2020-01-01 RX ORDER — VANCOMYCIN HCL 1 G
1000 VIAL (EA) INTRAVENOUS EVERY 12 HOURS
Refills: 0 | Status: DISCONTINUED | OUTPATIENT
Start: 2020-01-01 | End: 2020-01-01

## 2020-01-01 RX ORDER — DEXTROSE 50 % IN WATER 50 %
25 SYRINGE (ML) INTRAVENOUS ONCE
Refills: 0 | Status: DISCONTINUED | OUTPATIENT
Start: 2020-01-01 | End: 2020-01-01

## 2020-01-01 RX ORDER — POTASSIUM CHLORIDE 20 MEQ
40 PACKET (EA) ORAL ONCE
Refills: 0 | Status: COMPLETED | OUTPATIENT
Start: 2020-01-01 | End: 2020-01-01

## 2020-01-01 RX ORDER — HYDROXYCHLOROQUINE SULFATE 200 MG
TABLET ORAL
Refills: 0 | Status: COMPLETED | OUTPATIENT
Start: 2020-01-01 | End: 2020-01-01

## 2020-01-01 RX ORDER — METOPROLOL TARTRATE 50 MG
50 TABLET ORAL
Refills: 0 | Status: DISCONTINUED | OUTPATIENT
Start: 2020-01-01 | End: 2020-01-01

## 2020-01-01 RX ORDER — CLOPIDOGREL BISULFATE 75 MG/1
75 TABLET, FILM COATED ORAL DAILY
Refills: 0 | Status: DISCONTINUED | OUTPATIENT
Start: 2020-01-01 | End: 2020-01-01

## 2020-01-01 RX ORDER — SODIUM BICARBONATE 1 MEQ/ML
50 SYRINGE (ML) INTRAVENOUS ONCE
Refills: 0 | Status: COMPLETED | OUTPATIENT
Start: 2020-01-01 | End: 2020-01-01

## 2020-01-01 RX ORDER — INSULIN GLARGINE 100 [IU]/ML
18 INJECTION, SOLUTION SUBCUTANEOUS AT BEDTIME
Refills: 0 | Status: DISCONTINUED | OUTPATIENT
Start: 2020-01-01 | End: 2020-01-01

## 2020-01-01 RX ORDER — ACETAMINOPHEN 500 MG
650 TABLET ORAL EVERY 6 HOURS
Refills: 0 | Status: DISCONTINUED | OUTPATIENT
Start: 2020-01-01 | End: 2020-01-01

## 2020-01-01 RX ORDER — SODIUM CHLORIDE 9 MG/ML
500 INJECTION, SOLUTION INTRAVENOUS ONCE
Refills: 0 | Status: COMPLETED | OUTPATIENT
Start: 2020-01-01 | End: 2020-01-01

## 2020-01-01 RX ORDER — SODIUM BICARBONATE 1 MEQ/ML
0.1 SYRINGE (ML) INTRAVENOUS
Qty: 150 | Refills: 0 | Status: DISCONTINUED | OUTPATIENT
Start: 2020-01-01 | End: 2020-01-01

## 2020-01-01 RX ORDER — POTASSIUM CHLORIDE 20 MEQ
40 PACKET (EA) ORAL EVERY 4 HOURS
Refills: 0 | Status: COMPLETED | OUTPATIENT
Start: 2020-01-01 | End: 2020-01-01

## 2020-01-01 RX ORDER — ASPIRIN/CALCIUM CARB/MAGNESIUM 324 MG
81 TABLET ORAL DAILY
Refills: 0 | Status: DISCONTINUED | OUTPATIENT
Start: 2020-01-01 | End: 2020-01-01

## 2020-01-01 RX ORDER — HYDROXYCHLOROQUINE SULFATE 200 MG
800 TABLET ORAL EVERY 24 HOURS
Refills: 0 | Status: COMPLETED | OUTPATIENT
Start: 2020-01-01 | End: 2020-01-01

## 2020-01-01 RX ORDER — SODIUM BICARBONATE 1 MEQ/ML
0.04 SYRINGE (ML) INTRAVENOUS
Qty: 75 | Refills: 0 | Status: DISCONTINUED | OUTPATIENT
Start: 2020-01-01 | End: 2020-01-01

## 2020-01-01 RX ORDER — NOREPINEPHRINE BITARTRATE/D5W 8 MG/250ML
0.05 PLASTIC BAG, INJECTION (ML) INTRAVENOUS
Qty: 16 | Refills: 0 | Status: DISCONTINUED | OUTPATIENT
Start: 2020-01-01 | End: 2020-01-01

## 2020-01-01 RX ORDER — MORPHINE SULFATE 50 MG/1
4 CAPSULE, EXTENDED RELEASE ORAL ONCE
Refills: 0 | Status: DISCONTINUED | OUTPATIENT
Start: 2020-01-01 | End: 2020-01-01

## 2020-01-01 RX ORDER — HEPARIN SODIUM 5000 [USP'U]/ML
5000 INJECTION INTRAVENOUS; SUBCUTANEOUS EVERY 8 HOURS
Refills: 0 | Status: DISCONTINUED | OUTPATIENT
Start: 2020-01-01 | End: 2020-01-01

## 2020-01-01 RX ORDER — PROPOFOL 10 MG/ML
20.06 INJECTION, EMULSION INTRAVENOUS
Qty: 1000 | Refills: 0 | Status: DISCONTINUED | OUTPATIENT
Start: 2020-01-01 | End: 2020-01-01

## 2020-01-01 RX ORDER — ANAKINRA 100MG/0.67
SYRINGE (ML) SUBCUTANEOUS
Refills: 0 | Status: DISCONTINUED | OUTPATIENT
Start: 2020-01-01 | End: 2020-01-01

## 2020-01-01 RX ORDER — FUROSEMIDE 40 MG
60 TABLET ORAL ONCE
Refills: 0 | Status: DISCONTINUED | OUTPATIENT
Start: 2020-01-01 | End: 2020-01-01

## 2020-01-01 RX ORDER — VANCOMYCIN HCL 1 G
VIAL (EA) INTRAVENOUS
Refills: 0 | Status: DISCONTINUED | OUTPATIENT
Start: 2020-01-01 | End: 2020-01-01

## 2020-01-01 RX ORDER — DILTIAZEM HCL 120 MG
15 CAPSULE, EXT RELEASE 24 HR ORAL ONCE
Refills: 0 | Status: DISCONTINUED | OUTPATIENT
Start: 2020-01-01 | End: 2020-01-01

## 2020-01-01 RX ORDER — HEPARIN SODIUM 5000 [USP'U]/ML
INJECTION INTRAVENOUS; SUBCUTANEOUS
Qty: 25000 | Refills: 0 | Status: DISCONTINUED | OUTPATIENT
Start: 2020-01-01 | End: 2020-01-01

## 2020-01-01 RX ORDER — HEPARIN SODIUM 5000 [USP'U]/ML
1900 INJECTION INTRAVENOUS; SUBCUTANEOUS
Qty: 25000 | Refills: 0 | Status: DISCONTINUED | OUTPATIENT
Start: 2020-01-01 | End: 2020-01-01

## 2020-01-01 RX ORDER — HYDROXYCHLOROQUINE SULFATE 200 MG
400 TABLET ORAL EVERY 24 HOURS
Refills: 0 | Status: COMPLETED | OUTPATIENT
Start: 2020-01-01 | End: 2020-01-01

## 2020-01-01 RX ORDER — CLOPIDOGREL BISULFATE 75 MG/1
0 TABLET, FILM COATED ORAL
Qty: 30 | Refills: 0 | DISCHARGE

## 2020-01-01 RX ORDER — MEROPENEM 1 G/30ML
500 INJECTION INTRAVENOUS EVERY 12 HOURS
Refills: 0 | Status: COMPLETED | OUTPATIENT
Start: 2020-01-01 | End: 2020-01-01

## 2020-01-01 RX ORDER — CHLORHEXIDINE GLUCONATE 213 G/1000ML
15 SOLUTION TOPICAL EVERY 12 HOURS
Refills: 0 | Status: DISCONTINUED | OUTPATIENT
Start: 2020-01-01 | End: 2020-01-01

## 2020-01-01 RX ORDER — VANCOMYCIN HCL 1 G
1500 VIAL (EA) INTRAVENOUS EVERY 12 HOURS
Refills: 0 | Status: DISCONTINUED | OUTPATIENT
Start: 2020-01-01 | End: 2020-01-01

## 2020-01-01 RX ORDER — ROCURONIUM BROMIDE 10 MG/ML
100 VIAL (ML) INTRAVENOUS ONCE
Refills: 0 | Status: COMPLETED | OUTPATIENT
Start: 2020-01-01 | End: 2020-01-01

## 2020-01-01 RX ORDER — MORPHINE SULFATE 50 MG/1
1 CAPSULE, EXTENDED RELEASE ORAL
Qty: 100 | Refills: 0 | Status: DISCONTINUED | OUTPATIENT
Start: 2020-01-01 | End: 2020-01-01

## 2020-01-01 RX ORDER — SODIUM BICARBONATE 1 MEQ/ML
0.13 SYRINGE (ML) INTRAVENOUS
Qty: 150 | Refills: 0 | Status: DISCONTINUED | OUTPATIENT
Start: 2020-01-01 | End: 2020-01-01

## 2020-01-01 RX ORDER — HYDROCORTISONE 20 MG
100 TABLET ORAL ONCE
Refills: 0 | Status: COMPLETED | OUTPATIENT
Start: 2020-01-01 | End: 2020-01-01

## 2020-01-01 RX ADMIN — Medication 6 UNIT(S): at 12:47

## 2020-01-01 RX ADMIN — Medication 1 DROP(S): at 06:37

## 2020-01-01 RX ADMIN — Medication 81 MILLIGRAM(S): at 13:09

## 2020-01-01 RX ADMIN — HEPARIN SODIUM 7500 UNIT(S): 5000 INJECTION INTRAVENOUS; SUBCUTANEOUS at 21:36

## 2020-01-01 RX ADMIN — PANTOPRAZOLE SODIUM 40 MILLIGRAM(S): 20 TABLET, DELAYED RELEASE ORAL at 12:54

## 2020-01-01 RX ADMIN — HEPARIN SODIUM 16 UNIT(S)/HR: 5000 INJECTION INTRAVENOUS; SUBCUTANEOUS at 01:18

## 2020-01-01 RX ADMIN — CHLORHEXIDINE GLUCONATE 1 APPLICATION(S): 213 SOLUTION TOPICAL at 04:31

## 2020-01-01 RX ADMIN — Medication 1: at 11:18

## 2020-01-01 RX ADMIN — Medication 6 UNIT(S): at 12:05

## 2020-01-01 RX ADMIN — Medication 6 UNIT(S): at 00:21

## 2020-01-01 RX ADMIN — Medication 5.53 MICROGRAM(S)/KG/MIN: at 05:05

## 2020-01-01 RX ADMIN — Medication 1 DROP(S): at 18:43

## 2020-01-01 RX ADMIN — Medication 650 MILLIGRAM(S): at 18:00

## 2020-01-01 RX ADMIN — Medication 40 MILLIGRAM(S): at 06:09

## 2020-01-01 RX ADMIN — Medication 1 DROP(S): at 18:02

## 2020-01-01 RX ADMIN — SODIUM CHLORIDE 75 MILLILITER(S): 9 INJECTION, SOLUTION INTRAVENOUS at 16:58

## 2020-01-01 RX ADMIN — Medication 2: at 12:31

## 2020-01-01 RX ADMIN — Medication 40 MILLIGRAM(S): at 05:25

## 2020-01-01 RX ADMIN — Medication 100 MILLIGRAM(S): at 21:50

## 2020-01-01 RX ADMIN — Medication 100 MILLIGRAM(S): at 10:33

## 2020-01-01 RX ADMIN — Medication 6 UNIT(S): at 00:36

## 2020-01-01 RX ADMIN — HEPARIN SODIUM 11 UNIT(S)/HR: 5000 INJECTION INTRAVENOUS; SUBCUTANEOUS at 08:45

## 2020-01-01 RX ADMIN — PROPOFOL 14.2 MICROGRAM(S)/KG/MIN: 10 INJECTION, EMULSION INTRAVENOUS at 19:06

## 2020-01-01 RX ADMIN — CHLORHEXIDINE GLUCONATE 15 MILLILITER(S): 213 SOLUTION TOPICAL at 05:20

## 2020-01-01 RX ADMIN — CHLORHEXIDINE GLUCONATE 15 MILLILITER(S): 213 SOLUTION TOPICAL at 05:01

## 2020-01-01 RX ADMIN — CHLORHEXIDINE GLUCONATE 15 MILLILITER(S): 213 SOLUTION TOPICAL at 18:01

## 2020-01-01 RX ADMIN — Medication 40 MILLIGRAM(S): at 05:20

## 2020-01-01 RX ADMIN — CLOPIDOGREL BISULFATE 75 MILLIGRAM(S): 75 TABLET, FILM COATED ORAL at 13:09

## 2020-01-01 RX ADMIN — CHLORHEXIDINE GLUCONATE 1 APPLICATION(S): 213 SOLUTION TOPICAL at 05:01

## 2020-01-01 RX ADMIN — HEPARIN SODIUM 5000 UNIT(S): 5000 INJECTION INTRAVENOUS; SUBCUTANEOUS at 21:57

## 2020-01-01 RX ADMIN — CHLORHEXIDINE GLUCONATE 1 APPLICATION(S): 213 SOLUTION TOPICAL at 04:54

## 2020-01-01 RX ADMIN — Medication 6 UNIT(S): at 17:46

## 2020-01-01 RX ADMIN — CHLORHEXIDINE GLUCONATE 15 MILLILITER(S): 213 SOLUTION TOPICAL at 05:11

## 2020-01-01 RX ADMIN — PROPOFOL 21.2 MICROGRAM(S)/KG/MIN: 10 INJECTION, EMULSION INTRAVENOUS at 08:59

## 2020-01-01 RX ADMIN — PANTOPRAZOLE SODIUM 40 MILLIGRAM(S): 20 TABLET, DELAYED RELEASE ORAL at 11:25

## 2020-01-01 RX ADMIN — PANTOPRAZOLE SODIUM 40 MILLIGRAM(S): 20 TABLET, DELAYED RELEASE ORAL at 13:43

## 2020-01-01 RX ADMIN — Medication 40 MILLIGRAM(S): at 06:07

## 2020-01-01 RX ADMIN — Medication 1: at 12:28

## 2020-01-01 RX ADMIN — PROPOFOL 14.2 MICROGRAM(S)/KG/MIN: 10 INJECTION, EMULSION INTRAVENOUS at 08:51

## 2020-01-01 RX ADMIN — MEROPENEM 100 MILLIGRAM(S): 1 INJECTION INTRAVENOUS at 06:13

## 2020-01-01 RX ADMIN — Medication 300 MILLIGRAM(S): at 00:15

## 2020-01-01 RX ADMIN — Medication 1 DROP(S): at 17:53

## 2020-01-01 RX ADMIN — INSULIN GLARGINE 18 UNIT(S): 100 INJECTION, SOLUTION SUBCUTANEOUS at 21:29

## 2020-01-01 RX ADMIN — Medication 1 DROP(S): at 16:20

## 2020-01-01 RX ADMIN — Medication 1 MILLIGRAM(S): at 11:19

## 2020-01-01 RX ADMIN — Medication 40 MILLIGRAM(S): at 04:31

## 2020-01-01 RX ADMIN — Medication 1: at 17:05

## 2020-01-01 RX ADMIN — INSULIN GLARGINE 18 UNIT(S): 100 INJECTION, SOLUTION SUBCUTANEOUS at 23:30

## 2020-01-01 RX ADMIN — Medication 1 DROP(S): at 05:11

## 2020-01-01 RX ADMIN — Medication 6 UNIT(S): at 18:42

## 2020-01-01 RX ADMIN — HEPARIN SODIUM 5000 UNIT(S): 5000 INJECTION INTRAVENOUS; SUBCUTANEOUS at 06:22

## 2020-01-01 RX ADMIN — PROPOFOL 14.2 MICROGRAM(S)/KG/MIN: 10 INJECTION, EMULSION INTRAVENOUS at 18:14

## 2020-01-01 RX ADMIN — MEROPENEM 100 MILLIGRAM(S): 1 INJECTION INTRAVENOUS at 04:18

## 2020-01-01 RX ADMIN — Medication 11.1 MICROGRAM(S)/KG/MIN: at 22:51

## 2020-01-01 RX ADMIN — Medication 40 MILLIGRAM(S): at 05:11

## 2020-01-01 RX ADMIN — Medication 6 UNIT(S): at 18:17

## 2020-01-01 RX ADMIN — CHLORHEXIDINE GLUCONATE 15 MILLILITER(S): 213 SOLUTION TOPICAL at 17:03

## 2020-01-01 RX ADMIN — HEPARIN SODIUM 7500 UNIT(S): 5000 INJECTION INTRAVENOUS; SUBCUTANEOUS at 13:27

## 2020-01-01 RX ADMIN — SENNA PLUS 2 TABLET(S): 8.6 TABLET ORAL at 21:53

## 2020-01-01 RX ADMIN — Medication 81 MILLIGRAM(S): at 11:16

## 2020-01-01 RX ADMIN — Medication 70 MEQ/KG/HR: at 17:55

## 2020-01-01 RX ADMIN — HEPARIN SODIUM 15 UNIT(S)/HR: 5000 INJECTION INTRAVENOUS; SUBCUTANEOUS at 18:06

## 2020-01-01 RX ADMIN — Medication 40 MILLIGRAM(S): at 18:09

## 2020-01-01 RX ADMIN — Medication 2: at 17:47

## 2020-01-01 RX ADMIN — Medication 6 UNIT(S): at 06:28

## 2020-01-01 RX ADMIN — CHLORHEXIDINE GLUCONATE 1 APPLICATION(S): 213 SOLUTION TOPICAL at 04:16

## 2020-01-01 RX ADMIN — Medication 0.25 MILLIGRAM(S): at 11:15

## 2020-01-01 RX ADMIN — Medication 6 UNIT(S): at 05:05

## 2020-01-01 RX ADMIN — HEPARIN SODIUM 14 UNIT(S)/HR: 5000 INJECTION INTRAVENOUS; SUBCUTANEOUS at 22:43

## 2020-01-01 RX ADMIN — Medication 6 UNIT(S): at 14:17

## 2020-01-01 RX ADMIN — POLYETHYLENE GLYCOL 3350 17 GRAM(S): 17 POWDER, FOR SOLUTION ORAL at 07:06

## 2020-01-01 RX ADMIN — CHLORHEXIDINE GLUCONATE 15 MILLILITER(S): 213 SOLUTION TOPICAL at 17:26

## 2020-01-01 RX ADMIN — CHLORHEXIDINE GLUCONATE 15 MILLILITER(S): 213 SOLUTION TOPICAL at 17:00

## 2020-01-01 RX ADMIN — Medication 81 MILLIGRAM(S): at 11:09

## 2020-01-01 RX ADMIN — Medication 81 MILLIGRAM(S): at 12:54

## 2020-01-01 RX ADMIN — Medication 81 MILLIGRAM(S): at 11:59

## 2020-01-01 RX ADMIN — Medication 6 UNIT(S): at 12:10

## 2020-01-01 RX ADMIN — Medication 81 MILLIGRAM(S): at 12:33

## 2020-01-01 RX ADMIN — Medication 6 UNIT(S): at 18:15

## 2020-01-01 RX ADMIN — PANTOPRAZOLE SODIUM 40 MILLIGRAM(S): 20 TABLET, DELAYED RELEASE ORAL at 12:00

## 2020-01-01 RX ADMIN — Medication 50 MILLIEQUIVALENT(S): at 12:05

## 2020-01-01 RX ADMIN — Medication 40 MILLIGRAM(S): at 05:01

## 2020-01-01 RX ADMIN — HEPARIN SODIUM 7500 UNIT(S): 5000 INJECTION INTRAVENOUS; SUBCUTANEOUS at 14:15

## 2020-01-01 RX ADMIN — Medication 40 MILLIGRAM(S): at 11:26

## 2020-01-01 RX ADMIN — Medication 6 UNIT(S): at 17:07

## 2020-01-01 RX ADMIN — CHLORHEXIDINE GLUCONATE 15 MILLILITER(S): 213 SOLUTION TOPICAL at 05:25

## 2020-01-01 RX ADMIN — CHLORHEXIDINE GLUCONATE 1 APPLICATION(S): 213 SOLUTION TOPICAL at 06:22

## 2020-01-01 RX ADMIN — Medication 60 MILLIGRAM(S): at 04:00

## 2020-01-01 RX ADMIN — PHENYLEPHRINE HYDROCHLORIDE 2.21 MICROGRAM(S)/KG/MIN: 10 INJECTION INTRAVENOUS at 17:55

## 2020-01-01 RX ADMIN — Medication 6 UNIT(S): at 06:49

## 2020-01-01 RX ADMIN — Medication 1 DROP(S): at 05:58

## 2020-01-01 RX ADMIN — MEROPENEM 100 MILLIGRAM(S): 1 INJECTION INTRAVENOUS at 05:51

## 2020-01-01 RX ADMIN — MEROPENEM 100 MILLIGRAM(S): 1 INJECTION INTRAVENOUS at 18:17

## 2020-01-01 RX ADMIN — Medication 1 DROP(S): at 18:06

## 2020-01-01 RX ADMIN — Medication 6 UNIT(S): at 06:15

## 2020-01-01 RX ADMIN — CHLORHEXIDINE GLUCONATE 15 MILLILITER(S): 213 SOLUTION TOPICAL at 17:01

## 2020-01-01 RX ADMIN — Medication 400 MILLIGRAM(S): at 16:20

## 2020-01-01 RX ADMIN — Medication 1: at 12:56

## 2020-01-01 RX ADMIN — Medication 6 UNIT(S): at 05:58

## 2020-01-01 RX ADMIN — Medication 6 UNIT(S): at 00:25

## 2020-01-01 RX ADMIN — PROPOFOL 14.2 MICROGRAM(S)/KG/MIN: 10 INJECTION, EMULSION INTRAVENOUS at 11:16

## 2020-01-01 RX ADMIN — PROPOFOL 14.2 MICROGRAM(S)/KG/MIN: 10 INJECTION, EMULSION INTRAVENOUS at 22:51

## 2020-01-01 RX ADMIN — SODIUM CHLORIDE 1000 MILLILITER(S): 9 INJECTION, SOLUTION INTRAVENOUS at 13:50

## 2020-01-01 RX ADMIN — Medication 1 MILLIGRAM(S): at 12:56

## 2020-01-01 RX ADMIN — Medication 650 MILLIGRAM(S): at 10:53

## 2020-01-01 RX ADMIN — Medication 40 MILLIGRAM(S): at 05:24

## 2020-01-01 RX ADMIN — CHLORHEXIDINE GLUCONATE 15 MILLILITER(S): 213 SOLUTION TOPICAL at 17:19

## 2020-01-01 RX ADMIN — Medication 100 MILLIGRAM(S): at 18:07

## 2020-01-01 RX ADMIN — Medication 1 MILLIGRAM(S): at 13:09

## 2020-01-01 RX ADMIN — Medication 1 MILLIGRAM(S): at 15:14

## 2020-01-01 RX ADMIN — MEROPENEM 100 MILLIGRAM(S): 1 INJECTION INTRAVENOUS at 17:47

## 2020-01-01 RX ADMIN — Medication 400 MILLIGRAM(S): at 14:43

## 2020-01-01 RX ADMIN — Medication 40 MILLIGRAM(S): at 06:17

## 2020-01-01 RX ADMIN — Medication 1 DROP(S): at 17:52

## 2020-01-01 RX ADMIN — Medication 6 UNIT(S): at 11:18

## 2020-01-01 RX ADMIN — CHLORHEXIDINE GLUCONATE 1 APPLICATION(S): 213 SOLUTION TOPICAL at 03:25

## 2020-01-01 RX ADMIN — CHLORHEXIDINE GLUCONATE 15 MILLILITER(S): 213 SOLUTION TOPICAL at 18:18

## 2020-01-01 RX ADMIN — Medication 1 DROP(S): at 05:25

## 2020-01-01 RX ADMIN — SENNA PLUS 2 TABLET(S): 8.6 TABLET ORAL at 22:34

## 2020-01-01 RX ADMIN — HEPARIN SODIUM 7500 UNIT(S): 5000 INJECTION INTRAVENOUS; SUBCUTANEOUS at 04:17

## 2020-01-01 RX ADMIN — SENNA PLUS 2 TABLET(S): 8.6 TABLET ORAL at 23:33

## 2020-01-01 RX ADMIN — Medication 166.67 MILLIGRAM(S): at 11:46

## 2020-01-01 RX ADMIN — Medication 6 UNIT(S): at 18:06

## 2020-01-01 RX ADMIN — POLYETHYLENE GLYCOL 3350 17 GRAM(S): 17 POWDER, FOR SOLUTION ORAL at 05:24

## 2020-01-01 RX ADMIN — Medication 1: at 06:49

## 2020-01-01 RX ADMIN — PROPOFOL 14.2 MICROGRAM(S)/KG/MIN: 10 INJECTION, EMULSION INTRAVENOUS at 19:02

## 2020-01-01 RX ADMIN — Medication 1 MILLIGRAM(S): at 11:16

## 2020-01-01 RX ADMIN — CHLORHEXIDINE GLUCONATE 15 MILLILITER(S): 213 SOLUTION TOPICAL at 06:37

## 2020-01-01 RX ADMIN — Medication 1 MILLIGRAM(S): at 11:24

## 2020-01-01 RX ADMIN — Medication 6 UNIT(S): at 01:21

## 2020-01-01 RX ADMIN — MEROPENEM 100 MILLIGRAM(S): 1 INJECTION INTRAVENOUS at 00:04

## 2020-01-01 RX ADMIN — Medication 6 UNIT(S): at 12:33

## 2020-01-01 RX ADMIN — CHLORHEXIDINE GLUCONATE 15 MILLILITER(S): 213 SOLUTION TOPICAL at 16:32

## 2020-01-01 RX ADMIN — Medication 11.1 MICROGRAM(S)/KG/MIN: at 08:36

## 2020-01-01 RX ADMIN — PANTOPRAZOLE SODIUM 40 MILLIGRAM(S): 20 TABLET, DELAYED RELEASE ORAL at 12:22

## 2020-01-01 RX ADMIN — CHLORHEXIDINE GLUCONATE 15 MILLILITER(S): 213 SOLUTION TOPICAL at 18:44

## 2020-01-01 RX ADMIN — Medication 40 MILLIGRAM(S): at 17:00

## 2020-01-01 RX ADMIN — PANTOPRAZOLE SODIUM 40 MILLIGRAM(S): 20 TABLET, DELAYED RELEASE ORAL at 12:35

## 2020-01-01 RX ADMIN — Medication 1 DROP(S): at 16:50

## 2020-01-01 RX ADMIN — Medication 650 MILLIGRAM(S): at 21:10

## 2020-01-01 RX ADMIN — Medication 1 DROP(S): at 17:31

## 2020-01-01 RX ADMIN — Medication 81 MILLIGRAM(S): at 10:44

## 2020-01-01 RX ADMIN — Medication 6 UNIT(S): at 13:01

## 2020-01-01 RX ADMIN — Medication 6 UNIT(S): at 17:55

## 2020-01-01 RX ADMIN — Medication 1: at 12:46

## 2020-01-01 RX ADMIN — HEPARIN SODIUM 16 UNIT(S)/HR: 5000 INJECTION INTRAVENOUS; SUBCUTANEOUS at 17:50

## 2020-01-01 RX ADMIN — MORPHINE SULFATE 4 MG/HR: 50 CAPSULE, EXTENDED RELEASE ORAL at 05:09

## 2020-01-01 RX ADMIN — INSULIN GLARGINE 18 UNIT(S): 100 INJECTION, SOLUTION SUBCUTANEOUS at 22:19

## 2020-01-01 RX ADMIN — PANTOPRAZOLE SODIUM 40 MILLIGRAM(S): 20 TABLET, DELAYED RELEASE ORAL at 13:28

## 2020-01-01 RX ADMIN — Medication 1 DROP(S): at 18:18

## 2020-01-01 RX ADMIN — Medication 100 MILLIGRAM(S): at 13:00

## 2020-01-01 RX ADMIN — Medication 40 MILLIGRAM(S): at 05:08

## 2020-01-01 RX ADMIN — VASOPRESSIN 1.2 UNIT(S)/MIN: 20 INJECTION INTRAVENOUS at 16:54

## 2020-01-01 RX ADMIN — POLYETHYLENE GLYCOL 3350 17 GRAM(S): 17 POWDER, FOR SOLUTION ORAL at 17:13

## 2020-01-01 RX ADMIN — Medication 1: at 12:05

## 2020-01-01 RX ADMIN — Medication 100 MILLIGRAM(S): at 00:00

## 2020-01-01 RX ADMIN — VASOPRESSIN 1.2 UNIT(S)/MIN: 20 INJECTION INTRAVENOUS at 05:58

## 2020-01-01 RX ADMIN — CHLORHEXIDINE GLUCONATE 1 APPLICATION(S): 213 SOLUTION TOPICAL at 05:31

## 2020-01-01 RX ADMIN — POLYETHYLENE GLYCOL 3350 17 GRAM(S): 17 POWDER, FOR SOLUTION ORAL at 05:26

## 2020-01-01 RX ADMIN — Medication 6 UNIT(S): at 13:45

## 2020-01-01 RX ADMIN — Medication 6 UNIT(S): at 06:00

## 2020-01-01 RX ADMIN — PANTOPRAZOLE SODIUM 40 MILLIGRAM(S): 20 TABLET, DELAYED RELEASE ORAL at 12:11

## 2020-01-01 RX ADMIN — HEPARIN SODIUM 2100 UNIT(S)/HR: 5000 INJECTION INTRAVENOUS; SUBCUTANEOUS at 15:17

## 2020-01-01 RX ADMIN — INSULIN GLARGINE 18 UNIT(S): 100 INJECTION, SOLUTION SUBCUTANEOUS at 23:51

## 2020-01-01 RX ADMIN — HEPARIN SODIUM 7500 UNIT(S): 5000 INJECTION INTRAVENOUS; SUBCUTANEOUS at 21:52

## 2020-01-01 RX ADMIN — Medication 80 MILLIGRAM(S): at 09:56

## 2020-01-01 RX ADMIN — SENNA PLUS 2 TABLET(S): 8.6 TABLET ORAL at 23:16

## 2020-01-01 RX ADMIN — Medication 50 MILLIEQUIVALENT(S): at 18:27

## 2020-01-01 RX ADMIN — Medication 15 MILLIGRAM(S): at 14:11

## 2020-01-01 RX ADMIN — Medication 6 UNIT(S): at 17:57

## 2020-01-01 RX ADMIN — Medication 40 MILLIGRAM(S): at 05:59

## 2020-01-01 RX ADMIN — Medication 100 MILLIGRAM(S): at 18:11

## 2020-01-01 RX ADMIN — Medication 40 MILLIGRAM(S): at 06:37

## 2020-01-01 RX ADMIN — Medication 1 DROP(S): at 05:16

## 2020-01-01 RX ADMIN — Medication 70 MILLIGRAM(S): at 18:27

## 2020-01-01 RX ADMIN — POLYETHYLENE GLYCOL 3350 17 GRAM(S): 17 POWDER, FOR SOLUTION ORAL at 23:16

## 2020-01-01 RX ADMIN — Medication 81 MILLIGRAM(S): at 11:19

## 2020-01-01 RX ADMIN — Medication 50 GRAM(S): at 21:29

## 2020-01-01 RX ADMIN — Medication 100 MILLIGRAM(S): at 05:29

## 2020-01-01 RX ADMIN — Medication 6 UNIT(S): at 12:38

## 2020-01-01 RX ADMIN — Medication 1: at 06:00

## 2020-01-01 RX ADMIN — Medication 1 DROP(S): at 18:15

## 2020-01-01 RX ADMIN — POLYETHYLENE GLYCOL 3350 17 GRAM(S): 17 POWDER, FOR SOLUTION ORAL at 06:38

## 2020-01-01 RX ADMIN — PANTOPRAZOLE SODIUM 40 MILLIGRAM(S): 20 TABLET, DELAYED RELEASE ORAL at 11:19

## 2020-01-01 RX ADMIN — CISATRACURIUM BESYLATE 11 MILLIGRAM(S): 2 INJECTION INTRAVENOUS at 11:00

## 2020-01-01 RX ADMIN — INSULIN GLARGINE 18 UNIT(S): 100 INJECTION, SOLUTION SUBCUTANEOUS at 22:13

## 2020-01-01 RX ADMIN — Medication 81 MILLIGRAM(S): at 12:23

## 2020-01-01 RX ADMIN — Medication 1 DROP(S): at 17:27

## 2020-01-01 RX ADMIN — CLOPIDOGREL BISULFATE 75 MILLIGRAM(S): 75 TABLET, FILM COATED ORAL at 14:15

## 2020-01-01 RX ADMIN — INSULIN GLARGINE 18 UNIT(S): 100 INJECTION, SOLUTION SUBCUTANEOUS at 22:24

## 2020-01-01 RX ADMIN — CHLORHEXIDINE GLUCONATE 15 MILLILITER(S): 213 SOLUTION TOPICAL at 06:29

## 2020-01-01 RX ADMIN — Medication 60 MILLIGRAM(S): at 06:42

## 2020-01-01 RX ADMIN — INSULIN GLARGINE 18 UNIT(S): 100 INJECTION, SOLUTION SUBCUTANEOUS at 21:45

## 2020-01-01 RX ADMIN — MEROPENEM 100 MILLIGRAM(S): 1 INJECTION INTRAVENOUS at 06:04

## 2020-01-01 RX ADMIN — FENTANYL CITRATE 5.9 MICROGRAM(S)/KG/HR: 50 INJECTION INTRAVENOUS at 15:00

## 2020-01-01 RX ADMIN — Medication 50 GRAM(S): at 11:15

## 2020-01-01 RX ADMIN — MEROPENEM 100 MILLIGRAM(S): 1 INJECTION INTRAVENOUS at 13:20

## 2020-01-01 RX ADMIN — CHLORHEXIDINE GLUCONATE 15 MILLILITER(S): 213 SOLUTION TOPICAL at 21:28

## 2020-01-01 RX ADMIN — ALBUTEROL 1 PUFF(S): 90 AEROSOL, METERED ORAL at 09:50

## 2020-01-01 RX ADMIN — Medication 6 UNIT(S): at 12:55

## 2020-01-01 RX ADMIN — DEXMEDETOMIDINE HYDROCHLORIDE IN 0.9% SODIUM CHLORIDE 5.9 MICROGRAM(S)/KG/HR: 4 INJECTION INTRAVENOUS at 08:35

## 2020-01-01 RX ADMIN — Medication 1 MILLIGRAM(S): at 12:41

## 2020-01-01 RX ADMIN — SODIUM CHLORIDE 75 MILLILITER(S): 9 INJECTION, SOLUTION INTRAVENOUS at 15:13

## 2020-01-01 RX ADMIN — PANTOPRAZOLE SODIUM 40 MILLIGRAM(S): 20 TABLET, DELAYED RELEASE ORAL at 12:56

## 2020-01-01 RX ADMIN — MEROPENEM 100 MILLIGRAM(S): 1 INJECTION INTRAVENOUS at 04:59

## 2020-01-01 RX ADMIN — Medication 81 MILLIGRAM(S): at 14:14

## 2020-01-01 RX ADMIN — MORPHINE SULFATE 2 MG/HR: 50 CAPSULE, EXTENDED RELEASE ORAL at 19:06

## 2020-01-01 RX ADMIN — MEROPENEM 100 MILLIGRAM(S): 1 INJECTION INTRAVENOUS at 17:20

## 2020-01-01 RX ADMIN — Medication 6 UNIT(S): at 18:35

## 2020-01-01 RX ADMIN — Medication 40 MILLIEQUIVALENT(S): at 14:25

## 2020-01-01 RX ADMIN — FENTANYL CITRATE 5.9 MICROGRAM(S)/KG/HR: 50 INJECTION INTRAVENOUS at 00:26

## 2020-01-01 RX ADMIN — Medication 1 DROP(S): at 06:11

## 2020-01-01 RX ADMIN — Medication 6 UNIT(S): at 02:10

## 2020-01-01 RX ADMIN — PANTOPRAZOLE SODIUM 40 MILLIGRAM(S): 20 TABLET, DELAYED RELEASE ORAL at 15:14

## 2020-01-01 RX ADMIN — Medication 100 MILLIGRAM(S): at 06:29

## 2020-01-01 RX ADMIN — Medication 6 UNIT(S): at 13:13

## 2020-01-01 RX ADMIN — Medication 40 MILLIGRAM(S): at 10:44

## 2020-01-01 RX ADMIN — CHLORHEXIDINE GLUCONATE 1 APPLICATION(S): 213 SOLUTION TOPICAL at 05:23

## 2020-01-01 RX ADMIN — SODIUM CHLORIDE 70 MILLILITER(S): 9 INJECTION, SOLUTION INTRAVENOUS at 09:37

## 2020-01-01 RX ADMIN — CHLORHEXIDINE GLUCONATE 15 MILLILITER(S): 213 SOLUTION TOPICAL at 04:31

## 2020-01-01 RX ADMIN — Medication 40 MILLIGRAM(S): at 07:05

## 2020-01-01 RX ADMIN — PANTOPRAZOLE SODIUM 40 MILLIGRAM(S): 20 TABLET, DELAYED RELEASE ORAL at 11:27

## 2020-01-01 RX ADMIN — HEPARIN SODIUM 13 UNIT(S)/HR: 5000 INJECTION INTRAVENOUS; SUBCUTANEOUS at 16:31

## 2020-01-01 RX ADMIN — CHLORHEXIDINE GLUCONATE 1 APPLICATION(S): 213 SOLUTION TOPICAL at 06:04

## 2020-01-01 RX ADMIN — Medication 6 UNIT(S): at 06:35

## 2020-01-01 RX ADMIN — DEXMEDETOMIDINE HYDROCHLORIDE IN 0.9% SODIUM CHLORIDE 5.9 MICROGRAM(S)/KG/HR: 4 INJECTION INTRAVENOUS at 22:51

## 2020-01-01 RX ADMIN — INSULIN GLARGINE 18 UNIT(S): 100 INJECTION, SOLUTION SUBCUTANEOUS at 22:00

## 2020-01-01 RX ADMIN — Medication 6 UNIT(S): at 00:10

## 2020-01-01 RX ADMIN — Medication 1 DROP(S): at 17:11

## 2020-01-01 RX ADMIN — Medication 50 MILLIGRAM(S): at 05:46

## 2020-01-01 RX ADMIN — Medication 1 DROP(S): at 05:00

## 2020-01-01 RX ADMIN — PANTOPRAZOLE SODIUM 40 MILLIGRAM(S): 20 TABLET, DELAYED RELEASE ORAL at 13:09

## 2020-01-01 RX ADMIN — POLYETHYLENE GLYCOL 3350 17 GRAM(S): 17 POWDER, FOR SOLUTION ORAL at 17:30

## 2020-01-01 RX ADMIN — Medication 6 UNIT(S): at 12:28

## 2020-01-01 RX ADMIN — Medication 1: at 12:08

## 2020-01-01 RX ADMIN — HEPARIN SODIUM 7500 UNIT(S): 5000 INJECTION INTRAVENOUS; SUBCUTANEOUS at 12:26

## 2020-01-01 RX ADMIN — Medication 1: at 18:16

## 2020-01-01 RX ADMIN — Medication 1 MILLIGRAM(S): at 10:44

## 2020-01-01 RX ADMIN — CHLORHEXIDINE GLUCONATE 1 APPLICATION(S): 213 SOLUTION TOPICAL at 17:03

## 2020-01-01 RX ADMIN — HEPARIN SODIUM 5000 UNIT(S): 5000 INJECTION INTRAVENOUS; SUBCUTANEOUS at 15:00

## 2020-01-01 RX ADMIN — CHLORHEXIDINE GLUCONATE 1 APPLICATION(S): 213 SOLUTION TOPICAL at 05:10

## 2020-01-01 RX ADMIN — MORPHINE SULFATE 5 MG/HR: 50 CAPSULE, EXTENDED RELEASE ORAL at 08:44

## 2020-01-01 RX ADMIN — Medication 81 MILLIGRAM(S): at 12:10

## 2020-01-01 RX ADMIN — Medication 6 UNIT(S): at 06:06

## 2020-01-01 RX ADMIN — Medication 400 MILLIGRAM(S): at 14:15

## 2020-01-01 RX ADMIN — Medication 1 MILLIGRAM(S): at 12:00

## 2020-01-01 RX ADMIN — Medication 40 MILLIGRAM(S): at 04:54

## 2020-01-01 RX ADMIN — Medication 6 UNIT(S): at 05:53

## 2020-01-01 RX ADMIN — FENTANYL CITRATE 5.9 MICROGRAM(S)/KG/HR: 50 INJECTION INTRAVENOUS at 11:15

## 2020-01-01 RX ADMIN — CHLORHEXIDINE GLUCONATE 1 APPLICATION(S): 213 SOLUTION TOPICAL at 12:16

## 2020-01-01 RX ADMIN — FENTANYL CITRATE 5.9 MICROGRAM(S)/KG/HR: 50 INJECTION INTRAVENOUS at 08:37

## 2020-01-01 RX ADMIN — Medication 6 UNIT(S): at 17:31

## 2020-01-01 RX ADMIN — DEXMEDETOMIDINE HYDROCHLORIDE IN 0.9% SODIUM CHLORIDE 5.9 MICROGRAM(S)/KG/HR: 4 INJECTION INTRAVENOUS at 12:50

## 2020-01-01 RX ADMIN — Medication 1 DROP(S): at 06:21

## 2020-01-01 RX ADMIN — Medication 100 MILLIGRAM(S): at 19:03

## 2020-01-01 RX ADMIN — Medication 81 MILLIGRAM(S): at 18:17

## 2020-01-01 RX ADMIN — Medication 1 DROP(S): at 04:54

## 2020-01-01 RX ADMIN — CHLORHEXIDINE GLUCONATE 15 MILLILITER(S): 213 SOLUTION TOPICAL at 17:52

## 2020-01-01 RX ADMIN — SODIUM CHLORIDE 70 MILLILITER(S): 9 INJECTION, SOLUTION INTRAVENOUS at 05:10

## 2020-01-01 RX ADMIN — DEXMEDETOMIDINE HYDROCHLORIDE IN 0.9% SODIUM CHLORIDE 5.9 MICROGRAM(S)/KG/HR: 4 INJECTION INTRAVENOUS at 05:38

## 2020-01-01 RX ADMIN — POLYETHYLENE GLYCOL 3350 17 GRAM(S): 17 POWDER, FOR SOLUTION ORAL at 05:08

## 2020-01-01 RX ADMIN — Medication 100 MILLIGRAM(S): at 00:31

## 2020-01-01 RX ADMIN — Medication 1 DROP(S): at 05:01

## 2020-01-01 RX ADMIN — MEROPENEM 100 MILLIGRAM(S): 1 INJECTION INTRAVENOUS at 05:59

## 2020-01-01 RX ADMIN — HEPARIN SODIUM 19 UNIT(S)/HR: 5000 INJECTION INTRAVENOUS; SUBCUTANEOUS at 10:00

## 2020-01-01 RX ADMIN — CHLORHEXIDINE GLUCONATE 1 APPLICATION(S): 213 SOLUTION TOPICAL at 05:11

## 2020-01-01 RX ADMIN — KETAMINE HYDROCHLORIDE 200 MILLIGRAM(S): 100 INJECTION INTRAMUSCULAR; INTRAVENOUS at 14:25

## 2020-01-01 RX ADMIN — PANTOPRAZOLE SODIUM 40 MILLIGRAM(S): 20 TABLET, DELAYED RELEASE ORAL at 10:44

## 2020-01-01 RX ADMIN — Medication 1 MILLIGRAM(S): at 12:22

## 2020-01-01 RX ADMIN — INSULIN GLARGINE 18 UNIT(S): 100 INJECTION, SOLUTION SUBCUTANEOUS at 22:10

## 2020-01-01 RX ADMIN — CHLORHEXIDINE GLUCONATE 1 APPLICATION(S): 213 SOLUTION TOPICAL at 03:50

## 2020-01-01 RX ADMIN — SENNA PLUS 2 TABLET(S): 8.6 TABLET ORAL at 21:46

## 2020-01-01 RX ADMIN — MEROPENEM 100 MILLIGRAM(S): 1 INJECTION INTRAVENOUS at 17:11

## 2020-01-01 RX ADMIN — CHLORHEXIDINE GLUCONATE 1 APPLICATION(S): 213 SOLUTION TOPICAL at 05:25

## 2020-01-01 RX ADMIN — Medication 60 MILLIGRAM(S): at 18:50

## 2020-01-01 RX ADMIN — POLYETHYLENE GLYCOL 3350 17 GRAM(S): 17 POWDER, FOR SOLUTION ORAL at 05:59

## 2020-01-01 RX ADMIN — HEPARIN SODIUM 5000 UNIT(S): 5000 INJECTION INTRAVENOUS; SUBCUTANEOUS at 21:29

## 2020-01-01 RX ADMIN — Medication 1: at 14:17

## 2020-01-01 RX ADMIN — HEPARIN SODIUM 5000 UNIT(S): 5000 INJECTION INTRAVENOUS; SUBCUTANEOUS at 13:18

## 2020-01-01 RX ADMIN — SENNA PLUS 2 TABLET(S): 8.6 TABLET ORAL at 23:19

## 2020-01-01 RX ADMIN — PROPOFOL 14.2 MICROGRAM(S)/KG/MIN: 10 INJECTION, EMULSION INTRAVENOUS at 05:05

## 2020-01-01 RX ADMIN — Medication 100 MILLIEQUIVALENT(S): at 20:51

## 2020-01-01 RX ADMIN — Medication 6 UNIT(S): at 13:04

## 2020-01-01 RX ADMIN — Medication 100 MILLIEQUIVALENT(S): at 17:54

## 2020-01-01 RX ADMIN — Medication 6 UNIT(S): at 11:34

## 2020-01-01 RX ADMIN — PANTOPRAZOLE SODIUM 40 MILLIGRAM(S): 20 TABLET, DELAYED RELEASE ORAL at 14:15

## 2020-01-01 RX ADMIN — Medication 6 UNIT(S): at 07:06

## 2020-01-01 RX ADMIN — HEPARIN SODIUM 7500 UNIT(S): 5000 INJECTION INTRAVENOUS; SUBCUTANEOUS at 06:37

## 2020-01-01 RX ADMIN — Medication 6 UNIT(S): at 01:28

## 2020-01-01 RX ADMIN — Medication 11.1 MICROGRAM(S)/KG/MIN: at 13:23

## 2020-01-01 RX ADMIN — Medication 100 MILLIGRAM(S): at 04:32

## 2020-01-01 RX ADMIN — Medication 6 UNIT(S): at 01:14

## 2020-01-01 RX ADMIN — Medication 6 UNIT(S): at 13:14

## 2020-01-01 RX ADMIN — Medication 400 MILLIGRAM(S): at 14:27

## 2020-01-01 RX ADMIN — CISATRACURIUM BESYLATE 21.2 MICROGRAM(S)/KG/MIN: 2 INJECTION INTRAVENOUS at 11:00

## 2020-01-01 RX ADMIN — Medication 1 MILLIGRAM(S): at 15:29

## 2020-01-01 RX ADMIN — Medication 60 MILLIGRAM(S): at 04:27

## 2020-01-01 RX ADMIN — Medication 81 MILLIGRAM(S): at 13:44

## 2020-01-01 RX ADMIN — HEPARIN SODIUM 7500 UNIT(S): 5000 INJECTION INTRAVENOUS; SUBCUTANEOUS at 05:56

## 2020-01-01 RX ADMIN — Medication 60 MILLIGRAM(S): at 06:22

## 2020-01-01 RX ADMIN — MEROPENEM 100 MILLIGRAM(S): 1 INJECTION INTRAVENOUS at 17:04

## 2020-01-01 RX ADMIN — PANTOPRAZOLE SODIUM 40 MILLIGRAM(S): 20 TABLET, DELAYED RELEASE ORAL at 11:17

## 2020-01-01 RX ADMIN — HEPARIN SODIUM 7500 UNIT(S): 5000 INJECTION INTRAVENOUS; SUBCUTANEOUS at 14:57

## 2020-01-01 RX ADMIN — Medication 40 MILLIEQUIVALENT(S): at 15:15

## 2020-01-01 RX ADMIN — MEROPENEM 100 MILLIGRAM(S): 1 INJECTION INTRAVENOUS at 17:31

## 2020-01-01 RX ADMIN — HEPARIN SODIUM 7500 UNIT(S): 5000 INJECTION INTRAVENOUS; SUBCUTANEOUS at 13:04

## 2020-01-01 RX ADMIN — Medication 40 MILLIGRAM(S): at 17:16

## 2020-01-01 RX ADMIN — Medication 1: at 17:55

## 2020-01-01 RX ADMIN — Medication 5.53 MICROGRAM(S)/KG/MIN: at 15:35

## 2020-01-01 RX ADMIN — CHLORHEXIDINE GLUCONATE 15 MILLILITER(S): 213 SOLUTION TOPICAL at 05:58

## 2020-01-01 RX ADMIN — Medication 1 MILLIGRAM(S): at 13:28

## 2020-01-01 RX ADMIN — Medication 6 UNIT(S): at 12:08

## 2020-01-01 RX ADMIN — CASPOFUNGIN ACETATE 260 MILLIGRAM(S): 7 INJECTION, POWDER, LYOPHILIZED, FOR SOLUTION INTRAVENOUS at 12:59

## 2020-01-01 RX ADMIN — Medication 1 MILLIGRAM(S): at 12:54

## 2020-01-01 RX ADMIN — Medication 100 MEQ/KG/HR: at 17:55

## 2020-01-01 RX ADMIN — PANTOPRAZOLE SODIUM 40 MILLIGRAM(S): 20 TABLET, DELAYED RELEASE ORAL at 11:10

## 2020-01-01 RX ADMIN — Medication 6 UNIT(S): at 17:15

## 2020-01-01 RX ADMIN — POLYETHYLENE GLYCOL 3350 17 GRAM(S): 17 POWDER, FOR SOLUTION ORAL at 17:31

## 2020-01-01 RX ADMIN — Medication 1 DROP(S): at 18:08

## 2020-01-01 RX ADMIN — Medication 6 UNIT(S): at 17:28

## 2020-01-01 RX ADMIN — Medication 100 MILLIGRAM(S): at 15:48

## 2020-01-01 RX ADMIN — PROPOFOL 14.2 MICROGRAM(S)/KG/MIN: 10 INJECTION, EMULSION INTRAVENOUS at 13:22

## 2020-01-01 RX ADMIN — MEROPENEM 100 MILLIGRAM(S): 1 INJECTION INTRAVENOUS at 06:42

## 2020-01-01 RX ADMIN — SENNA PLUS 2 TABLET(S): 8.6 TABLET ORAL at 22:13

## 2020-01-01 RX ADMIN — Medication 6 UNIT(S): at 00:32

## 2020-01-01 RX ADMIN — Medication 70 MEQ/KG/HR: at 11:10

## 2020-01-01 RX ADMIN — Medication 6 UNIT(S): at 17:05

## 2020-01-01 RX ADMIN — Medication 80 MILLIGRAM(S): at 18:12

## 2020-01-01 RX ADMIN — MEROPENEM 100 MILLIGRAM(S): 1 INJECTION INTRAVENOUS at 04:27

## 2020-01-01 RX ADMIN — Medication 1: at 13:01

## 2020-01-01 RX ADMIN — HEPARIN SODIUM 7500 UNIT(S): 5000 INJECTION INTRAVENOUS; SUBCUTANEOUS at 05:24

## 2020-01-01 RX ADMIN — Medication 1 DROP(S): at 06:14

## 2020-01-01 RX ADMIN — VASOPRESSIN 1.2 UNIT(S)/MIN: 20 INJECTION INTRAVENOUS at 02:15

## 2020-01-01 RX ADMIN — Medication 1 MILLIGRAM(S): at 13:44

## 2020-01-01 RX ADMIN — Medication 650 MILLIGRAM(S): at 17:05

## 2020-01-01 RX ADMIN — MEROPENEM 100 MILLIGRAM(S): 1 INJECTION INTRAVENOUS at 05:41

## 2020-01-01 RX ADMIN — Medication 6 UNIT(S): at 22:09

## 2020-01-01 RX ADMIN — POLYETHYLENE GLYCOL 3350 17 GRAM(S): 17 POWDER, FOR SOLUTION ORAL at 18:07

## 2020-01-01 RX ADMIN — Medication 5.53 MICROGRAM(S)/KG/MIN: at 16:30

## 2020-01-01 RX ADMIN — Medication 0: at 13:53

## 2020-01-01 RX ADMIN — SENNA PLUS 2 TABLET(S): 8.6 TABLET ORAL at 21:55

## 2020-01-01 RX ADMIN — Medication 81 MILLIGRAM(S): at 15:29

## 2020-01-01 RX ADMIN — MEROPENEM 100 MILLIGRAM(S): 1 INJECTION INTRAVENOUS at 17:13

## 2020-01-01 RX ADMIN — CHLORHEXIDINE GLUCONATE 15 MILLILITER(S): 213 SOLUTION TOPICAL at 18:07

## 2020-01-01 RX ADMIN — Medication 11.1 MICROGRAM(S)/KG/MIN: at 05:39

## 2020-01-01 RX ADMIN — INSULIN GLARGINE 18 UNIT(S): 100 INJECTION, SOLUTION SUBCUTANEOUS at 21:36

## 2020-01-01 RX ADMIN — SODIUM CHLORIDE 2000 MILLILITER(S): 9 INJECTION INTRAMUSCULAR; INTRAVENOUS; SUBCUTANEOUS at 02:31

## 2020-01-01 RX ADMIN — Medication 100 MILLIGRAM(S): at 14:25

## 2020-01-01 RX ADMIN — FENTANYL CITRATE 5.9 MICROGRAM(S)/KG/HR: 50 INJECTION INTRAVENOUS at 22:50

## 2020-01-01 RX ADMIN — Medication 1: at 07:00

## 2020-01-01 RX ADMIN — Medication 1 MILLIGRAM(S): at 11:27

## 2020-01-01 RX ADMIN — Medication 5.53 MICROGRAM(S)/KG/MIN: at 08:45

## 2020-01-01 RX ADMIN — Medication 60 MILLIGRAM(S): at 11:26

## 2020-01-01 RX ADMIN — CHLORHEXIDINE GLUCONATE 15 MILLILITER(S): 213 SOLUTION TOPICAL at 04:54

## 2020-01-01 RX ADMIN — HEPARIN SODIUM 11 UNIT(S)/HR: 5000 INJECTION INTRAVENOUS; SUBCUTANEOUS at 11:15

## 2020-01-01 RX ADMIN — Medication 80 MILLIGRAM(S): at 07:55

## 2020-01-01 RX ADMIN — HEPARIN SODIUM 11 UNIT(S)/HR: 5000 INJECTION INTRAVENOUS; SUBCUTANEOUS at 15:34

## 2020-01-01 RX ADMIN — Medication 1 MILLIGRAM(S): at 12:10

## 2020-01-01 RX ADMIN — MORPHINE SULFATE 4 MILLIGRAM(S): 50 CAPSULE, EXTENDED RELEASE ORAL at 11:10

## 2020-01-01 RX ADMIN — Medication 0: at 17:23

## 2020-01-01 RX ADMIN — PANTOPRAZOLE SODIUM 40 MILLIGRAM(S): 20 TABLET, DELAYED RELEASE ORAL at 11:16

## 2020-01-01 RX ADMIN — Medication 1 DROP(S): at 17:29

## 2020-01-01 RX ADMIN — CHLORHEXIDINE GLUCONATE 15 MILLILITER(S): 213 SOLUTION TOPICAL at 17:31

## 2020-01-01 RX ADMIN — Medication 60 MILLIGRAM(S): at 04:18

## 2020-01-01 RX ADMIN — Medication 11.1 MICROGRAM(S)/KG/MIN: at 00:22

## 2020-01-01 RX ADMIN — POLYETHYLENE GLYCOL 3350 17 GRAM(S): 17 POWDER, FOR SOLUTION ORAL at 18:22

## 2020-01-01 RX ADMIN — HEPARIN SODIUM 5000 UNIT(S): 5000 INJECTION INTRAVENOUS; SUBCUTANEOUS at 04:00

## 2020-01-01 RX ADMIN — Medication 1 DROP(S): at 05:10

## 2020-01-01 RX ADMIN — INSULIN GLARGINE 18 UNIT(S): 100 INJECTION, SOLUTION SUBCUTANEOUS at 21:57

## 2020-01-01 RX ADMIN — DEXMEDETOMIDINE HYDROCHLORIDE IN 0.9% SODIUM CHLORIDE 5.9 MICROGRAM(S)/KG/HR: 4 INJECTION INTRAVENOUS at 10:45

## 2020-01-01 RX ADMIN — Medication 81 MILLIGRAM(S): at 11:27

## 2020-01-01 RX ADMIN — Medication 81 MILLIGRAM(S): at 11:24

## 2020-01-01 RX ADMIN — POLYETHYLENE GLYCOL 3350 17 GRAM(S): 17 POWDER, FOR SOLUTION ORAL at 18:08

## 2020-01-01 RX ADMIN — Medication 2: at 06:14

## 2020-01-01 RX ADMIN — Medication 1: at 08:05

## 2020-01-01 RX ADMIN — FENTANYL CITRATE 5.9 MICROGRAM(S)/KG/HR: 50 INJECTION INTRAVENOUS at 05:36

## 2020-01-01 RX ADMIN — Medication 40 MILLIGRAM(S): at 05:53

## 2020-01-01 RX ADMIN — Medication 650 MILLIGRAM(S): at 07:30

## 2020-01-01 RX ADMIN — POLYETHYLENE GLYCOL 3350 17 GRAM(S): 17 POWDER, FOR SOLUTION ORAL at 18:18

## 2020-01-01 RX ADMIN — Medication 400 MILLIGRAM(S): at 13:18

## 2020-01-01 RX ADMIN — CHLORHEXIDINE GLUCONATE 15 MILLILITER(S): 213 SOLUTION TOPICAL at 18:16

## 2020-01-01 RX ADMIN — POLYETHYLENE GLYCOL 3350 17 GRAM(S): 17 POWDER, FOR SOLUTION ORAL at 04:55

## 2020-01-01 RX ADMIN — INSULIN GLARGINE 18 UNIT(S): 100 INJECTION, SOLUTION SUBCUTANEOUS at 21:28

## 2020-01-01 RX ADMIN — Medication 40 MILLIGRAM(S): at 06:13

## 2020-01-01 RX ADMIN — Medication 6 UNIT(S): at 07:00

## 2020-01-01 RX ADMIN — CHLORHEXIDINE GLUCONATE 1 APPLICATION(S): 213 SOLUTION TOPICAL at 06:37

## 2020-01-01 RX ADMIN — Medication 1 DROP(S): at 05:12

## 2020-01-01 RX ADMIN — Medication 81 MILLIGRAM(S): at 15:14

## 2020-01-01 RX ADMIN — Medication 800 MILLIGRAM(S): at 00:00

## 2020-01-01 RX ADMIN — Medication 60 MILLIGRAM(S): at 05:41

## 2020-01-01 RX ADMIN — MEROPENEM 100 MILLIGRAM(S): 1 INJECTION INTRAVENOUS at 18:19

## 2020-01-01 RX ADMIN — CHLORHEXIDINE GLUCONATE 15 MILLILITER(S): 213 SOLUTION TOPICAL at 05:08

## 2020-01-01 RX ADMIN — Medication 6 UNIT(S): at 16:58

## 2020-01-01 RX ADMIN — Medication 60 MILLIGRAM(S): at 17:21

## 2020-01-01 RX ADMIN — Medication 40 MILLIEQUIVALENT(S): at 12:41

## 2020-01-01 RX ADMIN — Medication 81 MILLIGRAM(S): at 12:56

## 2020-01-01 RX ADMIN — VASOPRESSIN 1.2 UNIT(S)/MIN: 20 INJECTION INTRAVENOUS at 22:50

## 2020-01-01 RX ADMIN — Medication 6 UNIT(S): at 23:47

## 2020-01-01 RX ADMIN — CHLORHEXIDINE GLUCONATE 1 APPLICATION(S): 213 SOLUTION TOPICAL at 05:22

## 2020-01-01 RX ADMIN — Medication 1: at 12:10

## 2020-01-01 RX ADMIN — Medication 2: at 13:14

## 2020-01-01 RX ADMIN — Medication 81 MILLIGRAM(S): at 12:11

## 2020-01-01 RX ADMIN — Medication 40 MILLIGRAM(S): at 06:32

## 2020-01-01 RX ADMIN — Medication 40 MILLIGRAM(S): at 05:58

## 2020-01-01 RX ADMIN — HEPARIN SODIUM 13 UNIT(S)/HR: 5000 INJECTION INTRAVENOUS; SUBCUTANEOUS at 22:50

## 2020-01-01 RX ADMIN — PROPOFOL 14.2 MICROGRAM(S)/KG/MIN: 10 INJECTION, EMULSION INTRAVENOUS at 08:00

## 2020-01-01 RX ADMIN — Medication 60 MILLIGRAM(S): at 05:46

## 2020-01-01 RX ADMIN — Medication 1 DROP(S): at 17:01

## 2020-01-01 RX ADMIN — FENTANYL CITRATE 5.9 MICROGRAM(S)/KG/HR: 50 INJECTION INTRAVENOUS at 13:00

## 2020-01-01 RX ADMIN — Medication 1 DROP(S): at 05:21

## 2020-01-01 RX ADMIN — CHLORHEXIDINE GLUCONATE 15 MILLILITER(S): 213 SOLUTION TOPICAL at 06:04

## 2020-01-01 RX ADMIN — CHLORHEXIDINE GLUCONATE 15 MILLILITER(S): 213 SOLUTION TOPICAL at 05:22

## 2020-01-01 RX ADMIN — Medication 1 MILLIGRAM(S): at 12:34

## 2020-01-01 RX ADMIN — CLOPIDOGREL BISULFATE 75 MILLIGRAM(S): 75 TABLET, FILM COATED ORAL at 11:27

## 2020-01-01 RX ADMIN — PANTOPRAZOLE SODIUM 40 MILLIGRAM(S): 20 TABLET, DELAYED RELEASE ORAL at 15:29

## 2020-01-01 RX ADMIN — INSULIN GLARGINE 18 UNIT(S): 100 INJECTION, SOLUTION SUBCUTANEOUS at 23:16

## 2020-01-01 RX ADMIN — SENNA PLUS 2 TABLET(S): 8.6 TABLET ORAL at 22:31

## 2020-01-01 RX ADMIN — CHLORHEXIDINE GLUCONATE 1 APPLICATION(S): 213 SOLUTION TOPICAL at 05:12

## 2020-01-01 RX ADMIN — Medication 650 MILLIGRAM(S): at 05:10

## 2020-01-01 RX ADMIN — POLYETHYLENE GLYCOL 3350 17 GRAM(S): 17 POWDER, FOR SOLUTION ORAL at 05:21

## 2020-01-01 RX ADMIN — CHLORHEXIDINE GLUCONATE 15 MILLILITER(S): 213 SOLUTION TOPICAL at 16:18

## 2020-01-01 RX ADMIN — SENNA PLUS 2 TABLET(S): 8.6 TABLET ORAL at 21:29

## 2020-01-01 RX ADMIN — Medication 60 MILLIGRAM(S): at 18:14

## 2020-01-01 RX ADMIN — MEROPENEM 100 MILLIGRAM(S): 1 INJECTION INTRAVENOUS at 18:14

## 2020-01-01 RX ADMIN — DEXMEDETOMIDINE HYDROCHLORIDE IN 0.9% SODIUM CHLORIDE 5.9 MICROGRAM(S)/KG/HR: 4 INJECTION INTRAVENOUS at 00:23

## 2020-01-01 RX ADMIN — Medication 6 UNIT(S): at 18:14

## 2020-01-01 RX ADMIN — CHLORHEXIDINE GLUCONATE 15 MILLILITER(S): 213 SOLUTION TOPICAL at 05:53

## 2020-01-01 RX ADMIN — CHLORHEXIDINE GLUCONATE 15 MILLILITER(S): 213 SOLUTION TOPICAL at 18:08

## 2020-01-01 RX ADMIN — CHLORHEXIDINE GLUCONATE 15 MILLILITER(S): 213 SOLUTION TOPICAL at 06:17

## 2020-01-01 RX ADMIN — HEPARIN SODIUM 7500 UNIT(S): 5000 INJECTION INTRAVENOUS; SUBCUTANEOUS at 22:03

## 2020-01-01 RX ADMIN — Medication 1: at 11:34

## 2020-01-01 RX ADMIN — Medication 6 UNIT(S): at 06:38

## 2020-01-01 RX ADMIN — CHLORHEXIDINE GLUCONATE 15 MILLILITER(S): 213 SOLUTION TOPICAL at 17:56

## 2020-01-01 RX ADMIN — CHLORHEXIDINE GLUCONATE 15 MILLILITER(S): 213 SOLUTION TOPICAL at 17:28

## 2020-01-01 RX ADMIN — CHLORHEXIDINE GLUCONATE 15 MILLILITER(S): 213 SOLUTION TOPICAL at 17:53

## 2020-01-01 RX ADMIN — Medication 60 MILLIGRAM(S): at 04:31

## 2020-01-01 RX ADMIN — Medication 2: at 06:19

## 2020-01-01 RX ADMIN — Medication 1 MILLIGRAM(S): at 14:15

## 2020-01-01 RX ADMIN — Medication 1 DROP(S): at 05:22

## 2020-01-01 RX ADMIN — MEROPENEM 100 MILLIGRAM(S): 1 INJECTION INTRAVENOUS at 05:07

## 2020-01-01 RX ADMIN — CHLORHEXIDINE GLUCONATE 15 MILLILITER(S): 213 SOLUTION TOPICAL at 06:21

## 2020-01-01 RX ADMIN — SODIUM CHLORIDE 100 MILLILITER(S): 9 INJECTION, SOLUTION INTRAVENOUS at 13:07

## 2020-01-01 RX ADMIN — Medication 40 MILLIEQUIVALENT(S): at 19:02

## 2020-01-01 RX ADMIN — Medication 6 UNIT(S): at 17:19

## 2020-01-01 RX ADMIN — HEPARIN SODIUM 7500 UNIT(S): 5000 INJECTION INTRAVENOUS; SUBCUTANEOUS at 22:34

## 2020-01-01 RX ADMIN — POLYETHYLENE GLYCOL 3350 17 GRAM(S): 17 POWDER, FOR SOLUTION ORAL at 18:43

## 2020-01-01 RX ADMIN — Medication 6 UNIT(S): at 00:35

## 2020-01-01 RX ADMIN — PANTOPRAZOLE SODIUM 40 MILLIGRAM(S): 20 TABLET, DELAYED RELEASE ORAL at 12:41

## 2020-01-01 RX ADMIN — Medication 125 MILLIGRAM(S): at 09:50

## 2020-01-01 RX ADMIN — POLYETHYLENE GLYCOL 3350 17 GRAM(S): 17 POWDER, FOR SOLUTION ORAL at 17:11

## 2020-01-01 RX ADMIN — PROPOFOL 20 MILLIGRAM(S): 10 INJECTION, EMULSION INTRAVENOUS at 06:23

## 2020-01-01 RX ADMIN — Medication 81 MILLIGRAM(S): at 12:41

## 2020-01-01 RX ADMIN — Medication 1 MILLIGRAM(S): at 11:09

## 2020-01-01 RX ADMIN — DEXMEDETOMIDINE HYDROCHLORIDE IN 0.9% SODIUM CHLORIDE 5.9 MICROGRAM(S)/KG/HR: 4 INJECTION INTRAVENOUS at 10:56

## 2020-04-13 NOTE — ED PROVIDER NOTE - CARE PLAN
Principal Discharge DX:	Asthma exacerbation  Secondary Diagnosis:	SOB (shortness of breath)  Secondary Diagnosis:	Hypoxia

## 2020-04-13 NOTE — ED PROVIDER NOTE - NS ED ROS FT
Eyes:  No visual changes, eye pain or discharge.  ENMT:  No hearing changes, pain, no sore throat or runny nose, no difficulty swallowing  Cardiac:  No chest pain, edema. No chest pain with exertion. + SOB  Respiratory:  No cough or respiratory distress. No hemoptysis. + history of asthma, cough  GI:  No nausea, vomiting, diarrhea or abdominal pain.  :  No dysuria, frequency or burning.  MS:  No myalgia, muscle weakness, joint pain or back pain.  Neuro:  No headache or weakness.  No LOC.  Skin:  No skin rash.   Endocrine: + history of diabetes.

## 2020-04-13 NOTE — H&P ADULT - NSHPLABSRESULTS_GEN_ALL_CORE
13.0   4.55  )-----------( 121      ( 13 Apr 2020 09:37 )             38.4       04-13    130<L>  |  94<L>  |  43<H>  ----------------------------<  155<H>  4.4   |  18  |  1.1    Ca    8.8      13 Apr 2020 09:37    TPro  6.7  /  Alb  3.8  /  TBili  0.4  /  DBili  x   /  AST  38  /  ALT  15  /  AlkPhos  41  04-13      LIVER FUNCTIONS - ( 13 Apr 2020 09:37 )  Alb: 3.8 g/dL / Pro: 6.7 g/dL / ALK PHOS: 41 U/L / ALT: 15 U/L / AST: 38 U/L / GGT: x           CARDIAC MARKERS ( 13 Apr 2020 09:37 )  x     / 0.03 ng/mL / x     / x     / x        < from: Xray Chest 1 View-PORTABLE IMMEDIATE (04.13.20 @ 11:22) >      Bilateral, right greater than left opacities without pneumothorax. Findings are consistent with viral pneumonia in appropriate clinical setting    < end of copied text >

## 2020-04-13 NOTE — H&P ADULT - NSHPSOCIALHISTORY_GEN_ALL_CORE
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

## 2020-04-13 NOTE — ED ADULT TRIAGE NOTE - CHIEF COMPLAINT QUOTE
fever, sob x1 week when ems arrived pt sat was 80% on 6 litets, pt in triage was 86-88% on NRB 15 liters

## 2020-04-13 NOTE — H&P ADULT - NSICDXPASTMEDICALHX_GEN_ALL_CORE_FT
PAST MEDICAL HISTORY:  Anxiety     Depression     Diabetes mellitus     Dyslipidemia     Hypertension

## 2020-04-13 NOTE — ED PROVIDER NOTE - PHYSICAL EXAMINATION
CONSTITUTIONAL: In mild respiratory distress.  SKIN: warm, dry  HEAD: Normocephalic; atraumatic.  EYES: PERRL, EOMI, no conjunctival erythema  ENT: No nasal discharge; airway clear.  NECK: Supple; non tender.  CARD: S1, S2 normal; no murmurs, gallops, or rubs. Tachycardic.   RESP: Increase work of breathing. Tachypneic. Coarse breath sounds with expiratory wheeze diffusely.   ABD: soft ntnd  EXT: Normal ROM.  No clubbing, cyanosis or edema.   LYMPH: No acute cervical adenopathy.  NEURO: Alert, oriented, grossly unremarkable  PSYCH: Cooperative, appropriate.

## 2020-04-13 NOTE — ED PROVIDER NOTE - ATTENDING CONTRIBUTION TO CARE
70 F to ED with SOB  h/o asthma, CAD, HTN with worsening sob x 1week  +cough congestion sx x 1week  pt with worsening hypoxia will admit to ICU

## 2020-04-13 NOTE — H&P ADULT - ATTENDING COMMENTS
patient seen and examined agree with above, AHRF/ highly COVID/ start plaquenil/ steroids, inflammatory markers, very poor prognosis

## 2020-04-13 NOTE — H&P ADULT - NSHPPHYSICALEXAM_GEN_ALL_CORE
Gen- morbidly obese, looks uncomfortable on NRB, struggling to breathe  Head- atraumatic, normocephalic  ENT- normal oropharynx  Cardio- normal S1, S2  Pulm- diffuse wheezing appreciated throughout all lung fields  Abd- nontender, nondistended, obese abdomen  Ext- no cyanosis or edema  Neuro- AOx4  Psych- cooperative, appropriate ICU Vital Signs Last 24 Hrs  T(C): 36.7 (13 Apr 2020 09:26), Max: 36.7 (13 Apr 2020 09:26)  T(F): 98 (13 Apr 2020 09:26), Max: 98 (13 Apr 2020 09:26)  HR: 120 (13 Apr 2020 12:25) (115 - 120)  BP: 153/89 (13 Apr 2020 09:26) (153/89 - 153/89)  RR: 28 (13 Apr 2020 12:25) (25 - 28)  SpO2: 90% (13 Apr 2020 12:25) (86% - 91%)    Gen- morbidly obese, looks uncomfortable on NRB, struggling to breathe  Head- atraumatic, normocephalic  ENT- normal oropharynx  Cardio- normal S1, S2  Pulm- diffuse wheezing appreciated throughout all lung fields  Abd- nontender, nondistended, obese abdomen  Ext- no cyanosis or edema  Neuro- AOx4  Psych- cooperative, appropriate

## 2020-04-13 NOTE — H&P ADULT - ASSESSMENT
70y F w/ PMH of asthma (never hospitalized, never intubated), CAD s/p stent (07/2018), HTN, HLD, and DM (diet-controlled) presents from [facility] w/ the chief complaint of shortness of breath, fevers, chills for 1 week and has been progressively worsening.    Impression   -r/o COVID  -Suspected GNR PNA  -Asthma Exacerbation    Plan     1.CNS  -propofol/ morphine    2. CVS  -avoid volume overload  -2D echo ordered  -Trop-0.03, trending CE    3. PULMONARY   -Asthma Exacerbation  -on NRB at 15 LPM in respiratory distress, tachypneic, satting low 80's, will intubate  -titrate O2>92%  -HOB>30 degrees  -solumedrol 60 q12h started    4. INFECTIOUS DISEASE   -r/o COVID, Suspected GNR PNA, from NH  -vanc/ merrem started  -BCx, UCx, Sputum Cx ordered  -f/u COVID swab  -ID c/s placed  -HCQ started  -solumedrol started    5. GI  -GI ppx, protonix    6. RENAL  -replete electrolytes PRN    7. Endocrine   -f/u FS, target FS <180    8. Hematology   -cbc q24h, transfuse if Hgb<7    9. DVT PROPHYLAXIS  -D-dimer of 2411, will start therapeutic anticoagulation, will start Hep Drip  -f/u PTT, target 50-70    10. Dispo  -from [facility]  -ambulates w/ wheelchair at baseline 2/2 bad hip pain    11. Code Status   -full code 70y F w/ PMH of asthma (never hospitalized, never intubated), CAD s/p stent (07/2018), HTN, HLD, and DM (diet-controlled) presents from [facility] w/ the chief complaint of shortness of breath, fevers, chills for 1 week and has been progressively worsening.    Impression   -r/o COVID  -Suspected GNR PNA  -Asthma Exacerbation    Plan     1.CNS  - when intubated, proprofol    2. CVS  -avoid volume overload  -2D echo ordered  -Trop-0.03, trending CE    3. PULMONARY   -Asthma Exacerbation  -on NRB at 15 LPM in respiratory distress, tachypneic, satting low 80'  -titrate O2>92%  -HOB>30 degrees  -solumedrol 60 q12h started    4. INFECTIOUS DISEASE   -r/o COVID, Suspected GNR PNA, from NH  -vanc/ merrem started if procal nl dc abx  -BCx, UCx, Sputum Cx ordered  -f/u COVID swab  -ID c/s placed  -HCQ started  -solumedrol started    5. GI  -GI ppx, protonix    6. RENAL  -replete electrolytes PRN    7. Endocrine   -f/u FS, target FS <180    8. Hematology   -cbc q24h, transfuse if Hgb<7    9. DVT PROPHYLAXIS  -D-dimer of 2411, will start therapeutic anticoagulation, will start Hep Drip  -f/u PTT, target 50-70    10. Dispo  -from [facility]  -ambulates w/ wheelchair at baseline 2/2 bad hip pain    11. Code Status   -full code 70y F w/ PMH of asthma (never hospitalized, never intubated), CAD s/p stent (07/2018), HTN, HLD, and DM (diet-controlled) presents from [facility] w/ the chief complaint of shortness of breath, fevers, chills for 1 week and has been progressively worsening.    Impression   -r/o COVID  -Suspected GNR PNA  -Asthma Exacerbation    Plan     1.CNS  - when intubated, proprofol    2. CVS  -avoid volume overload  -2D echo ordered  -Trop-0.03, trending CE    3. PULMONARY   -Asthma Exacerbation  -on NRB at 15 LPM in respiratory distress, tachypneic, satting low 80'  -titrate O2>92%  -HOB>30 degrees  -solumedrol 60 q12h started    4. INFECTIOUS DISEASE   -r/o COVID, Suspected GNR PNA, from NH  -vanc/ merrem started if procal nl dc abx  -BCx, UCx, Sputum Cx ordered  -f/u COVID swab  -ID c/s placed  -HCQ started  -solumedrol started  -inflammatory markers ordered    5. GI  -GI ppx, protonix  -once intubated, will start OG feeds    6. RENAL  -replete electrolytes PRN    7. Endocrine   -f/u FS, target FS <180    8. Hematology   -cbc q24h, transfuse if Hgb<7    9. DVT PROPHYLAXIS  -D-dimer of 2411, will start therapeutic anticoagulation, will start Hep Drip  -f/u PTT, target 50-70    10. Dispo  -from [facility]  -ambulates w/ wheelchair at baseline 2/2 bad hip pain    11. Code Status   -full code

## 2020-04-13 NOTE — ED PROVIDER NOTE - OBJECTIVE STATEMENT
70y F w/ PMH of asthma, CAD s/p stent, HTN, HLD, and DM presents with difficulty breathing for 1 week that became severely worse today. Has had associated intermittent fever, cough, nasal congestion, and sore throat. No alleviating/worsening symptoms. Denies headache, CP, abd pain, n/v/d, or numbness/tingling.

## 2020-04-13 NOTE — H&P ADULT - HISTORY OF PRESENT ILLNESS
70y F w/ PMH of asthma (never hospitalized, never intubated), CAD s/p stent (07/2018), HTN, HLD, and DM (diet-controlled) presents from [facility] w/ the chief complaint of shortness of breath, fevers, chills for 1 week and has been progressively worsening. Patient denies ever having these symptoms before. Today the symptoms got much worse and was associated with wheezing as well so she came in to ED via EMS to be evaluated. No alleviating/worsening symptoms. Denies headache, CP, abd pain, n/v/d, or numbness/tingling.    According to nursing home records, patients has had intermittent fevers since 04/06/2020 and has been getting IV antibiotics, azithromycin and merrem.    In ED- , RR 25, T-98F, patient was hypoxic to 70's on RA and was placed on NRB 15 LPM    PMD- Dr. Levy  Cardio- Dr. Benjamin 70y F w/ PMH of asthma (never hospitalized, never intubated), CAD s/p stent (07/2018), HTN, HLD, and DM (diet-controlled) presents from [facility] w/ the chief complaint of shortness of breath, fevers, chills for 1 week and has been progressively worsening. Patient denies ever having these symptoms before. Today the symptoms got much worse and was associated with wheezing as well so she came in to ED via EMS to be evaluated. No alleviating/worsening symptoms. Denies headache, CP, abd pain, n/v/d, or numbness/tingling.    According to nursing home records, patients has had intermittent fevers since 04/06/2020 and has been getting IV antibiotics, azithromycin and merrem.    In ED- , RR 25, T-98F, patient was hypoxic to 70's on RA and was placed on NRB 15 LPM called for MICU    PMD- Dr. Levy  Cardio- Dr. Benjamin

## 2020-04-14 NOTE — DIETITIAN INITIAL EVALUATION ADULT. - ADD RECOMMEND
Change feeds to Vital HP @ 300ml q6hr (provides 1185 kcals, 104 gms protein, 1008 ml free water) <- does not include 370 kcals from propofol. Flushes per LIP

## 2020-04-14 NOTE — CONSULT NOTE ADULT - SUBJECTIVE AND OBJECTIVE BOX
NEPHROLOGY CONSULTATION NOTE/  History obtained from chart     70y F w/ PMH of asthma (never hospitalized, never intubated), CAD s/p stent (07/2018), HTN, HLD, and DM (diet-controlled) presents from [facility] w/ the chief complaint of shortness of breath, fevers, chills for 1 week and has been progressively worsening. Patient denies ever having these symptoms before.  Symptoms got much worse and was associated with wheezing as well so she came in to ED via EMS to be evaluated. No alleviating/worsening symptoms. Denies headache, CP, abd pain, n/v/d, or numbness/tingling.  Patient went into respiratory distress was intubated and started on MV   Renal called for BRIDGETTE / at bedside on MV / no rash / no diarrhea/ sims in       PAST MEDICAL & SURGICAL HISTORY:  Depression  Anxiety  Dyslipidemia  Diabetes mellitus  Hypertension    Allergies:  No Known Allergies    Home Medications Reviewed  Hospital Medications:   MEDICATIONS  (STANDING):  aspirin  chewable 81 milliGRAM(s) Oral daily  chlorhexidine 4% Liquid 1 Application(s) Topical <User Schedule>  clopidogrel Tablet 75 milliGRAM(s) Oral daily  dextrose 5% 1000 milliLiter(s) (100 mL/Hr) IV Continuous <Continuous>  dextrose 5%. 1000 milliLiter(s) (50 mL/Hr) IV Continuous <Continuous>  folic acid 1 milliGRAM(s) Oral daily  heparin  Injectable 5000 Unit(s) SubCutaneous every 8 hours  hydroxychloroquine 400 milliGRAM(s) Oral every 24 hours  insulin glargine Injectable (LANTUS) 18 Unit(s) SubCutaneous at bedtime  insulin lispro (HumaLOG) corrective regimen sliding scale   SubCutaneous three times a day before meals  insulin lispro Injectable (HumaLOG) 6 Unit(s) SubCutaneous every 6 hours  meropenem  IVPB 1000 milliGRAM(s) IV Intermittent every 12 hours  methylPREDNISolone sodium succinate Injectable 60 milliGRAM(s) IV Push every 12 hours  metoprolol tartrate 50 milliGRAM(s) Oral two times a day  morphine  Infusion. 2 mG/Hr (2 mL/Hr) IV Continuous <Continuous>  norepinephrine Infusion 0.05 MICROgram(s)/kG/Min (5.53 mL/Hr) IV Continuous <Continuous>  pantoprazole   Suspension 40 milliGRAM(s) Oral daily  propofol Infusion 20.056 MICROgram(s)/kG/Min (14.2 mL/Hr) IV Continuous <Continuous>      SOCIAL HISTORY:  Denies ETOH,Smoking,   FAMILY HISTORY:        REVIEW OF SYSTEMS:  All other review of systems is negative unless indicated above.    VITALS:  T(F): 95.3 (04-14-20 @ 07:45), Max: 98.8 (04-14-20 @ 04:00)  HR: 53 (04-14-20 @ 07:45)  BP: 132/60 (04-14-20 @ 07:45)  RR: 30 (04-14-20 @ 07:45)  SpO2: 100% (04-14-20 @ 07:45)    04-13 @ 07:01  -  04-14 @ 07:00  --------------------------------------------------------  IN: 417 mL / OUT: 0 mL / NET: 417 mL      Height (cm): 164 (04-14 @ 00:17)  Weight (kg): 118 (04-13 @ 14:41)  BMI (kg/m2): 43.9 (04-14 @ 00:17)  BSA (m2): 2.2 (04-14 @ 00:17)      I&O's Detail    13 Apr 2020 07:01  -  14 Apr 2020 07:00  --------------------------------------------------------  IN:    heparin  Infusion.: 210 mL    morphine  Infusion.: 39 mL    propofol Infusion: 168 mL  Total IN: 417 mL    OUT:  Total OUT: 0 mL    Total NET: 417 mL            PHYSICAL EXAM:  Constitutional: intubated on MV   HEENT: anicteric sclera, oropharynx clear, MMM  Neck: No JVD/ positive ETT   Respiratory: CTAB, no wheezes, rales or rhonchi  Cardiovascular: S1, S2, RRR  Gastrointestinal: BS+, soft, NT/ND  Extremities: No cyanosis or clubbing. No peripheral edema  : No CVA tenderness. No sims.   Skin: No rashes  Vascular Access:    LABS:  04-14    136  |  97<L>  |  57<H>  ----------------------------<  222<H>  3.8   |  15<L>  |  2.0<H>    Ca    9.0      14 Apr 2020 05:30  Mg     3.0     04-14    TPro  6.9  /  Alb  3.4<L>  /  TBili  0.4  /  DBili      /  AST  27  /  ALT  13  /  AlkPhos  39  04-14    Creatinine Trend: 2.0 <--, 1.1 <--                        12.1   9.80  )-----------( 177      ( 14 Apr 2020 05:30 )             36.2     Urine Studies:    Blood Gas Profile - Arterial (04.14.20 @ 08:20)    pH, Arterial: 7.27    pCO2, Arterial: 40 mmHg    pO2, Arterial: 232 mmHg    HCO3, Arterial: 18 mmoL/L    Base Excess, Arterial: -8.1 mmoL/L    Oxygen Saturation, Arterial: 99 %              RADIOLOGY & ADDITIONAL STUDIES:  < from: Xray Chest 1 View AP/PA (04.14.20 @ 06:21) >  IMPRESSION:     Unchanged retrocardiac consolidation and bilateral patchy opacities.    < end of copied text >

## 2020-04-14 NOTE — DIETITIAN INITIAL EVALUATION ADULT. - ENERGY NEEDS
Calories: 1300 - 1500 kcals/day   1272 - 1485 kcals (60-70% [id number]) vs. 1250 - 1420 kcals (22-25 kcal/kg IBW) vs. 1298 - 1652 kcals (11-14 kcal/kg ABW)  Protein: 114 - 125 gms/day (2.0 - 2.2 gm/kg IBW)  Fluid: per team

## 2020-04-14 NOTE — CHART NOTE - NSCHARTNOTEFT_GEN_A_CORE
Spoke with son Adolfo [phone], who is to be the emergency contact per son and pt's sister (who was also updated.  Reviewed pt's critical condition and intubated on ventilator status.  Answered questions, addressed concerns.

## 2020-04-14 NOTE — CHART NOTE - NSCHARTNOTEFT_GEN_A_CORE
At ~ 3:30Am was contacted by lab re: pt PTT >200.    Plan:  - Heparin drip held at 3:30AM  - Draw PTT at 5:30AM. Resume heparin drip if appropriate At ~ 4:45Am was contacted by lab re: pt PTT >200.    Plan:  - Heparin drip held at 4:45AM  - Draw PTT at 6:45AM. Resume heparin drip if appropriate

## 2020-04-14 NOTE — CONSULT NOTE ADULT - ASSESSMENT
70y F w/ PMH of asthma (never hospitalized, never intubated), CAD s/p stent (07/2018), HTN, HLD, and DM (diet-controlled) presenting for respiratory distress sp intubation on MV now with BRIDGETTE  ·	BRIDGETTE rule out ATN in the context of highly suspicious covid 19 infection/ cytokine storm/ Acidosis/ hyperkalemia/ HAGMA and respiratory acidosis  ·	suggest D5w with 3 amsp bicarb at 100 cc per hour  ·	strict I and O  ·	if oliguric trial of lasix 80 mg IV tonight   ·	UA and urine lytes   ·	check lactic acid   ·	keep MAP>65   ·	Medications dosage ok   ·	no need for RRT    will follow closely   d/w medical team

## 2020-04-14 NOTE — PROGRESS NOTE ADULT - ASSESSMENT
70y F w/ PMH of asthma (never hospitalized, never intubated), CAD s/p stent (07/2018), HTN, HLD, and DM (diet-controlled) presents from [facility] w/ the chief complaint of shortness of breath, fevers, chills for 1 week and has been progressively worsening.    Impression   ARF hypoxic   -r/o COVID  -Suspected GNR PNA  -Asthma Exacerbation  ARDS  BRIDGETTE    Plan     1.CNS  - when intubated, proprofol    2. CVS  start Bicarb drip   -2D echo ordered  -Trop-0.03, trending CE    3. PULMONARY   -Asthma Exacerbation  change setting to 30/15 lower fio2 to 60 s  -solumedrol 60 q12h started    4. INFECTIOUS DISEASE   -r/o COVID, Suspected GNR PNA, from NH  -vanc/ merrem started if procal nl dc abx  -BCx, UCx, Sputum Cx ordered  -f/u COVID swab  -ID c/s placed  -HCQ started  -solumedrol started  -inflammatory markers ordered  Id consult   nasal MRSA     5. GI  -GI ppx, protonix  -once intubated, will start OG feeds    6. RENAL  -replete electrolytes PRN    7. Endocrine   -f/u FS, target FS <180    8. Hematology   -cbc q24h, transfuse if Hgb<7    9. DVT PROPHYLAXIS   heparin Sq Q 8 hrs 5000    10. Dispo  -from [facility]  -ambulates w/ wheelchair at baseline 2/2 bad hip pain    11. Code Status   -full code

## 2020-04-14 NOTE — CONSULT NOTE ADULT - ASSESSMENT
· Assessment		  70y F w/ PMH of asthma (never hospitalized, never intubated), CAD s/p stent (07/2018), HTN, HLD, and DM (diet-controlled) presents from [facility] w/ the chief complaint of shortness of breath, fevers, chills for 1 week and has been progressively worsening.    IMPRESSION;   COVID19 with septic shock requiring pressors, resp failure, mechanical ventilation with ARDS with FiO2 100%, secondary to Cytokine Release Syndrome  -inflammatory markers pending   -elevated Ddimer is a poor prognostic indicator and differentiate survivors from non survivors  -procalcitonin of 0.65 suggestive of a bacterial PNA/infection   -has been on azithro/ merem in NH since 4/6    RECOMMENDATIONS;  BCx  nares ORSA  sputa cultures if possible  f/u COVID-19     PLAQUENIL 800 mg PO q24h x one dose, then 400mg x once per day for 4 more days.  -if QTc<500ms then start HCQ. No monitoring or serial EKGs required.  -if QTC>500ms then monitor EKG. Use MCOT. If tele/MCOT no serial EKGs  -d/c for QTc>550  -maintain K levels>4mEq/L, Mg>2mg/dl   Meropenem 1 gm iv q12h  poor prognosis  ferritin, CRP  Consider Lovenox .  -indication: ddimer>1000.   -exclusion: active bleeding, H/o HIT, recent hemorrhagic stroke

## 2020-04-14 NOTE — DIETITIAN INITIAL EVALUATION ADULT. - FACTORS AFF FOOD INTAKE
Intubated/sedated on propfol. 1 pressor. TF ordered last night. Unable to obtain nutrition hx at this time.

## 2020-04-14 NOTE — DIETITIAN INITIAL EVALUATION ADULT. - PERTINENT MEDS FT
heparin, plavix, abx, humalog, levophed, folic acid, solumedrol, morphine, protonix, propofol @14ml/hr =370 kcals/day

## 2020-04-14 NOTE — PROGRESS NOTE ADULT - ASSESSMENT
70y F w/ PMH of asthma (never hospitalized, never intubated), CAD s/p stent (07/2018), HTN, HLD, and DM (diet-controlled) presents from [facility] w/ the chief complaint of shortness of breath, fevers, chills for 1 week and has been progressively worsening.      # Hypoxic respiratory failure  - Intubated, PCV I30/E15/R30/100%   - Etiology may include:     > Given pandemic > COVID - 19       + Pending COVID-19 PCR      + On HCQ hoping it would help      + Solumedrol 60mg IV q12h    > Bacterial LRTI - GN? Came from nursing home      + On meropenem and vancomycin (dose adjusted according to clerance)    > CXR shows enlarged cardiac silhouette > Decompensation of heart failure / fluid overload      + Pending cardiac ultrasound      + Avoid fluid overload  - Pending ID recommendations    # BRIDGETTE   - Not known CKD, creatinine normal on admission   - Non-oliguric for now  - ATN? Drug induced?  - DC vancomycin, pending vanco levels  - Adjusted medication  - Pending urine studies    # Ischemic heart disease, HFrEF?  - On DAPT  - Metoprolol 50mg bid  - No ACEi/ARB > keep off for now  - No lasix for now     # DVT prophylaxis > On heparin subQ

## 2020-04-14 NOTE — CONSULT NOTE ADULT - SUBJECTIVE AND OBJECTIVE BOX
NIRMAL DANIELS  70y, Female  Allergy: No Known Allergies      All historical available data reviewed.    HPI:  70y F w/ PMH of asthma (never hospitalized, never intubated), CAD s/p stent (07/2018), HTN, HLD, and DM (diet-controlled) presents from [facility] w/ the chief complaint of shortness of breath, fevers, chills for 1 week and has been progressively worsening. Patient denies ever having these symptoms before. Today the symptoms got much worse and was associated with wheezing as well so she came in to ED via EMS to be evaluated. No alleviating/worsening symptoms. Denies headache, CP, abd pain, n/v/d, or numbness/tingling.    According to nursing home records, patients has had intermittent fevers since 04/06/2020 and has been getting IV antibiotics, azithromycin and merrem.    In ED- , RR 25, T-98F, patient was hypoxic to 70's on RA and was placed on NRB 15 LPM called for MICU    PMD- Dr. Levy  Cardio- Dr. Benjamin (13 Apr 2020 14:01)    FAMILY HISTORY:    PAST MEDICAL & SURGICAL HISTORY:  Depression  Anxiety  Dyslipidemia  Diabetes mellitus  Hypertension        VITALS:  T(F): 95.3, Max: 98.8 (04-14-20 @ 04:00)  HR: 53  BP: 132/60  RR: 30Vital Signs Last 24 Hrs  T(C): 35.2 (14 Apr 2020 07:45), Max: 37.1 (14 Apr 2020 04:00)  T(F): 95.3 (14 Apr 2020 07:45), Max: 98.8 (14 Apr 2020 04:00)  HR: 53 (14 Apr 2020 07:45) (53 - 110)  BP: 132/60 (14 Apr 2020 07:45) (95/63 - 132/60)  BP(mean): 86 (14 Apr 2020 04:00) (70 - 86)  RR: 30 (14 Apr 2020 07:45) (30 - 30)  SpO2: 100% (14 Apr 2020 07:45) (98% - 100%)    TESTS & MEASUREMENTS:                        12.1   9.80  )-----------( 177      ( 14 Apr 2020 05:30 )             36.2     04-14    136  |  97<L>  |  57<H>  ----------------------------<  222<H>  3.8   |  15<L>  |  2.0<H>    Ca    9.0      14 Apr 2020 05:30  Mg     3.0     04-14    TPro  6.9  /  Alb  3.4<L>  /  TBili  0.4  /  DBili  x   /  AST  27  /  ALT  13  /  AlkPhos  39  04-14    LIVER FUNCTIONS - ( 14 Apr 2020 05:30 )  Alb: 3.4 g/dL / Pro: 6.9 g/dL / ALK PHOS: 39 U/L / ALT: 13 U/L / AST: 27 U/L / GGT: x                   RADIOLOGY & ADDITIONAL TESTS:  Personal review of radiological diagnostics performed  Echo and EKG results noted when applicable.     MEDICATIONS:  aspirin  chewable 81 milliGRAM(s) Oral daily  chlorhexidine 4% Liquid 1 Application(s) Topical <User Schedule>  clopidogrel Tablet 75 milliGRAM(s) Oral daily  dextrose 40% Gel 15 Gram(s) Oral once PRN  dextrose 5% 1000 milliLiter(s) IV Continuous <Continuous>  dextrose 5%. 1000 milliLiter(s) IV Continuous <Continuous>  dextrose 50% Injectable 12.5 Gram(s) IV Push once  dextrose 50% Injectable 25 Gram(s) IV Push once  dextrose 50% Injectable 25 Gram(s) IV Push once  folic acid 1 milliGRAM(s) Oral daily  glucagon  Injectable 1 milliGRAM(s) IntraMuscular once PRN  heparin  Injectable 5000 Unit(s) SubCutaneous every 8 hours  hydroxychloroquine 400 milliGRAM(s) Oral every 24 hours  hydroxychloroquine   Oral   insulin glargine Injectable (LANTUS) 18 Unit(s) SubCutaneous at bedtime  insulin lispro (HumaLOG) corrective regimen sliding scale   SubCutaneous three times a day before meals  insulin lispro Injectable (HumaLOG) 6 Unit(s) SubCutaneous every 6 hours  meropenem  IVPB 1000 milliGRAM(s) IV Intermittent every 12 hours  methylPREDNISolone sodium succinate Injectable 60 milliGRAM(s) IV Push every 12 hours  metoprolol tartrate 50 milliGRAM(s) Oral two times a day  morphine  Infusion. 2 mG/Hr IV Continuous <Continuous>  norepinephrine Infusion 0.05 MICROgram(s)/kG/Min IV Continuous <Continuous>  pantoprazole   Suspension 40 milliGRAM(s) Oral daily  propofol Infusion 20.056 MICROgram(s)/kG/Min IV Continuous <Continuous>      ANTIBIOTICS:  hydroxychloroquine 400 milliGRAM(s) Oral every 24 hours  hydroxychloroquine   Oral   meropenem  IVPB 1000 milliGRAM(s) IV Intermittent every 12 hours

## 2020-04-14 NOTE — PROGRESS NOTE ADULT - SUBJECTIVE AND OBJECTIVE BOX
Patient is a 70y old  Female who presents with a chief complaint of shortness of breath (13 Apr 2020 14:01)      Over Night Events:  Patient seen and examined s/p intubation on sedation       ROS:  See HPI    PHYSICAL EXAM    ICU Vital Signs Last 24 Hrs  T(C): 35.2 (14 Apr 2020 07:45), Max: 37.1 (14 Apr 2020 04:00)  T(F): 95.3 (14 Apr 2020 07:45), Max: 98.8 (14 Apr 2020 04:00)  HR: 53 (14 Apr 2020 07:45) (53 - 121)  BP: 132/60 (14 Apr 2020 07:45) (95/63 - 196/91)  BP(mean): 86 (14 Apr 2020 04:00) (70 - 86)  ABP: --  ABP(mean): --  RR: 30 (14 Apr 2020 07:45) (28 - 30)  SpO2: 100% (14 Apr 2020 07:45) (87% - 100%)      General:on sedation   HEENT:        et tube         Lymph Nodes: NO cervical LN   Lungs: Bilateral BS  Cardiovascular: Regular   Abdomen: Soft, Positive BS  Extremities: No clubbing   Skin: warm   Neurological: no focal deficit   Musculoskeletal: move all ext     I&O's Detail    13 Apr 2020 07:01  -  14 Apr 2020 07:00  --------------------------------------------------------  IN:    heparin  Infusion.: 210 mL    morphine  Infusion.: 39 mL    propofol Infusion: 168 mL  Total IN: 417 mL    OUT:  Total OUT: 0 mL    Total NET: 417 mL          LABS:                          12.1   9.80  )-----------( 177      ( 14 Apr 2020 05:30 )             36.2         14 Apr 2020 05:30    136    |  97     |  57     ----------------------------<  222    3.8     |  15     |  2.0      Ca    9.0        14 Apr 2020 05:30  Mg     3.0       14 Apr 2020 05:30    TPro  6.9    /  Alb  3.4    /  TBili  0.4    /  DBili  x      /  AST  27     /  ALT  13     /  AlkPhos  39     14 Apr 2020 05:30  Amylase x     lipase x                                                 PTT - ( 14 Apr 2020 05:30 )  PTT:>200.0 sec                                           Lactate, Blood: 1.1 mmol/L (04-14-20 @ 05:30)    CARDIAC MARKERS ( 14 Apr 2020 05:30 )  x     / 0.06 ng/mL / x     / x     / x      CARDIAC MARKERS ( 13 Apr 2020 09:37 )  x     / 0.03 ng/mL / x     / x     / x                                                                                                                                             ABG - ( 14 Apr 2020 08:20 )  pH, Arterial: 7.27  pH, Blood: x     /  pCO2: 40    /  pO2: 232   / HCO3: 18    / Base Excess: -8.1  /  SaO2: 99                  MEDICATIONS  (STANDING):  aspirin  chewable 81 milliGRAM(s) Oral daily  chlorhexidine 4% Liquid 1 Application(s) Topical <User Schedule>  clopidogrel Tablet 75 milliGRAM(s) Oral daily  dextrose 5% 1000 milliLiter(s) (75 mL/Hr) IV Continuous <Continuous>  dextrose 5%. 1000 milliLiter(s) (50 mL/Hr) IV Continuous <Continuous>  dextrose 50% Injectable 12.5 Gram(s) IV Push once  dextrose 50% Injectable 25 Gram(s) IV Push once  dextrose 50% Injectable 25 Gram(s) IV Push once  folic acid 1 milliGRAM(s) Oral daily  heparin  Injectable 5000 Unit(s) SubCutaneous every 8 hours  hydroxychloroquine 400 milliGRAM(s) Oral every 24 hours  hydroxychloroquine   Oral   insulin glargine Injectable (LANTUS) 18 Unit(s) SubCutaneous at bedtime  insulin lispro (HumaLOG) corrective regimen sliding scale   SubCutaneous three times a day before meals  insulin lispro Injectable (HumaLOG) 6 Unit(s) SubCutaneous every 6 hours  meropenem  IVPB 1000 milliGRAM(s) IV Intermittent every 12 hours  methylPREDNISolone sodium succinate Injectable 60 milliGRAM(s) IV Push every 12 hours  metoprolol tartrate 50 milliGRAM(s) Oral two times a day  morphine  Infusion. 2 mG/Hr (2 mL/Hr) IV Continuous <Continuous>  norepinephrine Infusion 0.05 MICROgram(s)/kG/Min (5.53 mL/Hr) IV Continuous <Continuous>  pantoprazole   Suspension 40 milliGRAM(s) Oral daily  propofol Infusion 20.056 MICROgram(s)/kG/Min (14.2 mL/Hr) IV Continuous <Continuous>    MEDICATIONS  (PRN):  dextrose 40% Gel 15 Gram(s) Oral once PRN Blood Glucose LESS THAN 70 milliGRAM(s)/deciliter  glucagon  Injectable 1 milliGRAM(s) IntraMuscular once PRN Glucose LESS THAN 70 milligrams/deciliter          Xrays:  TLC: going straight did blood gas it is venous   OG:  ET tube:                                                                                    b/l opacity    ECHO:  [facility] ICU:

## 2020-04-14 NOTE — PROGRESS NOTE ADULT - SUBJECTIVE AND OBJECTIVE BOX
Subjective:  The patient is sedated, intubated, ventilated.     Objective:      aspirin  chewable 81 milliGRAM(s) Oral daily  chlorhexidine 4% Liquid 1 Application(s) Topical <User Schedule>  clopidogrel Tablet 75 milliGRAM(s) Oral daily  dextrose 40% Gel 15 Gram(s) Oral once PRN  dextrose 5% 1000 milliLiter(s) IV Continuous <Continuous>  dextrose 5%. 1000 milliLiter(s) IV Continuous <Continuous>  dextrose 50% Injectable 12.5 Gram(s) IV Push once  dextrose 50% Injectable 25 Gram(s) IV Push once  dextrose 50% Injectable 25 Gram(s) IV Push once  folic acid 1 milliGRAM(s) Oral daily  glucagon  Injectable 1 milliGRAM(s) IntraMuscular once PRN  heparin  Injectable 5000 Unit(s) SubCutaneous every 8 hours  hydroxychloroquine 400 milliGRAM(s) Oral every 24 hours  hydroxychloroquine   Oral   insulin glargine Injectable (LANTUS) 18 Unit(s) SubCutaneous at bedtime  insulin lispro (HumaLOG) corrective regimen sliding scale   SubCutaneous three times a day before meals  insulin lispro Injectable (HumaLOG) 6 Unit(s) SubCutaneous every 6 hours  meropenem  IVPB 1000 milliGRAM(s) IV Intermittent every 12 hours  methylPREDNISolone sodium succinate Injectable 60 milliGRAM(s) IV Push every 12 hours  metoprolol tartrate 50 milliGRAM(s) Oral two times a day  morphine  Infusion. 2 mG/Hr IV Continuous <Continuous>  norepinephrine Infusion 0.05 MICROgram(s)/kG/Min IV Continuous <Continuous>  pantoprazole   Suspension 40 milliGRAM(s) Oral daily  propofol Infusion 20.056 MICROgram(s)/kG/Min IV Continuous <Continuous>    PHYSICAL EXAM:  General: Sedated, intubated, ventilated  Cardiac: S1 S2 heard regular  Pulmonary: Audible bilateral crackles  Abdomen: Soft, +BS     T(C): 35.2 (04-14-20 @ 07:45), Max: 37.1 (04-14-20 @ 04:00)  HR: 53 (04-14-20 @ 07:45) (53 - 121)  BP: 132/60 (04-14-20 @ 07:45) (95/63 - 196/91)  RR: 30 (04-14-20 @ 07:45) (28 - 30)  SpO2: 100% (04-14-20 @ 07:45) (87% - 100%)    LABS:                        12.1   9.80  )-----------( 177      ( 14 Apr 2020 05:30 )             36.2     04-14    136  |  97<L>  |  57<H>  ----------------------------<  222<H>  3.8   |  15<L>  |  2.0<H>    Ca    9.0      14 Apr 2020 05:30  Mg     3.0     04-14    TPro  6.9  /  Alb  3.4<L>  /  TBili  0.4  /  DBili  x   /  AST  27  /  ALT  13  /  AlkPhos  39  04-14    PTT - ( 14 Apr 2020 05:30 )  PTT:>200.0 sec  Alanine Aminotransferase (ALT/SGPT): 13 U/L (04-14-20 @ 05:30)  Aspartate Aminotransferase (AST/SGOT): 27 U/L (04-14-20 @ 05:30)

## 2020-04-15 NOTE — PROGRESS NOTE ADULT - SUBJECTIVE AND OBJECTIVE BOX
intubated/ventilated        PAST HISTORY  --------------------------------------------------------------------------------  No significant changes to PMH, PSH, FHx, SHx, unless otherwise noted    ALLERGIES & MEDICATIONS  --------------------------------------------------------------------------------  Allergies    No Known Allergies    Intolerances      Standing Inpatient Medications  aspirin  chewable 81 milliGRAM(s) Oral daily  chlorhexidine 4% Liquid 1 Application(s) Topical <User Schedule>  clopidogrel Tablet 75 milliGRAM(s) Oral daily  dextrose 5% 1000 milliLiter(s) IV Continuous <Continuous>  dextrose 5%. 1000 milliLiter(s) IV Continuous <Continuous>  dextrose 50% Injectable 12.5 Gram(s) IV Push once  dextrose 50% Injectable 25 Gram(s) IV Push once  dextrose 50% Injectable 25 Gram(s) IV Push once  folic acid 1 milliGRAM(s) Oral daily  heparin  Injectable 7500 Unit(s) SubCutaneous every 8 hours  hydroxychloroquine 400 milliGRAM(s) Oral every 24 hours  hydroxychloroquine   Oral   insulin glargine Injectable (LANTUS) 18 Unit(s) SubCutaneous at bedtime  insulin lispro (HumaLOG) corrective regimen sliding scale   SubCutaneous three times a day before meals  insulin lispro Injectable (HumaLOG) 6 Unit(s) SubCutaneous every 6 hours  meropenem  IVPB 1000 milliGRAM(s) IV Intermittent every 12 hours  methylPREDNISolone sodium succinate Injectable 60 milliGRAM(s) IV Push every 12 hours  metoprolol tartrate 50 milliGRAM(s) Oral two times a day  morphine  Infusion. 2 mG/Hr IV Continuous <Continuous>  norepinephrine Infusion 0.05 MICROgram(s)/kG/Min IV Continuous <Continuous>  pantoprazole   Suspension 40 milliGRAM(s) Oral daily  propofol Infusion 20.056 MICROgram(s)/kG/Min IV Continuous <Continuous>    PRN Inpatient Medications  dextrose 40% Gel 15 Gram(s) Oral once PRN  glucagon  Injectable 1 milliGRAM(s) IntraMuscular once PRN          VITALS/PHYSICAL EXAM  --------------------------------------------------------------------------------  T(C): 36.1 (04-15-20 @ 04:00), Max: 36.1 (04-15-20 @ 04:00)  HR: 40 (04-15-20 @ 04:30) (40 - 64)  BP: 100/56 (04-15-20 @ 04:00) (98/47 - 123/59)  RR: 30 (04-15-20 @ 04:00) (30 - 30)  SpO2: 100% (04-15-20 @ 04:00) (100% - 100%)  Wt(kg): --  Height (cm): 164 (04-14-20 @ 00:17)  Weight (kg): 118 (04-13-20 @ 14:41)  BMI (kg/m2): 43.9 (04-14-20 @ 00:17)  BSA (m2): 2.2 (04-14-20 @ 00:17)      04-14-20 @ 07:01  -  04-15-20 @ 07:00  --------------------------------------------------------  IN: 0 mL / OUT: 180 mL / NET: -180 mL      Physical Exam:  	Gen: intubated/ventilated   LABS/STUDIES  --------------------------------------------------------------------------------              10.3   10.73 >-----------<  146      [04-15-20 @ 04:30]              30.2     132  |  93  |  68  ----------------------------<  242      [04-15-20 @ 04:30]  3.9   |  22  |  2.5        Ca     8.8     [04-15-20 @ 04:30]      Mg     3.0     [04-14-20 @ 05:30]    TPro  5.7  /  Alb  2.9  /  TBili  0.4  /  DBili  x   /  AST  19  /  ALT  11  /  AlkPhos  43  [04-15-20 @ 04:30]      PTT: >200.0      [04-14-20 @ 05:30]    Troponin 0.06      [04-14-20 @ 05:30]        [04-15-20 @ 04:30]    Creatinine Trend:  SCr 2.5 [04-15 @ 04:30]  SCr 2.0 [04-14 @ 05:30]  SCr 1.1 [04-13 @ 09:37]        Ferritin 1111      [04-14-20 @ 05:30]

## 2020-04-15 NOTE — PROGRESS NOTE ADULT - SUBJECTIVE AND OBJECTIVE BOX
Subjective:  The patient is sedated, intubated, ventilated.     Objective:      aspirin  chewable 81 milliGRAM(s) Oral daily  chlorhexidine 4% Liquid 1 Application(s) Topical <User Schedule>  clopidogrel Tablet 75 milliGRAM(s) Oral daily  dextrose 40% Gel 15 Gram(s) Oral once PRN  dextrose 5% 1000 milliLiter(s) IV Continuous <Continuous>  dextrose 5%. 1000 milliLiter(s) IV Continuous <Continuous>  dextrose 50% Injectable 12.5 Gram(s) IV Push once  dextrose 50% Injectable 25 Gram(s) IV Push once  dextrose 50% Injectable 25 Gram(s) IV Push once  folic acid 1 milliGRAM(s) Oral daily  furosemide   Injectable 80 milliGRAM(s) IV Push once  glucagon  Injectable 1 milliGRAM(s) IntraMuscular once PRN  heparin  Injectable 7500 Unit(s) SubCutaneous every 8 hours  hydroxychloroquine 400 milliGRAM(s) Oral every 24 hours  hydroxychloroquine   Oral   insulin glargine Injectable (LANTUS) 18 Unit(s) SubCutaneous at bedtime  insulin lispro (HumaLOG) corrective regimen sliding scale   SubCutaneous three times a day before meals  insulin lispro Injectable (HumaLOG) 6 Unit(s) SubCutaneous every 6 hours  meropenem  IVPB 1000 milliGRAM(s) IV Intermittent every 12 hours  methylPREDNISolone sodium succinate Injectable 60 milliGRAM(s) IV Push every 12 hours  metoprolol tartrate 50 milliGRAM(s) Oral two times a day  morphine  Infusion. 2 mG/Hr IV Continuous <Continuous>  norepinephrine Infusion 0.05 MICROgram(s)/kG/Min IV Continuous <Continuous>  pantoprazole   Suspension 40 milliGRAM(s) Oral daily  propofol Infusion 20.056 MICROgram(s)/kG/Min IV Continuous <Continuous>      PHYSICAL EXAM:  General: Sedated, intubated, ventilated  Cardiac: S1 S2 heard regular  Pulmonary: Audible bilateral crackles  Abdomen: Soft, +BS     T(C): 36.1 (04-15-20 @ 04:00), Max: 36.1 (04-15-20 @ 04:00)  HR: 40 (04-15-20 @ 04:30) (40 - 64)  BP: 100/56 (04-15-20 @ 04:00) (98/47 - 123/59)  RR: 30 (04-15-20 @ 04:00) (30 - 30)  SpO2: 100% (04-15-20 @ 04:00) (100% - 100%)    LABS:                        10.3   10.73 )-----------( 146      ( 15 Apr 2020 04:30 )             30.2     04-15    132<L>  |  93<L>  |  68<HH>  ----------------------------<  242<H>  3.9   |  22  |  2.5<H>    Ca    8.8      15 Apr 2020 04:30  Mg     3.0     04-14    TPro  5.7<L>  /  Alb  2.9<L>  /  TBili  0.4  /  DBili  x   /  AST  19  /  ALT  11  /  AlkPhos  43  04-15    PTT - ( 14 Apr 2020 05:30 )  PTT:>200.0 sec  Aspartate Aminotransferase (AST/SGOT): 19 U/L (04-15-20 @ 04:30)  Alanine Aminotransferase (ALT/SGPT): 11 U/L (04-15-20 @ 04:30)

## 2020-04-15 NOTE — PROGRESS NOTE ADULT - ASSESSMENT
70y F w/ PMH of asthma (never hospitalized, never intubated), CAD s/p stent (07/2018), HTN, HLD, and DM (diet-controlled) presents from [facility] w/ the chief complaint of shortness of breath, fevers, chills for 1 week and has been progressively worsening.      # Hypoxic respiratory failure  - Intubated, PCV I30/E15/R25/80%  - Etiology may include:     > Given pandemic > COVID - 19       + Pending COVID-19 PCR      + On HCQ hoping it would help      + Solumedrol 60mg IV q12h      + On Heparin 7500U q8h subQ    > Bacterial LRTI - GN? Came from nursing home      + On meropenem and vancomycin (dose adjusted according to cledwayne) ( Vancomycin level 20, on hold due to BRIDGETTE since 4/13)    > CXR shows enlarged cardiac silhouette > Decompensation of heart failure / fluid overload      + Pending cardiac ultrasound      + Avoid fluid overload    # BRIDGETTE   - Not known CKD, creatinine normal on admission   - Non-oliguric for now  - ATN? Drug induced?  - DC vancomycin on 4/13  - Pending urine studies    # Ischemic heart disease, HFrEF?  - On DAPT  - Metoprolol 50mg bid  - No ACEi/ARB > keep off for now  - No lasix for now     # DVT prophylaxis > On heparin subQ

## 2020-04-15 NOTE — PROGRESS NOTE ADULT - ASSESSMENT
70y F w/ PMH of asthma (never hospitalized, never intubated), CAD s/p stent (07/2018), HTN, HLD, and DM (diet-controlled) presents from [facility] w/ the chief complaint of shortness of breath, fevers, chills for 1 week and has been progressively worsening.    Impression   ARF hypoxic   -r/o COVID  -Suspected GNR PNA  -Asthma Exacerbation  ARDS  BRIDGETTE    Plan     1.CNS  sedation continue     2. CVS  d/c bicarb drip   -2D echo ordered  lasix 80 now     3. PULMONARY   -Asthma Exacerbation  change setting to 30/15 lower fio2 to 80 rate to 25   -solumedrol 60 q12h started    4. INFECTIOUS DISEASE    d/c vanco follow trouph   on HCQ and merop  follow ID for IV toci   follow ferritin ,crp     5. GI  -GI ppx, protonix  -once intubated, will start OG feeds    6. RENAL  -replete electrolytes PRN  follow renal   give lasix monitor Is and OS     7. Endocrine   -f/u FS, target FS <180    8. Hematology   -cbc q24h, transfuse if Hgb<7    9. DVT PROPHYLAXIS   heparin Sq Q 7500 hrs 5000    11. Code Status   -full code

## 2020-04-15 NOTE — PROGRESS NOTE ADULT - ASSESSMENT
70y F w/ PMH of asthma (never hospitalized, never intubated), CAD s/p stent (07/2018), HTN, HLD, and DM (diet-controlled) presenting for respiratory distress sp intubation on MV now with BRIDGETTE  BRIDGETTE rule out ATN in the context of highly suspicious covid 19 infection/ cytokine storm/ Acidosis/ hyperkalemia/ HAGMA and respiratory acidosis  # crearinine trending up  # oliguric  # give 1 dose of lasix 80  # decrease angeli to 500 q12   # check ph level  # no acute indication for RRT  # will follow

## 2020-04-15 NOTE — PROGRESS NOTE ADULT - SUBJECTIVE AND OBJECTIVE BOX
Patient is a 70y old  Female who presents with a chief complaint of shortness of breath (15 Apr 2020 09:20)      Over Night Events:  Patient seen and examined.still vented  oliguric       ROS:  See HPI    PHYSICAL EXAM    ICU Vital Signs Last 24 Hrs  T(C): 36.1 (15 Apr 2020 04:00), Max: 36.1 (15 Apr 2020 04:00)  T(F): 97 (15 Apr 2020 04:00), Max: 97 (15 Apr 2020 04:00)  HR: 40 (15 Apr 2020 04:30) (40 - 64)  BP: 100/56 (15 Apr 2020 04:00) (98/47 - 123/59)  BP(mean): --  ABP: --  ABP(mean): --  RR: 30 (15 Apr 2020 04:00) (30 - 30)  SpO2: 100% (15 Apr 2020 04:00) (100% - 100%)      General: on sedation   HEENT:     et tube            Lymph Nodes: NO cervical LN   Lungs: Bilateral crackles   Cardiovascular: Regular   Abdomen: Soft, Positive BS  Extremities: No clubbing   Skin: warm   Neurological: no focal deficit   Musculoskeletal: move all ext     I&O's Detail    14 Apr 2020 07:01  -  15 Apr 2020 07:00  --------------------------------------------------------  IN:  Total IN: 0 mL    OUT:    Indwelling Catheter - Urethral: 180 mL  Total OUT: 180 mL    Total NET: -180 mL          LABS:                          10.3   10.73 )-----------( 146      ( 15 Apr 2020 04:30 )             30.2         15 Apr 2020 04:30    132    |  93     |  68     ----------------------------<  242    3.9     |  22     |  2.5      Ca    8.8        15 Apr 2020 04:30  Mg     3.0       14 Apr 2020 05:30    TPro  5.7    /  Alb  2.9    /  TBili  0.4    /  DBili  x      /  AST  19     /  ALT  11     /  AlkPhos  43     15 Apr 2020 04:30  Amylase x     lipase x                                                 PTT - ( 14 Apr 2020 05:30 )  PTT:>200.0 sec                                           Lactate, Blood: 1.3 mmol/L (04-15-20 @ 04:30)  Lactate, Blood: 1.1 mmol/L (04-14-20 @ 05:30)    CARDIAC MARKERS ( 14 Apr 2020 05:30 )  x     / 0.06 ng/mL / x     / x     / x                                                                                                         Mode: AC/ CMV (Assist Control/ Continuous Mandatory Ventilation)  RR (machine): 30  FiO2: 100  PEEP: 15  ITime: 1  PC: 30  PIP: 30                                      ABG - ( 15 Apr 2020 07:36 )  pH, Arterial: 7.46  pH, Blood: x     /  pCO2: 33    /  pO2: 199   / HCO3: 24    / Base Excess: 0.3   /  SaO2: 99                  MEDICATIONS  (STANDING):  aspirin  chewable 81 milliGRAM(s) Oral daily  chlorhexidine 4% Liquid 1 Application(s) Topical <User Schedule>  clopidogrel Tablet 75 milliGRAM(s) Oral daily  dextrose 5% 1000 milliLiter(s) (100 mL/Hr) IV Continuous <Continuous>  dextrose 5%. 1000 milliLiter(s) (50 mL/Hr) IV Continuous <Continuous>  dextrose 50% Injectable 12.5 Gram(s) IV Push once  dextrose 50% Injectable 25 Gram(s) IV Push once  dextrose 50% Injectable 25 Gram(s) IV Push once  folic acid 1 milliGRAM(s) Oral daily  heparin  Injectable 7500 Unit(s) SubCutaneous every 8 hours  hydroxychloroquine 400 milliGRAM(s) Oral every 24 hours  hydroxychloroquine   Oral   insulin glargine Injectable (LANTUS) 18 Unit(s) SubCutaneous at bedtime  insulin lispro (HumaLOG) corrective regimen sliding scale   SubCutaneous three times a day before meals  insulin lispro Injectable (HumaLOG) 6 Unit(s) SubCutaneous every 6 hours  meropenem  IVPB 1000 milliGRAM(s) IV Intermittent every 12 hours  methylPREDNISolone sodium succinate Injectable 60 milliGRAM(s) IV Push every 12 hours  metoprolol tartrate 50 milliGRAM(s) Oral two times a day  morphine  Infusion. 2 mG/Hr (2 mL/Hr) IV Continuous <Continuous>  norepinephrine Infusion 0.05 MICROgram(s)/kG/Min (5.53 mL/Hr) IV Continuous <Continuous>  pantoprazole   Suspension 40 milliGRAM(s) Oral daily  propofol Infusion 20.056 MICROgram(s)/kG/Min (14.2 mL/Hr) IV Continuous <Continuous>    MEDICATIONS  (PRN):  dextrose 40% Gel 15 Gram(s) Oral once PRN Blood Glucose LESS THAN 70 milliGRAM(s)/deciliter  glucagon  Injectable 1 milliGRAM(s) IntraMuscular once PRN Glucose LESS THAN 70 milligrams/deciliter          Xrays:  TLC:  OG:  ET tube:                                                                                    b/l opacity   cardiomegaly    ECHO:  [facility] ICU:

## 2020-04-15 NOTE — PROGRESS NOTE ADULT - SUBJECTIVE AND OBJECTIVE BOX
JEREMIAH, IRENE  70y, Female    All available historical data reviewed    OVERNIGHT EVENTS:  fio2 100 %    ROS:  unable to obtain history secondary to patient's mental status and/or sedation     VITALS:  T(F): 97, Max: 97 (04-15-20 @ 04:00)  HR: 40  BP: 100/56  RR: 30Vital Signs Last 24 Hrs  T(C): 36.1 (15 Apr 2020 04:00), Max: 36.1 (15 Apr 2020 04:00)  T(F): 97 (15 Apr 2020 04:00), Max: 97 (15 Apr 2020 04:00)  HR: 40 (15 Apr 2020 04:30) (40 - 64)  BP: 100/56 (15 Apr 2020 04:00) (98/47 - 114/56)  BP(mean): --  RR: 30 (15 Apr 2020 04:00) (30 - 30)  SpO2: 100% (15 Apr 2020 04:00) (100% - 100%)    TESTS & MEASUREMENTS:                        10.3   10.73 )-----------( 146      ( 15 Apr 2020 04:30 )             30.2     04-15    132<L>  |  93<L>  |  68<HH>  ----------------------------<  242<H>  3.9   |  22  |  2.5<H>    Ca    8.8      15 Apr 2020 04:30  Mg     3.0     04-14    TPro  5.7<L>  /  Alb  2.9<L>  /  TBili  0.4  /  DBili  x   /  AST  19  /  ALT  11  /  AlkPhos  43  04-15    LIVER FUNCTIONS - ( 15 Apr 2020 04:30 )  Alb: 2.9 g/dL / Pro: 5.7 g/dL / ALK PHOS: 43 U/L / ALT: 11 U/L / AST: 19 U/L / GGT: x             Culture - Blood (collected 04-14-20 @ 08:21)  Source: .Blood None  Preliminary Report (04-15-20 @ 13:01):    No growth to date.    Culture - Blood (collected 04-14-20 @ 08:21)  Source: .Blood None  Preliminary Report (04-15-20 @ 13:01):    No growth to date.            RADIOLOGY & ADDITIONAL TESTS:  Personal review of radiological diagnostics performed  Echo and EKG results noted when applicable.     MEDICATIONS:  aspirin  chewable 81 milliGRAM(s) Oral daily  chlorhexidine 4% Liquid 1 Application(s) Topical <User Schedule>  clopidogrel Tablet 75 milliGRAM(s) Oral daily  dextrose 40% Gel 15 Gram(s) Oral once PRN  dextrose 5% 1000 milliLiter(s) IV Continuous <Continuous>  dextrose 5%. 1000 milliLiter(s) IV Continuous <Continuous>  dextrose 50% Injectable 12.5 Gram(s) IV Push once  dextrose 50% Injectable 25 Gram(s) IV Push once  dextrose 50% Injectable 25 Gram(s) IV Push once  folic acid 1 milliGRAM(s) Oral daily  glucagon  Injectable 1 milliGRAM(s) IntraMuscular once PRN  heparin  Injectable 7500 Unit(s) SubCutaneous every 8 hours  hydroxychloroquine 400 milliGRAM(s) Oral every 24 hours  hydroxychloroquine   Oral   insulin glargine Injectable (LANTUS) 18 Unit(s) SubCutaneous at bedtime  insulin lispro (HumaLOG) corrective regimen sliding scale   SubCutaneous three times a day before meals  insulin lispro Injectable (HumaLOG) 6 Unit(s) SubCutaneous every 6 hours  meropenem  IVPB 500 milliGRAM(s) IV Intermittent every 12 hours  methylPREDNISolone sodium succinate Injectable 60 milliGRAM(s) IV Push every 12 hours  metoprolol tartrate 50 milliGRAM(s) Oral two times a day  morphine  Infusion. 2 mG/Hr IV Continuous <Continuous>  norepinephrine Infusion 0.05 MICROgram(s)/kG/Min IV Continuous <Continuous>  pantoprazole   Suspension 40 milliGRAM(s) Oral daily  propofol Infusion 20.056 MICROgram(s)/kG/Min IV Continuous <Continuous>      ANTIBIOTICS:  hydroxychloroquine 400 milliGRAM(s) Oral every 24 hours  hydroxychloroquine   Oral   meropenem  IVPB 500 milliGRAM(s) IV Intermittent every 12 hours

## 2020-04-15 NOTE — CHART NOTE - NSCHARTNOTEFT_GEN_A_CORE
Mike Mata called at [phone] for routine daily update with no answer x 2.    Voice message left that update will be call back will be performed the following day.    D/W IM team who will attempt call back, if possible.

## 2020-04-15 NOTE — PROGRESS NOTE ADULT - ASSESSMENT
· Assessment		  70y F w/ PMH of asthma (never hospitalized, never intubated), CAD s/p stent (07/2018), HTN, HLD, and DM (diet-controlled) presents from [facility] w/ the chief complaint of shortness of breath, fevers, chills for 1 week and has been progressively worsening.    IMPRESSION;   COVID19 with septic shock requiring pressors, resp failure, mechanical ventilation with ARDS with FiO2 100%, secondary to Cytokine Release Syndrome  -inflammatory markers elevated  -cytokine release syndrome  -elevated Ddimer is a poor prognostic indicator and differentiate survivors from non survivors  -procalcitonin of 0.65 suggestive of a bacterial PNA/infection   -has been on azithro/ merem in NH since 4/6  -BCx NG  Meets criteria for Anakinra. Hypoxic and elevated inflammatory marker     RECOMMENDATIONS;  BCx  nares ORSA  sputa cultures if possible  f/u COVID-19     PLAQUENIL 800 mg PO q24h x one dose, then 400mg x once per day for 4 more days.  -if QTc<500ms then start HCQ. No monitoring or serial EKGs required.  -if QTC>500ms then monitor EKG. Use MCOT. If tele/MCOT no serial EKGs  -d/c for QTc>550  -maintain K levels>4mEq/L, Mg>2mg/dl   Anakinra 100 mg SC q6h x 3d.   Could consider Solumedrol 1.5-2.0 mg / kg in 2 divided dosis for 3 days  Monitor oxygenation, CRP, Ferritin, Ddimers daily   Meropenem 1 gm iv q12h  poor prognosis  ferritin, CRP  Consider Lovenox .  -indication: ddimer>1000.   -exclusion: active bleeding, H/o HIT, recent hemorrhagic stroke

## 2020-04-16 NOTE — PROGRESS NOTE ADULT - ASSESSMENT
· Assessment		  70y F w/ PMH of asthma (never hospitalized, never intubated), CAD s/p stent (07/2018), HTN, HLD, and DM (diet-controlled) presents from [facility] w/ the chief complaint of shortness of breath, fevers, chills for 1 week and has been progressively worsening.    IMPRESSION;   COVID19 with septic shock requiring pressors, resp failure, mechanical ventilation with ARDS with FiO2 70%, secondary to Cytokine Release Syndrome  -inflammatory markers elevated  -cytokine release syndrome  -elevated Ddimer is a poor prognostic indicator and differentiate survivors from non survivors  -procalcitonin of 0.65 suggestive of a bacterial PNA/infection   -has been on azithro/ merem in NH since 4/6  -BCx NG  Meets criteria for Anakinra. Hypoxic and elevated inflammatory marker     RECOMMENDATIONS;  nares ORSA  sputa cultures if possible   PLAQUENIL 800 mg PO q24h x one dose, then 400mg x once per day for 4 more days.  -if QTc<500ms then start HCQ. No monitoring or serial EKGs required.  -if QTC>500ms then monitor EKG. Use MCOT. If tele/MCOT no serial EKGs  -d/c for QTc>550  -maintain K levels>4mEq/L, Mg>2mg/dl   Anakinra 100 mg SC q6h x 3d.   Solumedrol for 3 days  Monitor oxygenation, CRP, Ferritin, Ddimers daily   Meropenem 1 gm iv q12h  poor prognosis  ferritin, CRP  anticoagulation

## 2020-04-16 NOTE — PROGRESS NOTE ADULT - SUBJECTIVE AND OBJECTIVE BOX
Subjective:  The patient is sedated, intubated, ventilated.     Objective:      anakinra Injectable   SubCutaneous   aspirin  chewable 81 milliGRAM(s) Oral daily  chlorhexidine 4% Liquid 1 Application(s) Topical <User Schedule>  clopidogrel Tablet 75 milliGRAM(s) Oral daily  dextrose 40% Gel 15 Gram(s) Oral once PRN  dextrose 5%. 1000 milliLiter(s) IV Continuous <Continuous>  dextrose 50% Injectable 12.5 Gram(s) IV Push once  dextrose 50% Injectable 25 Gram(s) IV Push once  dextrose 50% Injectable 25 Gram(s) IV Push once  folic acid 1 milliGRAM(s) Oral daily  furosemide   Injectable 40 milliGRAM(s) IV Push daily  glucagon  Injectable 1 milliGRAM(s) IntraMuscular once PRN  heparin  Injectable 7500 Unit(s) SubCutaneous every 8 hours  hydroxychloroquine 400 milliGRAM(s) Oral every 24 hours  hydroxychloroquine   Oral   insulin glargine Injectable (LANTUS) 18 Unit(s) SubCutaneous at bedtime  insulin lispro (HumaLOG) corrective regimen sliding scale   SubCutaneous three times a day before meals  insulin lispro Injectable (HumaLOG) 6 Unit(s) SubCutaneous every 6 hours  meropenem  IVPB 500 milliGRAM(s) IV Intermittent every 12 hours  methylPREDNISolone sodium succinate Injectable 60 milliGRAM(s) IV Push daily  metoprolol tartrate 50 milliGRAM(s) Oral two times a day  morphine  Infusion. 2 mG/Hr IV Continuous <Continuous>  norepinephrine Infusion 0.05 MICROgram(s)/kG/Min IV Continuous <Continuous>  pantoprazole   Suspension 40 milliGRAM(s) Oral daily  propofol Infusion 20.056 MICROgram(s)/kG/Min IV Continuous <Continuous>      PHYSICAL EXAM:  General: Sedated, intubated, ventilated  Cardiac: S1 S2 heard regular  Pulmonary: Audible bilateral crackles  Abdomen: Soft, +BS     T(C): 37.2 (04-16-20 @ 04:00), Max: 37.2 (04-15-20 @ 23:30)  HR: 89 (04-16-20 @ 11:00) (54 - 128)  BP: 130/72 (04-16-20 @ 04:00) (92/58 - 142/66)  RR: 22 (04-16-20 @ 11:00) (22 - 30)  SpO2: 100% (04-16-20 @ 11:00) (96% - 100%)    LABS:                        11.5   12.89 )-----------( 206      ( 16 Apr 2020 04:30 )             34.3     04-16    135  |  90<L>  |  76<HH>  ----------------------------<  264<H>  3.3<L>   |  23  |  2.0<H>    Ca    9.3      16 Apr 2020 04:30  Phos  2.8     04-16    TPro  6.2  /  Alb  3.1<L>  /  TBili  0.5  /  DBili  x   /  AST  20  /  ALT  11  /  AlkPhos  53  04-16      Aspartate Aminotransferase (AST/SGOT): 20 U/L (04-16-20 @ 04:30)  Alanine Aminotransferase (ALT/SGPT): 11 U/L (04-16-20 @ 04:30)

## 2020-04-16 NOTE — CHART NOTE - NSCHARTNOTEFT_GEN_A_CORE
Spoke with Adolfo at [phone] and updated him on patient's status. Answered questions, addressed concerns.

## 2020-04-16 NOTE — PROGRESS NOTE ADULT - ASSESSMENT
70y F w/ PMH of asthma (never hospitalized, never intubated), CAD s/p stent (07/2018), HTN, HLD, and DM (diet-controlled) presents from [facility] w/ the chief complaint of shortness of breath, fevers, chills for 1 week and has been progressively worsening.    Impression   ARF hypoxic   -r/o COVID  -Suspected GNR PNA  -Asthma Exacerbation  ARDS  BRIDGETTE    Plan     1.CNS  sedation continue     2. CVS    -2D echo ordered recall   lasix 40 today     3. PULMONARY   -Asthma Exacerbation  change setting to 25/13 lower fio2 to 60 lowe rate to 22  -solumedrol 60 q12h started    4. INFECTIOUS DISEASE     on HCQ and merop  follow ID  start ANAKirna   follow ferritin ,crp     5. GI  -GI ppx, protonix  -once intubated, will start OG feeds    6. RENAL  -replete electrolytes PRN  follow renal   give lasix monitor Is and OS     7. Endocrine   -f/u FS, target FS <180    8. Hematology   -cbc q24h, transfuse if Hgb<7    9. DVT PROPHYLAXIS   heparin Sq Q 7500 hrs 5000    11. Code Status   -full code

## 2020-04-16 NOTE — PROGRESS NOTE ADULT - SUBJECTIVE AND OBJECTIVE BOX
NIRMAL DANIELS  70y, Female    All available historical data reviewed    OVERNIGHT EVENTS:    fio2 70%  ROS:  unable to obtain history secondary to patient's mental status and/or sedation     VITALS:  T(F): 99, Max: 99 (04-15-20 @ 23:30)  HR: 89  BP: 130/72  RR: 22Vital Signs Last 24 Hrs  T(C): 37.2 (16 Apr 2020 04:00), Max: 37.2 (15 Apr 2020 23:30)  T(F): 99 (16 Apr 2020 04:00), Max: 99 (15 Apr 2020 23:30)  HR: 89 (16 Apr 2020 11:00) (54 - 128)  BP: 130/72 (16 Apr 2020 04:00) (92/58 - 142/66)  BP(mean): --  RR: 22 (16 Apr 2020 11:00) (22 - 30)  SpO2: 100% (16 Apr 2020 11:00) (96% - 100%)    TESTS & MEASUREMENTS:                        11.5   12.89 )-----------( 206      ( 16 Apr 2020 04:30 )             34.3     04-16    135  |  90<L>  |  76<HH>  ----------------------------<  264<H>  3.3<L>   |  23  |  2.0<H>    Ca    9.3      16 Apr 2020 04:30  Phos  2.8     04-16    TPro  6.2  /  Alb  3.1<L>  /  TBili  0.5  /  DBili  x   /  AST  20  /  ALT  11  /  AlkPhos  53  04-16    LIVER FUNCTIONS - ( 16 Apr 2020 04:30 )  Alb: 3.1 g/dL / Pro: 6.2 g/dL / ALK PHOS: 53 U/L / ALT: 11 U/L / AST: 20 U/L / GGT: x             Culture - Blood (collected 04-14-20 @ 08:21)  Source: .Blood None  Preliminary Report (04-15-20 @ 13:01):    No growth to date.    Culture - Blood (collected 04-14-20 @ 08:21)  Source: .Blood None  Preliminary Report (04-15-20 @ 13:01):    No growth to date.            RADIOLOGY & ADDITIONAL TESTS:  Personal review of radiological diagnostics performed  Echo and EKG results noted when applicable.     MEDICATIONS:  anakinra Injectable   SubCutaneous   aspirin  chewable 81 milliGRAM(s) Oral daily  chlorhexidine 4% Liquid 1 Application(s) Topical <User Schedule>  clopidogrel Tablet 75 milliGRAM(s) Oral daily  dextrose 40% Gel 15 Gram(s) Oral once PRN  dextrose 5%. 1000 milliLiter(s) IV Continuous <Continuous>  dextrose 50% Injectable 12.5 Gram(s) IV Push once  dextrose 50% Injectable 25 Gram(s) IV Push once  dextrose 50% Injectable 25 Gram(s) IV Push once  folic acid 1 milliGRAM(s) Oral daily  furosemide   Injectable 40 milliGRAM(s) IV Push daily  glucagon  Injectable 1 milliGRAM(s) IntraMuscular once PRN  heparin  Injectable 7500 Unit(s) SubCutaneous every 8 hours  hydroxychloroquine 400 milliGRAM(s) Oral every 24 hours  hydroxychloroquine   Oral   insulin glargine Injectable (LANTUS) 18 Unit(s) SubCutaneous at bedtime  insulin lispro (HumaLOG) corrective regimen sliding scale   SubCutaneous three times a day before meals  insulin lispro Injectable (HumaLOG) 6 Unit(s) SubCutaneous every 6 hours  meropenem  IVPB 500 milliGRAM(s) IV Intermittent every 12 hours  methylPREDNISolone sodium succinate Injectable 60 milliGRAM(s) IV Push daily  metoprolol tartrate 50 milliGRAM(s) Oral two times a day  morphine  Infusion. 2 mG/Hr IV Continuous <Continuous>  norepinephrine Infusion 0.05 MICROgram(s)/kG/Min IV Continuous <Continuous>  pantoprazole   Suspension 40 milliGRAM(s) Oral daily  propofol Infusion 20.056 MICROgram(s)/kG/Min IV Continuous <Continuous>      ANTIBIOTICS:  hydroxychloroquine 400 milliGRAM(s) Oral every 24 hours  hydroxychloroquine   Oral   meropenem  IVPB 500 milliGRAM(s) IV Intermittent every 12 hours

## 2020-04-16 NOTE — PROGRESS NOTE ADULT - SUBJECTIVE AND OBJECTIVE BOX
Patient is a 70y old  Female who presents with a chief complaint of shortness of breath (15 Apr 2020 13:32)      Over Night Events:  Patient seen and examined still vented on sedation   morphine propofol levo       ROS:  See HPI    PHYSICAL EXAM    ICU Vital Signs Last 24 Hrs  T(C): 37.2 (16 Apr 2020 04:00), Max: 37.2 (15 Apr 2020 23:30)  T(F): 99 (16 Apr 2020 04:00), Max: 99 (15 Apr 2020 23:30)  HR: 69 (16 Apr 2020 04:00) (51 - 69)  BP: 130/72 (16 Apr 2020 04:00) (92/58 - 142/66)  BP(mean): --  ABP: --  ABP(mean): --  RR: 30 (16 Apr 2020 04:00) (30 - 30)  SpO2: 98% (16 Apr 2020 04:00) (96% - 99%)      General:on sedation   HEENT:       et tube          Lymph Nodes: NO cervical LN   Lungs: Bilateral BS  Cardiovascular: Regular   Abdomen: Soft, Positive BS  Extremities: No clubbing   Skin: warm   Neurological: no focal deficit   Musculoskeletal: move all ext     I&O's Detail    15 Apr 2020 07:01  -  16 Apr 2020 07:00  --------------------------------------------------------  IN:  Total IN: 0 mL    OUT:    Indwelling Catheter - Urethral: 2850 mL  Total OUT: 2850 mL    Total NET: -2850 mL          LABS:                          11.5   12.89 )-----------( 206      ( 16 Apr 2020 04:30 )             34.3         16 Apr 2020 04:30    135    |  90     |  76     ----------------------------<  264    3.3     |  23     |  2.0      Ca    9.3        16 Apr 2020 04:30  Phos  2.8       16 Apr 2020 04:30    TPro  6.2    /  Alb  3.1    /  TBili  0.5    /  DBili  x      /  AST  20     /  ALT  11     /  AlkPhos  53     16 Apr 2020 04:30  Amylase x     lipase x                                                                                            Lactate, Blood: 2.1 mmol/L (04-16-20 @ 04:30)  Lactate, Blood: 1.3 mmol/L (04-15-20 @ 04:30)  Lactate, Blood: 1.1 mmol/L (04-14-20 @ 05:30)                                                          Culture - Blood (collected 14 Apr 2020 08:21)  Source: .Blood None  Preliminary Report (15 Apr 2020 13:01):    No growth to date.    Culture - Blood (collected 14 Apr 2020 08:21)  Source: .Blood None  Preliminary Report (15 Apr 2020 13:01):    No growth to date.                                                   Mode: AC/ CMV (Assist Control/ Continuous Mandatory Ventilation)  RR (machine): 25  FiO2: 70  PEEP: 15  PC: 30  PIP: 30                                      ABG - ( 16 Apr 2020 04:13 )  pH, Arterial: 7.53  pH, Blood: x     /  pCO2: 30    /  pO2: 84    / HCO3: 25    / Base Excess: 2.9   /  SaO2: 96                  MEDICATIONS  (STANDING):  aspirin  chewable 81 milliGRAM(s) Oral daily  chlorhexidine 4% Liquid 1 Application(s) Topical <User Schedule>  clopidogrel Tablet 75 milliGRAM(s) Oral daily  dextrose 5%. 1000 milliLiter(s) (50 mL/Hr) IV Continuous <Continuous>  dextrose 50% Injectable 12.5 Gram(s) IV Push once  dextrose 50% Injectable 25 Gram(s) IV Push once  dextrose 50% Injectable 25 Gram(s) IV Push once  folic acid 1 milliGRAM(s) Oral daily  furosemide   IVPB 60 milliGRAM(s) IV Intermittent once  heparin  Injectable 7500 Unit(s) SubCutaneous every 8 hours  hydroxychloroquine 400 milliGRAM(s) Oral every 24 hours  hydroxychloroquine   Oral   insulin glargine Injectable (LANTUS) 18 Unit(s) SubCutaneous at bedtime  insulin lispro (HumaLOG) corrective regimen sliding scale   SubCutaneous three times a day before meals  insulin lispro Injectable (HumaLOG) 6 Unit(s) SubCutaneous every 6 hours  meropenem  IVPB 500 milliGRAM(s) IV Intermittent every 12 hours  methylPREDNISolone sodium succinate Injectable 60 milliGRAM(s) IV Push every 12 hours  metoprolol tartrate 50 milliGRAM(s) Oral two times a day  morphine  Infusion. 2 mG/Hr (2 mL/Hr) IV Continuous <Continuous>  norepinephrine Infusion 0.05 MICROgram(s)/kG/Min (5.53 mL/Hr) IV Continuous <Continuous>  pantoprazole   Suspension 40 milliGRAM(s) Oral daily  propofol Infusion 20.056 MICROgram(s)/kG/Min (14.2 mL/Hr) IV Continuous <Continuous>    MEDICATIONS  (PRN):  dextrose 40% Gel 15 Gram(s) Oral once PRN Blood Glucose LESS THAN 70 milliGRAM(s)/deciliter  glucagon  Injectable 1 milliGRAM(s) IntraMuscular once PRN Glucose LESS THAN 70 milligrams/deciliter          Xrays:  TLC:  OG:  ET tube:                                                                                    b/l opacity left effusion VS cardiomegaly / percardial effusion    ECHO:  [facility] ICU:

## 2020-04-16 NOTE — PROGRESS NOTE ADULT - ASSESSMENT
70y F w/ PMH of asthma (never hospitalized, never intubated), CAD s/p stent (07/2018), HTN, HLD, and DM (diet-controlled) presenting for respiratory distress sp intubation on MV now with BRIDGETTE  BRIDGETTE rule out ATN in the context of highly suspicious covid 19 infection/ cytokine storm/ Acidosis/ hyperkalemia/ HAGMA and respiratory acidosis  # creatinine trending  down  # uo improved  #continue lasix 40   # covid positive on anakinra / HCQ  #  ph level noted, no binders  # no acute indication for RRT  # will follow

## 2020-04-16 NOTE — PROGRESS NOTE ADULT - ASSESSMENT
70y F w/ PMH of asthma (never hospitalized, never intubated), CAD s/p stent (07/2018), HTN, HLD, and DM (diet-controlled) presents from [facility] w/ the chief complaint of shortness of breath, fevers, chills for 1 week and has been progressively worsening.      # Hypoxic respiratory failure  - Intubated, PCV I30/E15/R25/80%  - Etiology may include:     > Given pandemic > COVID - 19       + Pending COVID-19 PCR      + On HCQ hoping it would help      + Started Kineret on 4/16      + Solumedrol 60mg IV q12h > dropped to 40qd on 4/16      + On Heparin 7500U q8h subQ  - Inflammatory biomarkers > CRP 24 (was 20),  (was 432), Ferritin 1599 (was 1111), will re-assess after Kineret    > Bacterial LRTI - GN? Came from nursing home      + On meropenem and vancomycin (dose adjusted according to tc) ( Vancomycin level 20, on hold due to BRIDGETTE since 4/13)    > CXR shows enlarged cardiac silhouette > Decompensation of heart failure / fluid overload      + Pending cardiac ultrasound      + Avoid fluid overload    # BRIDGETTE   - Not known CKD, creatinine normal on admission   - Non-oliguric for now, receiving Lasix 40mg IV qd  - ATN? Drug induced? Ameliorating  - DC vancomycin on 4/13  - Pending urine studies    # Ischemic heart disease, HFrEF?  - On DAPT  - Metoprolol 50mg bid  - No ACEi/ARB > keep off for now  - On lasix 40mg qd    # DVT prophylaxis > On heparin subQ

## 2020-04-16 NOTE — PROGRESS NOTE ADULT - SUBJECTIVE AND OBJECTIVE BOX
PAST HISTORY  --------------------------------------------------------------------------------  No significant changes to PMH, PSH, FHx, SHx, unless otherwise noted    ALLERGIES & MEDICATIONS  --------------------------------------------------------------------------------  Allergies    No Known Allergies    Intolerances      Standing Inpatient Medications  anakinra Injectable   SubCutaneous   aspirin  chewable 81 milliGRAM(s) Oral daily  chlorhexidine 4% Liquid 1 Application(s) Topical <User Schedule>  clopidogrel Tablet 75 milliGRAM(s) Oral daily  dextrose 5%. 1000 milliLiter(s) IV Continuous <Continuous>  dextrose 50% Injectable 12.5 Gram(s) IV Push once  dextrose 50% Injectable 25 Gram(s) IV Push once  dextrose 50% Injectable 25 Gram(s) IV Push once  folic acid 1 milliGRAM(s) Oral daily  furosemide   Injectable 40 milliGRAM(s) IV Push daily  heparin  Injectable 7500 Unit(s) SubCutaneous every 8 hours  hydroxychloroquine 400 milliGRAM(s) Oral every 24 hours  hydroxychloroquine   Oral   insulin glargine Injectable (LANTUS) 18 Unit(s) SubCutaneous at bedtime  insulin lispro (HumaLOG) corrective regimen sliding scale   SubCutaneous three times a day before meals  insulin lispro Injectable (HumaLOG) 6 Unit(s) SubCutaneous every 6 hours  meropenem  IVPB 500 milliGRAM(s) IV Intermittent every 12 hours  methylPREDNISolone sodium succinate Injectable 60 milliGRAM(s) IV Push daily  metoprolol tartrate 50 milliGRAM(s) Oral two times a day  morphine  Infusion. 2 mG/Hr IV Continuous <Continuous>  norepinephrine Infusion 0.05 MICROgram(s)/kG/Min IV Continuous <Continuous>  pantoprazole   Suspension 40 milliGRAM(s) Oral daily  propofol Infusion 20.056 MICROgram(s)/kG/Min IV Continuous <Continuous>    PRN Inpatient Medications  dextrose 40% Gel 15 Gram(s) Oral once PRN  glucagon  Injectable 1 milliGRAM(s) IntraMuscular once PRN            VITALS/PHYSICAL EXAM  --------------------------------------------------------------------------------  T(C): 37.2 (04-16-20 @ 04:00), Max: 37.2 (04-15-20 @ 23:30)  HR: 67 (04-16-20 @ 08:00) (52 - 69)  BP: 132/99 (04-16-20 @ 08:00) (92/58 - 142/66)  RR: 22 (04-16-20 @ 08:00) (22 - 30)  SpO2: 98% (04-16-20 @ 08:00) (96% - 99%)  Wt(kg): --        04-15-20 @ 07:01  -  04-16-20 @ 07:00  --------------------------------------------------------  IN: 0 mL / OUT: 2850 mL / NET: -2850 mL    04-16-20 @ 07:01  -  04-16-20 @ 09:16  --------------------------------------------------------  IN: 22.6 mL / OUT: 40 mL / NET: -17.4 mL      Physical Exam:  	Gen: intubated/ventilated   LABS/STUDIES  --------------------------------------------------------------------------------              11.5   12.89 >-----------<  206      [04-16-20 @ 04:30]              34.3     135  |  90  |  76  ----------------------------<  264      [04-16-20 @ 04:30]  3.3   |  23  |  2.0        Ca     9.3     [04-16-20 @ 04:30]      Phos  2.8     [04-16-20 @ 04:30]    TPro  6.2  /  Alb  3.1  /  TBili  0.5  /  DBili  x   /  AST  20  /  ALT  11  /  AlkPhos  53  [04-16-20 @ 04:30]              [04-15-20 @ 04:30]    Creatinine Trend:  SCr 2.0 [04-16 @ 04:30]  SCr 2.5 [04-15 @ 04:30]  SCr 2.0 [04-14 @ 05:30]  SCr 1.1 [04-13 @ 09:37]        Ferritin 1599      [04-15-20 @ 04:30]

## 2020-04-17 NOTE — PROGRESS NOTE ADULT - ASSESSMENT
70y F w/ PMH of asthma (never hospitalized, never intubated), CAD s/p stent (07/2018), HTN, HLD, and DM (diet-controlled) presents from [facility] w/ the chief complaint of shortness of breath, fevers, chills for 1 week and has been progressively worsening.      # Hypoxic respiratory failure  - Intubated, PCV I30/E15/R25/80%  - Etiology may include:     > Given pandemic > COVID - 19       + Pending COVID-19 PCR      + On HCQ hoping it would help      + Started Kineret on 4/16      + Solumedrol 60mg IV q12h > dropped to 40qd on 4/16  - Inflammatory biomarkers > CRP 24 (was 20),  (was 432), Ferritin 1599 (was 1111), will re-assess after Kineret    > Bacterial LRTI - GN? Came from nursing home      + On meropenem and vancomycin (dose adjusted according to cledwayne) ( Vancomycin level 20, on hold due to BRIDGETTE since 4/13)      + Pending DTA cultures  - RV dilated, PA pressure elevated, d-dimer elevated, possibility of PE    > Started on heparin therapeutic on 4/17    > DC plavix to decrease incidence of bleed, last stent placed >1 year ago    # BRIDGETTE   - Not known CKD, creatinine normal on admission   - Non-oliguric for now, receiving Lasix 40mg IV qd  - ATN? Drug induced? Ameliorating  - DC vancomycin on 4/13  - Pending urine studies    # Ischemic heart disease  - On DAPT on admission  - Metoprolol 50mg bid discontinued on 4/17 for hemodynamic stability  - On lasix IV bid  - Echo done showed LVEF 66%, mod RV dysfunction, PASP 58mmHg.     # DVT prophylaxis > On heparin therapeutic

## 2020-04-17 NOTE — CHART NOTE - NSCHARTNOTEFT_GEN_A_CORE
Registered Dietitian Follow-Up     Patient Profile Reviewed                           Yes [x]   No []     Nutrition History Previously Obtained        Yes []  No [x] intubation      Pertinent Subjective Information: Remains intubated/sedated. Propofol increased. Nimbex ordered today. TF at goal.      Pertinent Medical Interventions: Hypoxic respiratory failure r/o COVID.  BRIDGETTE - Not known CKD, nephrology following. Ischemic heart disease, HFrEF?.      Diet order: Change feeds to Vital HP @ 300ml q6hr (provides 1185 kcals, 104 gms protein, 1008 ml free water)      Anthropometrics:  - Ht. 164 cm   - Wt. 118kg (4/13)   - %wt change  - BMI 43.9  - IBW     Pertinent Lab Data: (4/17) H/H 11.0/34.5  POCT glucose 150, 164  BUN 21  Cr 2.4   GFR 20      Pertinent Meds: heparin, lantus, humalog, nimbex, lasix, solumedrol, levophed, folic acid, morphine, protonix, propofol @ 25ml/hr = 660 kcals     Physical Findings:  - Appearance: intubated, 1+ edema b/l arm, b/l ankle  - GI function: LBM 4/16 non distended   - Tubes: Ogt  - Oral/Mouth cavity: NPO  - Skin: skin tear      Nutrition Requirements  Weight Used: dosing  118 kg IBW: 56.8 kg  Ve: 13.2  Tmax: 37.1  MAP: 64 @ 4/17/2020 0900, slightly adjusted     Calories: 1250 - 1500 kcals/day   1120 - 1306 kcals (60-70% [id number]) vs. 1250 - 1420 kcals (22-25 kcal/kg IBW) vs. 1298 - 1652 kcals (11-14 kcal/kg ABW)  Protein: 114 - 125 gms/day (2.0 - 2.2 gm/kg IBW)  Fluid: per team     Nutrient Intake: 1845 kcals, 104 gms protein     [] Previous Nutrition Diagnosis: Inadequate protein intake.             [] Ongoing          [x] Resolved    [] No active nutrition diagnosis identified at this time      NEW Nutr Dx:  Excessive energy intake RT current TF regimen + propofol rate AEB meets 123% calorie needs    Nutrition Intervention EN     Goal/Expected Outcome: Pt to meet % of needs in 4 days     Indicator/Monitoring:  RD to monitor energy intake, diet order, body comp, NFPF     Recommendation:   (1) Continuous feeds preferred if pt on paralytic - change feeds to Vital HP @ 30ml/hr + no carb prosource pkts 3x/day (provides 831 kcals, 95 gms protein, 605 ml free water) <-- does not include 660 kcals/day from propofol. Flushes per LIP.     (2) If continuous feeds not feasible, consider bolus feeds of Vital HP @ 240ml q8hr + prosource pkts 3x/day.    (3) While pt on Nimbex, hold feeds if signs of GI intolerance occur (distention)    (4) Hold feeds if MAP consistently <65

## 2020-04-17 NOTE — PROGRESS NOTE ADULT - SUBJECTIVE AND OBJECTIVE BOX
Patient is a 70y old  Female who presents with a chief complaint of shortness of breath (16 Apr 2020 11:46)      Over Night Events:  Patient seen and examined still vented       ROS:  See HPI    PHYSICAL EXAM    ICU Vital Signs Last 24 Hrs  T(C): 36.1 (17 Apr 2020 08:00), Max: 37.3 (16 Apr 2020 12:00)  T(F): 96.9 (17 Apr 2020 08:00), Max: 99.2 (16 Apr 2020 12:00)  HR: 67 (17 Apr 2020 08:00) (59 - 128)  BP: --  BP(mean): --  ABP: 108/46 (17 Apr 2020 08:00) (84/55 - 146/60)  ABP(mean): 69 (17 Apr 2020 08:00) (62 - 105)  RR: 25 (17 Apr 2020 08:00) (22 - 25)  SpO2: 94% (17 Apr 2020 08:00) (92% - 100%)      General: on sedation   HEENT:    et tube             Lymph Nodes: NO cervical LN   Lungs: Bilateral BS  Cardiovascular: Regular   Abdomen: Soft, Positive BS  Extremities: No clubbing   Skin: warm   Neurological: no focla deficit   Musculoskeletal: move all ext     I&O's Detail    16 Apr 2020 07:01  -  17 Apr 2020 07:00  --------------------------------------------------------  IN:    Enteral Tube Flush: 120 mL    morphine  Infusion.: 28 mL    norepinephrine Infusion: 52.1 mL    propofol Infusion: 273.6 mL    Vital High Protein: 600 mL  Total IN: 1073.7 mL    OUT:    Indwelling Catheter - Urethral: 250 mL  Total OUT: 250 mL    Total NET: 823.7 mL      17 Apr 2020 07:01  -  17 Apr 2020 09:03  --------------------------------------------------------  IN:    morphine  Infusion.: 4 mL  Total IN: 4 mL    OUT:  Total OUT: 0 mL    Total NET: 4 mL          LABS:                          11.0   22.43 )-----------( 247      ( 17 Apr 2020 04:30 )             34.5         17 Apr 2020 04:30    141    |  95     |  95     ----------------------------<  156    4.1     |  25     |  2.4      Ca    9.2        17 Apr 2020 04:30  Phos  2.8       16 Apr 2020 04:30    TPro  6.0    /  Alb  3.0    /  TBili  0.4    /  DBili  x      /  AST  20     /  ALT  10     /  AlkPhos  53     17 Apr 2020 04:30  Amylase x     lipase x                                                                                            Lactate, Blood: 2.1 mmol/L (04-16-20 @ 04:30)  Lactate, Blood: 1.3 mmol/L (04-15-20 @ 04:30)                                                                                                       Mode: PCV  RR (machine): 22  FiO2: 100  PEEP: 13  PIP: 26                                      ABG - ( 17 Apr 2020 07:27 )  pH, Arterial: 7.23  pH, Blood: x     /  pCO2: 68    /  pO2: 54    / HCO3: 28    / Base Excess: -0.7  /  SaO2: 80                  MEDICATIONS  (STANDING):  anakinra Injectable 100 milliGRAM(s) SubCutaneous every 6 hours  aspirin  chewable 81 milliGRAM(s) Oral daily  chlorhexidine 0.12% Liquid 15 milliLiter(s) Oral Mucosa two times a day  chlorhexidine 4% Liquid 1 Application(s) Topical <User Schedule>  clopidogrel Tablet 75 milliGRAM(s) Oral daily  dextrose 5%. 1000 milliLiter(s) (50 mL/Hr) IV Continuous <Continuous>  dextrose 50% Injectable 12.5 Gram(s) IV Push once  dextrose 50% Injectable 25 Gram(s) IV Push once  dextrose 50% Injectable 25 Gram(s) IV Push once  folic acid 1 milliGRAM(s) Oral daily  furosemide   Injectable 40 milliGRAM(s) IV Push daily  heparin  Injectable 7500 Unit(s) SubCutaneous every 8 hours  hydroxychloroquine 400 milliGRAM(s) Oral every 24 hours  hydroxychloroquine   Oral   insulin glargine Injectable (LANTUS) 18 Unit(s) SubCutaneous at bedtime  insulin lispro (HumaLOG) corrective regimen sliding scale   SubCutaneous three times a day before meals  insulin lispro Injectable (HumaLOG) 6 Unit(s) SubCutaneous every 6 hours  meropenem  IVPB 500 milliGRAM(s) IV Intermittent every 12 hours  methylPREDNISolone sodium succinate Injectable 60 milliGRAM(s) IV Push daily  metoprolol tartrate 50 milliGRAM(s) Oral two times a day  morphine  Infusion. 4 mG/Hr (4 mL/Hr) IV Continuous <Continuous>  norepinephrine Infusion 0.05 MICROgram(s)/kG/Min (5.53 mL/Hr) IV Continuous <Continuous>  pantoprazole   Suspension 40 milliGRAM(s) Oral daily  propofol Infusion 20.056 MICROgram(s)/kG/Min (14.2 mL/Hr) IV Continuous <Continuous>    MEDICATIONS  (PRN):  dextrose 40% Gel 15 Gram(s) Oral once PRN Blood Glucose LESS THAN 70 milliGRAM(s)/deciliter  glucagon  Injectable 1 milliGRAM(s) IntraMuscular once PRN Glucose LESS THAN 70 milligrams/deciliter          Xrays:  TLC:  OG:  ET tube:                                                                                    b/l opacity L>R   ECHO:  [facility] ICU:

## 2020-04-17 NOTE — PROGRESS NOTE ADULT - ASSESSMENT
70y F w/ PMH of asthma (never hospitalized, never intubated), CAD s/p stent (07/2018), HTN, HLD, and DM (diet-controlled) presents from [facility] w/ the chief complaint of shortness of breath, fevers, chills for 1 week and has been progressively worsening.    Impression   ARF hypoxic   -r/o COVID  -Suspected GNR PNA  viral PNA secondary   -Asthma Exacerbation  ARDS  BRIDGETTE    Plan     1.CNS  sedation continue     2. CVS     echo 66% moderate LVH , puln htn ?? rv dilatation   lasix 40 today     3. PULMONARY   -Asthma Exacerbation  change setting to 25/13 lower fio2 to 60 lowe rate to 22  -solumedrol 60 q12h started    4. INFECTIOUS DISEASE     on HCQ and merop send DTA   follow ID  start ANAKirna   follow ferritin ,crp d-dimer     5. GI  -GI ppx, protonix  -once intubated, will start OG feeds    6. RENAL  -replete electrolytes PRN  follow renal   give lasix monitor Is and OS     7. Endocrine   -f/u FS, target FS <180    8. Hematology   start heparin IV for now keep ptt 60 elevated d-dimer Rv dilatation   PA 58  9. DVT PROPHYLAXIS   heparin Sq Q 7500 hrs 5000    11. Code Status   -full code

## 2020-04-17 NOTE — PROGRESS NOTE ADULT - ASSESSMENT
· Assessment		  70y F w/ PMH of asthma (never hospitalized, never intubated), CAD s/p stent (07/2018), HTN, HLD, and DM (diet-controlled) presents from [facility] w/ the chief complaint of shortness of breath, fevers, chills for 1 week and has been progressively worsening.    IMPRESSION;   COVID19 with septic shock requiring pressors, resp failure, mechanical ventilation with ARDS with FiO2 100 %, secondary to Cytokine Release Syndrome  -inflammatory markers elevated  -cytokine release syndrome  -elevated Ddimer is a poor prognostic indicator and differentiate survivors from non survivors  -procalcitonin of 0.65 suggestive of a bacterial PNA/infection   -has been on azithro/ merem in [facility] since 4/6  -BCx NG  Meets criteria for Anakinra. Hypoxic and elevated inflammatory marker     RECOMMENDATIONS;  nares ORSA  sputa cultures if possible   PLAQUENIL 800 mg PO q24h x one dose, then 400mg x once per day for 4 more days.  -if QTc<500ms then start HCQ. No monitoring or serial EKGs required.  -if QTC>500ms then monitor EKG. Use MCOT. If tele/MCOT no serial EKGs  -d/c for QTc>550  -maintain K levels>4mEq/L, Mg>2mg/dl   Anakinra 100 mg SC q6h x 3d.   Solumedrol for 3 days  Monitor oxygenation, CRP, Ferritin, Ddimers daily   Meropenem 1 gm iv q12h  poor prognosis  ferritin, CRP  anticoagulation

## 2020-04-17 NOTE — PROGRESS NOTE ADULT - SUBJECTIVE AND OBJECTIVE BOX
NIRMAL DANIELS  70y, Female    All available historical data reviewed    OVERNIGHT EVENTS:  fio2 100 %    ROS:  unable to obtain history secondary to patient's mental status and/or sedation     VITALS:  T(F): 96.9, Max: 97.9 (04-16-20 @ 16:00)  HR: 66  BP: --  RR: 22Vital Signs Last 24 Hrs  T(C): 36.1 (17 Apr 2020 12:00), Max: 36.6 (16 Apr 2020 16:00)  T(F): 96.9 (17 Apr 2020 12:00), Max: 97.9 (16 Apr 2020 16:00)  HR: 66 (17 Apr 2020 12:00) (59 - 79)  BP: --  BP(mean): --  RR: 22 (17 Apr 2020 12:00) (22 - 25)  SpO2: 98% (17 Apr 2020 12:00) (92% - 98%)    TESTS & MEASUREMENTS:                        11.0   22.43 )-----------( 247      ( 17 Apr 2020 04:30 )             34.5     04-17    141  |  95<L>  |  95<HH>  ----------------------------<  156<H>  4.1   |  25  |  2.4<H>    Ca    9.2      17 Apr 2020 04:30  Phos  2.8     04-16    TPro  6.0  /  Alb  3.0<L>  /  TBili  0.4  /  DBili  x   /  AST  20  /  ALT  10  /  AlkPhos  53  04-17    LIVER FUNCTIONS - ( 17 Apr 2020 04:30 )  Alb: 3.0 g/dL / Pro: 6.0 g/dL / ALK PHOS: 53 U/L / ALT: 10 U/L / AST: 20 U/L / GGT: x             Culture - Blood (collected 04-14-20 @ 08:21)  Source: .Blood None  Preliminary Report (04-15-20 @ 13:01):    No growth to date.    Culture - Blood (collected 04-14-20 @ 08:21)  Source: .Blood None  Preliminary Report (04-15-20 @ 13:01):    No growth to date.            RADIOLOGY & ADDITIONAL TESTS:  Personal review of radiological diagnostics performed  Echo and EKG results noted when applicable.     MEDICATIONS:  anakinra Injectable 100 milliGRAM(s) SubCutaneous every 6 hours  aspirin  chewable 81 milliGRAM(s) Oral daily  chlorhexidine 0.12% Liquid 15 milliLiter(s) Oral Mucosa two times a day  chlorhexidine 4% Liquid 1 Application(s) Topical <User Schedule>  cisatracurium Infusion 3 MICROgram(s)/kG/Min IV Continuous <Continuous>  dextrose 40% Gel 15 Gram(s) Oral once PRN  dextrose 5%. 1000 milliLiter(s) IV Continuous <Continuous>  dextrose 50% Injectable 12.5 Gram(s) IV Push once  dextrose 50% Injectable 25 Gram(s) IV Push once  dextrose 50% Injectable 25 Gram(s) IV Push once  folic acid 1 milliGRAM(s) Oral daily  furosemide   Injectable 40 milliGRAM(s) IV Push two times a day  glucagon  Injectable 1 milliGRAM(s) IntraMuscular once PRN  heparin  Infusion 1900 Unit(s)/Hr IV Continuous <Continuous>  hydroxychloroquine 400 milliGRAM(s) Oral every 24 hours  hydroxychloroquine   Oral   insulin glargine Injectable (LANTUS) 18 Unit(s) SubCutaneous at bedtime  insulin lispro (HumaLOG) corrective regimen sliding scale   SubCutaneous three times a day before meals  insulin lispro Injectable (HumaLOG) 6 Unit(s) SubCutaneous every 6 hours  meropenem  IVPB 500 milliGRAM(s) IV Intermittent every 12 hours  methylPREDNISolone sodium succinate Injectable 60 milliGRAM(s) IV Push daily  morphine  Infusion. 4 mG/Hr IV Continuous <Continuous>  norepinephrine Infusion 0.05 MICROgram(s)/kG/Min IV Continuous <Continuous>  pantoprazole   Suspension 40 milliGRAM(s) Oral daily  propofol Infusion 20.056 MICROgram(s)/kG/Min IV Continuous <Continuous>      ANTIBIOTICS:  hydroxychloroquine 400 milliGRAM(s) Oral every 24 hours  hydroxychloroquine   Oral   meropenem  IVPB 500 milliGRAM(s) IV Intermittent every 12 hours

## 2020-04-17 NOTE — PROGRESS NOTE ADULT - SUBJECTIVE AND OBJECTIVE BOX
Subjective:  The patient is sedated, intubated, ventilated.     Objective:      anakinra Injectable 100 milliGRAM(s) SubCutaneous every 6 hours  aspirin  chewable 81 milliGRAM(s) Oral daily  chlorhexidine 0.12% Liquid 15 milliLiter(s) Oral Mucosa two times a day  chlorhexidine 4% Liquid 1 Application(s) Topical <User Schedule>  cisatracurium Infusion 3 MICROgram(s)/kG/Min IV Continuous <Continuous>  dextrose 40% Gel 15 Gram(s) Oral once PRN  dextrose 5%. 1000 milliLiter(s) IV Continuous <Continuous>  dextrose 50% Injectable 12.5 Gram(s) IV Push once  dextrose 50% Injectable 25 Gram(s) IV Push once  dextrose 50% Injectable 25 Gram(s) IV Push once  folic acid 1 milliGRAM(s) Oral daily  furosemide   Injectable 40 milliGRAM(s) IV Push two times a day  glucagon  Injectable 1 milliGRAM(s) IntraMuscular once PRN  heparin  Infusion 1900 Unit(s)/Hr IV Continuous <Continuous>  hydroxychloroquine 400 milliGRAM(s) Oral every 24 hours  hydroxychloroquine   Oral   insulin glargine Injectable (LANTUS) 18 Unit(s) SubCutaneous at bedtime  insulin lispro (HumaLOG) corrective regimen sliding scale   SubCutaneous three times a day before meals  insulin lispro Injectable (HumaLOG) 6 Unit(s) SubCutaneous every 6 hours  meropenem  IVPB 500 milliGRAM(s) IV Intermittent every 12 hours  methylPREDNISolone sodium succinate Injectable 60 milliGRAM(s) IV Push daily  morphine  Infusion. 4 mG/Hr IV Continuous <Continuous>  norepinephrine Infusion 0.05 MICROgram(s)/kG/Min IV Continuous <Continuous>  pantoprazole   Suspension 40 milliGRAM(s) Oral daily  propofol Infusion 20.056 MICROgram(s)/kG/Min IV Continuous <Continuous>      PHYSICAL EXAM:  General: Sedated, intubated, ventilated  Cardiac: S1 S2 heard regular  Pulmonary: Audible bilateral crackles  Abdomen: Soft, +BS     T(C): 36.1 (04-17-20 @ 08:00), Max: 37.3 (04-16-20 @ 12:00)  HR: 66 (04-17-20 @ 09:00) (59 - 79)  BP: --  RR: 25 (04-17-20 @ 09:00) (22 - 25)  SpO2: 92% (04-17-20 @ 09:00) (92% - 96%)    LABS:                        11.0   22.43 )-----------( 247      ( 17 Apr 2020 04:30 )             34.5     04-17    141  |  95<L>  |  95<HH>  ----------------------------<  156<H>  4.1   |  25  |  2.4<H>    Ca    9.2      17 Apr 2020 04:30  Phos  2.8     04-16    TPro  6.0  /  Alb  3.0<L>  /  TBili  0.4  /  DBili  x   /  AST  20  /  ALT  10  /  AlkPhos  53  04-17      Aspartate Aminotransferase (AST/SGOT): 20 U/L (04-17-20 @ 04:30)  Alanine Aminotransferase (ALT/SGPT): 10 U/L (04-17-20 @ 04:30)

## 2020-04-17 NOTE — PROGRESS NOTE ADULT - SUBJECTIVE AND OBJECTIVE BOX
24 h events noted  intubated/ventilated        PAST HISTORY  --------------------------------------------------------------------------------  No significant changes to PMH, PSH, FHx, SHx, unless otherwise noted    ALLERGIES & MEDICATIONS  --------------------------------------------------------------------------------  Allergies    No Known Allergies    Intolerances      Standing Inpatient Medications  anakinra Injectable 100 milliGRAM(s) SubCutaneous every 6 hours  aspirin  chewable 81 milliGRAM(s) Oral daily  chlorhexidine 0.12% Liquid 15 milliLiter(s) Oral Mucosa two times a day  chlorhexidine 4% Liquid 1 Application(s) Topical <User Schedule>  cisatracurium Infusion 3 MICROgram(s)/kG/Min IV Continuous <Continuous>  cisatracurium Injectable 11 milliGRAM(s) IV Push once  dextrose 5%. 1000 milliLiter(s) IV Continuous <Continuous>  dextrose 50% Injectable 12.5 Gram(s) IV Push once  dextrose 50% Injectable 25 Gram(s) IV Push once  dextrose 50% Injectable 25 Gram(s) IV Push once  folic acid 1 milliGRAM(s) Oral daily  furosemide   Injectable 40 milliGRAM(s) IV Push daily  heparin  Infusion 1900 Unit(s)/Hr IV Continuous <Continuous>  hydroxychloroquine 400 milliGRAM(s) Oral every 24 hours  hydroxychloroquine   Oral   insulin glargine Injectable (LANTUS) 18 Unit(s) SubCutaneous at bedtime  insulin lispro (HumaLOG) corrective regimen sliding scale   SubCutaneous three times a day before meals  insulin lispro Injectable (HumaLOG) 6 Unit(s) SubCutaneous every 6 hours  meropenem  IVPB 500 milliGRAM(s) IV Intermittent every 12 hours  methylPREDNISolone sodium succinate Injectable 60 milliGRAM(s) IV Push daily  morphine  Infusion. 4 mG/Hr IV Continuous <Continuous>  norepinephrine Infusion 0.05 MICROgram(s)/kG/Min IV Continuous <Continuous>  pantoprazole   Suspension 40 milliGRAM(s) Oral daily  propofol Infusion 20.056 MICROgram(s)/kG/Min IV Continuous <Continuous>    PRN Inpatient Medications  dextrose 40% Gel 15 Gram(s) Oral once PRN  glucagon  Injectable 1 milliGRAM(s) IntraMuscular once PRN        VITALS/PHYSICAL EXAM  --------------------------------------------------------------------------------  T(C): 36.1 (04-17-20 @ 08:00), Max: 37.3 (04-16-20 @ 12:00)  HR: 66 (04-17-20 @ 09:00) (59 - 128)  BP: --  RR: 25 (04-17-20 @ 09:00) (22 - 25)  SpO2: 92% (04-17-20 @ 09:00) (92% - 100%)  Wt(kg): --        04-16-20 @ 07:01  -  04-17-20 @ 07:00  --------------------------------------------------------  IN: 1073.7 mL / OUT: 250 mL / NET: 823.7 mL    04-17-20 @ 07:01  -  04-17-20 @ 09:44  --------------------------------------------------------  IN: 86 mL / OUT: 215 mL / NET: -129 mL      Physical Exam:  	Gen: intubated/ventilated     LABS/STUDIES  --------------------------------------------------------------------------------              11.0   22.43 >-----------<  247      [04-17-20 @ 04:30]              34.5     141  |  95  |  95  ----------------------------<  156      [04-17-20 @ 04:30]  4.1   |  25  |  2.4        Ca     9.2     [04-17-20 @ 04:30]      Phos  2.8     [04-16-20 @ 04:30]    TPro  6.0  /  Alb  3.0  /  TBili  0.4  /  DBili  x   /  AST  20  /  ALT  10  /  AlkPhos  53  [04-17-20 @ 04:30]      Creatinine Trend:  SCr 2.4 [04-17 @ 04:30]  SCr 2.0 [04-16 @ 04:30]  SCr 2.5 [04-15 @ 04:30]  SCr 2.0 [04-14 @ 05:30]  SCr 1.1 [04-13 @ 09:37]        Ferritin 1599      [04-15-20 @ 04:30]

## 2020-04-17 NOTE — PROGRESS NOTE ADULT - ASSESSMENT
70y F w/ PMH of asthma (never hospitalized, never intubated), CAD s/p stent (07/2018), HTN, HLD, and DM (diet-controlled) presenting for respiratory distress sp intubation on MV now with BRIDGETTE  BRIDGETTE rule out ATN in the context of highly suspicious covid 19 infection/ cytokine storm/ Acidosis/ hyperkalemia/ HAGMA and respiratory acidosis  # creatinine trending  up  # uo  15 cc/h   #increase lasix to 40 q 12   # covid positive on anakinra / HCQ  #  ph level noted, no binders  # no acute indication for RRT  # will follow

## 2020-04-17 NOTE — CHART NOTE - NSCHARTNOTEFT_GEN_A_CORE
Spoke with Adolfo and wife at [phone] and updated him on patient's status. Answered questions, addressed concerns.

## 2020-04-18 NOTE — PROGRESS NOTE ADULT - ASSESSMENT
70y F w/ PMH of asthma (never hospitalized, never intubated), CAD s/p stent (07/2018), HTN, HLD, and DM (diet-controlled) presents from [facility] w/ the chief complaint of shortness of breath, fevers, chills for 1 week and has been progressively worsening.      # Hypoxic respiratory failure  - Intubated, PCV I30/E15/R25/80%  - Etiology may include:     > Given pandemic > COVID - 19       + COVID-19 PCR +ve      + On HCQ hoping it would help      + Started Kineret on 4/16      + Solumedrol 60mg IV q12h > dropped to 40qd on 4/16  - Inflammatory biomarkers > CRP 24 (was 20),  (was 432), Ferritin 1599 (was 1111), will re-assess after Kineret    > Bacterial LRTI superinfection      + On meropenem and vancomycin (dose adjusted according to tc) ( Vancomycin level 20, on hold due to BRIDGETTE since 4/13)      + Pending DTA cultures  - RV dilated, PA pressure elevated, d-dimer elevated, possibility of PE    > Started on heparin therapeutic on 4/17    > DC plavix to decrease incidence of bleed, last stent placed >1 year ago    # BRIDGETTE   - Not known CKD, creatinine normal on admission   - Non-oliguric for now, receiving Lasix 40mg IV q12  - ATN? Drug induced? Ameliorating  - DC vancomycin on 4/13    # Ischemic heart disease  - On DAPT on admission - Held aspirin after starting therapeutic anticoagulation on 4/17  - Metoprolol 50mg bid discontinued on 4/17 for hemodynamic stability  - On lasix IV bid  - Echo done showed LVEF 66%, mod RV dysfunction, PASP 58mmHg.     # DVT prophylaxis > On heparin therapeutic

## 2020-04-18 NOTE — PROGRESS NOTE ADULT - ASSESSMENT
70y F w/ PMH of asthma (never hospitalized, never intubated), CAD s/p stent (07/2018), HTN, HLD, and DM (diet-controlled) presents from [facility] w/ the chief complaint of shortness of breath, fevers, chills for 1 week and has been progressively worsening.    Impression   ARF hypoxic   -r/o COVID  -Suspected GNR PNA  viral PNA secondary   -Asthma Exacerbation  ARDS  BRIDGETTE    Plan     1.CNS  sedation continue     2. CVS     echo 66% moderate LVH , puln htn ?? rv dilatation   lasix 40 today     3. PULMONARY   lower fioe to 80 and increasse rtate to 28n   -solumedrol 60 q12h started    4. INFECTIOUS DISEASE     on HCQ and merop send DTA   follow ID  start ANAKirna   follow ferritin ,crp d-dimer   follow id   5. GI  -GI ppx, protonix  -once intubated, will start OG feeds    6. RENAL  -replete electrolytes PRN  follow renal   give lasix monitor Is and OS     7. Endocrine   -f/u FS, target FS <180    8. Hematology   start heparin IV for now keep ptt 60 elevated d-dimer Rv dilatation   PA 58  9. DVT PROPHYLAXIS  heparin drip follow ptt kepp 60 elevated d-dimner     11. Code Status   -full code

## 2020-04-18 NOTE — PROGRESS NOTE ADULT - SUBJECTIVE AND OBJECTIVE BOX
Patient is a 70y old  Female who presents with a chief complaint of shortness of breath (18 Apr 2020 06:00)      Over Night Events:  Patient seen and examined still vented on sedation levo       ROS:  See HPI    PHYSICAL EXAM    ICU Vital Signs Last 24 Hrs  T(C): 35.6 (18 Apr 2020 00:00), Max: 36.1 (17 Apr 2020 08:00)  T(F): 96 (18 Apr 2020 00:00), Max: 97 (17 Apr 2020 16:00)  HR: 59 (18 Apr 2020 04:00) (58 - 69)  BP: --  BP(mean): --  ABP: 115/50 (18 Apr 2020 04:00) (104/45 - 128/45)  ABP(mean): 71 (18 Apr 2020 04:00) (63 - 74)  RR: 22 (18 Apr 2020 04:00) (22 - 25)  SpO2: 100% (18 Apr 2020 04:00) (92% - 100%)      General: on sedation   HEENT:      et tube           Lymph Nodes: NO cervical LN   Lungs: Bilateral BS  Cardiovascular: Regular   Abdomen: Soft, Positive BS  Extremities: No clubbing   Skin: warm   Neurological: no focla deficit   Musculoskeletal: move all ext     I&O's Detail    17 Apr 2020 07:01  -  18 Apr 2020 07:00  --------------------------------------------------------  IN:    cisatracurium Infusion: 152 mL    Enteral Tube Flush: 30 mL    heparin Infusion: 190 mL    IV PiggyBack: 50 mL    morphine  Infusion.: 4 mL    morphine  Infusion.: 44 mL    norepinephrine Infusion: 160 mL    propofol Infusion: 330 mL    Vital High Protein: 100 mL  Total IN: 1060 mL    OUT:    Indwelling Catheter - Urethral: 2500 mL  Total OUT: 2500 mL    Total NET: -1440 mL          LABS:                          10.5   21.91 )-----------( 231      ( 18 Apr 2020 06:00 )             33.3         18 Apr 2020 06:00    141    |  93     |  x      ----------------------------<  125    4.0     |  25     |  2.2      Ca    8.9        18 Apr 2020 06:00    TPro  5.9    /  Alb  3.0    /  TBili  0.4    /  DBili  x      /  AST  22     /  ALT  11     /  AlkPhos  52     18 Apr 2020 06:00  Amylase x     lipase x                                                 PTT - ( 18 Apr 2020 06:00 )  PTT:83.7 sec                                           Lactate, Blood: 2.1 mmol/L (04-16-20 @ 04:30)                                                                                                       Mode: AC/ CMV (Assist Control/ Continuous Mandatory Ventilation)  RR (machine): 22  FiO2: 100  PC: 26  PIP: 26                                      ABG - ( 18 Apr 2020 04:30 )  pH, Arterial: 7.26  pH, Blood: x     /  pCO2: 64    /  pO2: 116   / HCO3: 29    / Base Excess: 0.9   /  SaO2: 98                  MEDICATIONS  (STANDING):  anakinra Injectable 100 milliGRAM(s) SubCutaneous every 6 hours  aspirin  chewable 81 milliGRAM(s) Oral daily  chlorhexidine 0.12% Liquid 15 milliLiter(s) Oral Mucosa two times a day  chlorhexidine 4% Liquid 1 Application(s) Topical <User Schedule>  cisatracurium Infusion 3 MICROgram(s)/kG/Min (21.2 mL/Hr) IV Continuous <Continuous>  dextrose 5%. 1000 milliLiter(s) (50 mL/Hr) IV Continuous <Continuous>  dextrose 50% Injectable 12.5 Gram(s) IV Push once  dextrose 50% Injectable 25 Gram(s) IV Push once  dextrose 50% Injectable 25 Gram(s) IV Push once  folic acid 1 milliGRAM(s) Oral daily  furosemide   Injectable 40 milliGRAM(s) IV Push two times a day  heparin  Infusion 1900 Unit(s)/Hr (19 mL/Hr) IV Continuous <Continuous>  insulin glargine Injectable (LANTUS) 18 Unit(s) SubCutaneous at bedtime  insulin lispro (HumaLOG) corrective regimen sliding scale   SubCutaneous three times a day before meals  insulin lispro Injectable (HumaLOG) 6 Unit(s) SubCutaneous every 6 hours  meropenem  IVPB 500 milliGRAM(s) IV Intermittent every 12 hours  methylPREDNISolone sodium succinate Injectable 60 milliGRAM(s) IV Push daily  morphine  Infusion. 4 mG/Hr (4 mL/Hr) IV Continuous <Continuous>  norepinephrine Infusion 0.05 MICROgram(s)/kG/Min (5.53 mL/Hr) IV Continuous <Continuous>  pantoprazole   Suspension 40 milliGRAM(s) Oral daily  propofol Infusion 20.056 MICROgram(s)/kG/Min (14.2 mL/Hr) IV Continuous <Continuous>    MEDICATIONS  (PRN):  dextrose 40% Gel 15 Gram(s) Oral once PRN Blood Glucose LESS THAN 70 milliGRAM(s)/deciliter  glucagon  Injectable 1 milliGRAM(s) IntraMuscular once PRN Glucose LESS THAN 70 milligrams/deciliter          Xrays:  TLC:  OG:  ET tube:                                                                                    b/l opacity effsuion    ECHO:  CAM ICU:

## 2020-04-18 NOTE — PROGRESS NOTE ADULT - SUBJECTIVE AND OBJECTIVE BOX
Subjective:  The patient is sedated, intubated, ventilated.     Objective:      anakinra Injectable 100 milliGRAM(s) SubCutaneous every 6 hours  aspirin  chewable 81 milliGRAM(s) Oral daily  chlorhexidine 0.12% Liquid 15 milliLiter(s) Oral Mucosa two times a day  chlorhexidine 4% Liquid 1 Application(s) Topical <User Schedule>  cisatracurium Infusion 3 MICROgram(s)/kG/Min IV Continuous <Continuous>  dextrose 40% Gel 15 Gram(s) Oral once PRN  dextrose 5%. 1000 milliLiter(s) IV Continuous <Continuous>  dextrose 50% Injectable 12.5 Gram(s) IV Push once  dextrose 50% Injectable 25 Gram(s) IV Push once  dextrose 50% Injectable 25 Gram(s) IV Push once  folic acid 1 milliGRAM(s) Oral daily  furosemide   Injectable 40 milliGRAM(s) IV Push two times a day  glucagon  Injectable 1 milliGRAM(s) IntraMuscular once PRN  heparin  Infusion 1900 Unit(s)/Hr IV Continuous <Continuous>  insulin glargine Injectable (LANTUS) 18 Unit(s) SubCutaneous at bedtime  insulin lispro (HumaLOG) corrective regimen sliding scale   SubCutaneous three times a day before meals  insulin lispro Injectable (HumaLOG) 6 Unit(s) SubCutaneous every 6 hours  meropenem  IVPB 500 milliGRAM(s) IV Intermittent every 12 hours  methylPREDNISolone sodium succinate Injectable 60 milliGRAM(s) IV Push daily  morphine  Infusion. 4 mG/Hr IV Continuous <Continuous>  norepinephrine Infusion 0.05 MICROgram(s)/kG/Min IV Continuous <Continuous>  pantoprazole   Suspension 40 milliGRAM(s) Oral daily  propofol Infusion 20.056 MICROgram(s)/kG/Min IV Continuous <Continuous>      PHYSICAL EXAM:  General: Sedated, intubated, ventilated  Cardiac: S1 S2 heard regular  Pulmonary: Audible bilateral crackles  Abdomen: Soft, +BS     T(C): 35.6 (04-18-20 @ 00:00), Max: 35.6 (04-18-20 @ 00:00)  HR: 73 (04-18-20 @ 21:47) (58 - 94)  BP: --  RR: 28 (04-18-20 @ 20:00) (22 - 28)  SpO2: 98% (04-18-20 @ 21:47) (93% - 100%)    LABS:                        10.5   21.91 )-----------( 231      ( 18 Apr 2020 06:00 )             33.3     04-18    141  |  93<L>  |  116<HH>  ----------------------------<  125<H>  4.0   |  25  |  2.2<H>    Ca    8.9      18 Apr 2020 06:00    TPro  5.9<L>  /  Alb  3.0<L>  /  TBili  0.4  /  DBili  x   /  AST  22  /  ALT  11  /  AlkPhos  52  04-18    PTT - ( 18 Apr 2020 18:08 )  PTT:>200.0 sec  Aspartate Aminotransferase (AST/SGOT): 22 U/L (04-18-20 @ 06:00)  Alanine Aminotransferase (ALT/SGPT): 11 U/L (04-18-20 @ 06:00)

## 2020-04-18 NOTE — PROGRESS NOTE ADULT - SUBJECTIVE AND OBJECTIVE BOX
24 h events noted  intubated/ventilated         PAST HISTORY  --------------------------------------------------------------------------------  No significant changes to PMH, PSH, FHx, SHx, unless otherwise noted    ALLERGIES & MEDICATIONS  --------------------------------------------------------------------------------  Allergies    No Known Allergies    Intolerances      Standing Inpatient Medications  anakinra Injectable 100 milliGRAM(s) SubCutaneous every 6 hours  aspirin  chewable 81 milliGRAM(s) Oral daily  chlorhexidine 0.12% Liquid 15 milliLiter(s) Oral Mucosa two times a day  chlorhexidine 4% Liquid 1 Application(s) Topical <User Schedule>  cisatracurium Infusion 3 MICROgram(s)/kG/Min IV Continuous <Continuous>  dextrose 5%. 1000 milliLiter(s) IV Continuous <Continuous>  dextrose 50% Injectable 12.5 Gram(s) IV Push once  dextrose 50% Injectable 25 Gram(s) IV Push once  dextrose 50% Injectable 25 Gram(s) IV Push once  folic acid 1 milliGRAM(s) Oral daily  furosemide   Injectable 40 milliGRAM(s) IV Push two times a day  heparin  Infusion 1900 Unit(s)/Hr IV Continuous <Continuous>  insulin glargine Injectable (LANTUS) 18 Unit(s) SubCutaneous at bedtime  insulin lispro (HumaLOG) corrective regimen sliding scale   SubCutaneous three times a day before meals  insulin lispro Injectable (HumaLOG) 6 Unit(s) SubCutaneous every 6 hours  meropenem  IVPB 500 milliGRAM(s) IV Intermittent every 12 hours  methylPREDNISolone sodium succinate Injectable 60 milliGRAM(s) IV Push daily  morphine  Infusion. 4 mG/Hr IV Continuous <Continuous>  norepinephrine Infusion 0.05 MICROgram(s)/kG/Min IV Continuous <Continuous>  pantoprazole   Suspension 40 milliGRAM(s) Oral daily  propofol Infusion 20.056 MICROgram(s)/kG/Min IV Continuous <Continuous>    PRN Inpatient Medications  dextrose 40% Gel 15 Gram(s) Oral once PRN  glucagon  Injectable 1 milliGRAM(s) IntraMuscular once PRN        VITALS/PHYSICAL EXAM  --------------------------------------------------------------------------------  T(C): 35.6 (04-18-20 @ 00:00), Max: 36.1 (04-17-20 @ 08:00)  HR: 59 (04-18-20 @ 04:00) (58 - 69)  BP: --  RR: 22 (04-18-20 @ 04:00) (22 - 25)  SpO2: 100% (04-18-20 @ 04:00) (92% - 100%)  Wt(kg): --        04-16-20 @ 07:01  -  04-17-20 @ 07:00  --------------------------------------------------------  IN: 1073.7 mL / OUT: 250 mL / NET: 823.7 mL    04-17-20 @ 07:01  -  04-18-20 @ 06:00  --------------------------------------------------------  IN: 1060 mL / OUT: 1940 mL / NET: -880 mL      Physical Exam:  	Gen: intubated/ventilated       LABS/STUDIES  --------------------------------------------------------------------------------              11.0   22.43 >-----------<  247      [04-17-20 @ 04:30]              34.5     141  |  95  |  95  ----------------------------<  156      [04-17-20 @ 04:30]  4.1   |  25  |  2.4        Ca     9.2     [04-17-20 @ 04:30]    TPro  6.0  /  Alb  3.0  /  TBili  0.4  /  DBili  x   /  AST  20  /  ALT  10  /  AlkPhos  53  [04-17-20 @ 04:30]      PTT: 195.7      [04-17-20 @ 23:30]      Creatinine Trend:  SCr 2.4 [04-17 @ 04:30]  SCr 2.0 [04-16 @ 04:30]  SCr 2.5 [04-15 @ 04:30]  SCr 2.0 [04-14 @ 05:30]  SCr 1.1 [04-13 @ 09:37]        Ferritin 1227      [04-17-20 @ 17:36]

## 2020-04-18 NOTE — PROGRESS NOTE ADULT - ASSESSMENT
70y F w/ PMH of asthma (never hospitalized, never intubated), CAD s/p stent (07/2018), HTN, HLD, and DM (diet-controlled) presenting for respiratory distress sp intubation on MV now with BRIDGETTE  BRIDGETTE rule out ATN in the context of highly suspicious covid 19 infection/ cytokine storm/ Acidosis/ hyperkalemia/ HAGMA and respiratory acidosis  # creatinine trending  up, check repeat today  # UO improving   # continue lasix 40 q 12   # covid positive on anakinra / HCQ, ? dose of anakinra in BRIDGETTE for covid   # elevated BUN, d/c protein in diet   #  ph level noted, no binders  # no acute indication for RRT  # will follow

## 2020-04-19 NOTE — PROGRESS NOTE ADULT - ASSESSMENT
70y F w/ PMH of asthma (never hospitalized, never intubated), CAD s/p stent (07/2018), HTN, HLD, and DM (diet-controlled) presents from [facility] w/ the chief complaint of shortness of breath, fevers, chills for 1 week and has been progressively worsening.    Impression   ARF hypoxic   -r/o COVID  -Suspected GNR PNA  viral PNA secondary   -Asthma Exacerbation  ARDS  BRIDGETTE    Plan     1.CNS  sedation vacation to asses mental status     2. CVS     echo 66% moderate LVH , puln htn ?? rv dilatation   lasix 40 today     3. PULMONARY   lower fioe to 60 and increasse   -lower solumedrol 40 q12h started    4. INFECTIOUS DISEASE     on HCQ and merop send DTA   follow ID    ANAKirna   follow ferritin ,crp d-dimer   follow id   5. GI  -GI ppx, protonix  -once intubated, will start OG feeds    6. RENAL  -replete electrolytes PRN  follow renal   give lasix monitor Is and OS     7. Endocrine   -f/u FS, target FS <180    8. Hematology   start heparin IV for now keep ptt 60 elevated d-dimer Rv dilatation   PA 58  9. DVT PROPHYLAXIS  heparin drip follow ptt kepp 60 elevated d-dimner     11. Code Status   -full code

## 2020-04-19 NOTE — PROGRESS NOTE ADULT - ASSESSMENT
70y F w/ PMH of asthma (never hospitalized, never intubated), CAD s/p stent (07/2018), HTN, HLD, and DM (diet-controlled) presenting for respiratory distress sp intubation on MV now with BRIDGETTE  BRIDGETTE rule out ATN in the context of highly suspicious covid 19 infection/ cytokine storm/ Acidosis/ hyperkalemia/ HAGMA and respiratory acidosis  # creatinine improved   # UO improving  1.7 L yesterday  # change lasix to daily  # covid positive on anakinra / HCQ, ? dose of anakinra in BRIDGETTE for covid   # elevated BUN, liekly  related to steroids, dec protein in diet  #  ph level noted, no binders  # no acute indication for RRT    As cr improved UOP improved rosie lsign off  please recall w/ any questions or concerns

## 2020-04-19 NOTE — PROGRESS NOTE ADULT - SUBJECTIVE AND OBJECTIVE BOX
Patient is a 70y old  Female who presents with a chief complaint of shortness of breath (18 Apr 2020 23:12)      Over Night Events:  Patient seen and examined still venhted on sedation morphine       ROS:  See HPI    PHYSICAL EXAM    ICU Vital Signs Last 24 Hrs  T(C): 37.2 (19 Apr 2020 04:00), Max: 37.2 (18 Apr 2020 23:30)  T(F): 99 (19 Apr 2020 04:00), Max: 99 (18 Apr 2020 23:30)  HR: 70 (19 Apr 2020 04:00) (60 - 94)  BP: --  BP(mean): --  ABP: 110/62 (19 Apr 2020 04:00) (103/48 - 148/58)  ABP(mean): 74 (18 Apr 2020 16:00) (68 - 88)  RR: 28 (18 Apr 2020 20:00) (28 - 28)  SpO2: 99% (19 Apr 2020 04:00) (93% - 100%)      General: on sedation   HEENT:      et tube           Lymph Nodes: NO cervical LN   Lungs: Bilateral BS  Cardiovascular: Regular   Abdomen: Soft, Positive BS  Extremities: No clubbing   Skin: warm   Neurological: on sedation   Musculoskeletal: move all ext     I&O's Detail    18 Apr 2020 07:01  -  19 Apr 2020 07:00  --------------------------------------------------------  IN:    cisatracurium Infusion: 56.6 mL    heparin Infusion: 57 mL    morphine  Infusion.: 12 mL    norepinephrine Infusion: 31 mL    Vital High Protein: 90 mL  Total IN: 246.6 mL    OUT:    Indwelling Catheter - Urethral: 1750 mL  Total OUT: 1750 mL    Total NET: -1503.4 mL          LABS:                          9.6    20.86 )-----------( 260      ( 19 Apr 2020 05:09 )             29.9         19 Apr 2020 05:09    138    |  92     |  123    ----------------------------<  167    4.6     |  26     |  1.6      Ca    8.4        19 Apr 2020 05:09  Mg     3.0       19 Apr 2020 05:09    TPro  5.7    /  Alb  3.0    /  TBili  0.4    /  DBili  x      /  AST  18     /  ALT  9      /  AlkPhos  51     19 Apr 2020 05:09  Amylase x     lipase x                                                 PTT - ( 19 Apr 2020 05:09 )  PTT:77.0 sec                                                                                                                                                Mode: IPAP 26  RR (machine): 28  TV (machine): 416  FiO2: 80  PEEP: 13  PC: 26  PIP: 26                                      ABG - ( 19 Apr 2020 04:28 )  pH, Arterial: 7.36  pH, Blood: x     /  pCO2: 53    /  pO2: 118   / HCO3: 30    / Base Excess: 3.8   /  SaO2: 98                  MEDICATIONS  (STANDING):  anakinra Injectable 100 milliGRAM(s) SubCutaneous every 6 hours  aspirin  chewable 81 milliGRAM(s) Oral daily  chlorhexidine 0.12% Liquid 15 milliLiter(s) Oral Mucosa two times a day  chlorhexidine 4% Liquid 1 Application(s) Topical <User Schedule>  cisatracurium Infusion 3 MICROgram(s)/kG/Min (21.2 mL/Hr) IV Continuous <Continuous>  dextrose 5%. 1000 milliLiter(s) (50 mL/Hr) IV Continuous <Continuous>  dextrose 50% Injectable 12.5 Gram(s) IV Push once  dextrose 50% Injectable 25 Gram(s) IV Push once  dextrose 50% Injectable 25 Gram(s) IV Push once  folic acid 1 milliGRAM(s) Oral daily  furosemide   Injectable 40 milliGRAM(s) IV Push two times a day  heparin  Infusion 1900 Unit(s)/Hr (11 mL/Hr) IV Continuous <Continuous>  insulin glargine Injectable (LANTUS) 18 Unit(s) SubCutaneous at bedtime  insulin lispro (HumaLOG) corrective regimen sliding scale   SubCutaneous three times a day before meals  insulin lispro Injectable (HumaLOG) 6 Unit(s) SubCutaneous every 6 hours  meropenem  IVPB 500 milliGRAM(s) IV Intermittent every 12 hours  methylPREDNISolone sodium succinate Injectable 60 milliGRAM(s) IV Push daily  morphine  Infusion. 4 mG/Hr (4 mL/Hr) IV Continuous <Continuous>  norepinephrine Infusion 0.05 MICROgram(s)/kG/Min (5.53 mL/Hr) IV Continuous <Continuous>  pantoprazole   Suspension 40 milliGRAM(s) Oral daily  propofol Infusion 20.056 MICROgram(s)/kG/Min (14.2 mL/Hr) IV Continuous <Continuous>    MEDICATIONS  (PRN):  dextrose 40% Gel 15 Gram(s) Oral once PRN Blood Glucose LESS THAN 70 milliGRAM(s)/deciliter  glucagon  Injectable 1 milliGRAM(s) IntraMuscular once PRN Glucose LESS THAN 70 milligrams/deciliter          Xrays:  TLC:  OG:  ET tube:                                                                                    b/l opacity    ECHO:  CAM ICU:    :

## 2020-04-19 NOTE — PROGRESS NOTE ADULT - ASSESSMENT
· Assessment		  70y F w/ PMH of asthma (never hospitalized, never intubated), CAD s/p stent (07/2018), HTN, HLD, and DM (diet-controlled) presents from [facility] w/ the chief complaint of shortness of breath, fevers, chills for 1 week and has been progressively worsening.    IMPRESSION;   COVID19 with septic shock requiring pressors, resp failure, mechanical ventilation with ARDS with FiO2 80 %, secondary to Cytokine Release Syndrome  -inflammatory markers elevated  -elevated Ddimer is a poor prognostic indicator and differentiate survivors from non survivors  -procalcitonin of 0.65 > 0.47 suggestive of a bacterial PNA/infection   -has been on azithro/ merem in NH since 4/6  -BCx NG  s/p Anakinra    RECOMMENDATIONS;  nares ORSA  sputa cultures if possible  S/p PLAQUENIL   s/p Anakinra  4/15-18  d/c Solumedrol   Monitor oxygenation, CRP, Ferritin, Ddimers daily   Meropenem 1 gm iv q12h  poor prognosis  ferritin, CRP  anticoagulation

## 2020-04-19 NOTE — PROGRESS NOTE ADULT - SUBJECTIVE AND OBJECTIVE BOX
NIRMAL DANIELS  70y, Female    All available historical data reviewed    OVERNIGHT EVENTS:  fio2 80%    ROS:  unable to obtain history secondary to patient's mental status and/or sedation     VITALS:  T(F): 99, Max: 99 (04-18-20 @ 23:30)  HR: 72  BP: --  RR: 28Vital Signs Last 24 Hrs  T(C): 37.2 (19 Apr 2020 04:00), Max: 37.2 (18 Apr 2020 23:30)  T(F): 99 (19 Apr 2020 04:00), Max: 99 (18 Apr 2020 23:30)  HR: 72 (19 Apr 2020 10:46) (60 - 83)  BP: --  BP(mean): --  RR: 28 (18 Apr 2020 20:00) (28 - 28)  SpO2: 98% (19 Apr 2020 10:46) (93% - 100%)    TESTS & MEASUREMENTS:                        9.6    20.86 )-----------( 260      ( 19 Apr 2020 05:09 )             29.9     04-19    138  |  92<L>  |  123<HH>  ----------------------------<  167<H>  4.6   |  26  |  1.6<H>    Ca    8.4<L>      19 Apr 2020 05:09  Mg     3.0     04-19    TPro  5.7<L>  /  Alb  3.0<L>  /  TBili  0.4  /  DBili  x   /  AST  18  /  ALT  9   /  AlkPhos  51  04-19    LIVER FUNCTIONS - ( 19 Apr 2020 05:09 )  Alb: 3.0 g/dL / Pro: 5.7 g/dL / ALK PHOS: 51 U/L / ALT: 9 U/L / AST: 18 U/L / GGT: x             Culture - Blood (collected 04-14-20 @ 08:21)  Source: .Blood None  Preliminary Report (04-15-20 @ 13:01):    No growth to date.    Culture - Blood (collected 04-14-20 @ 08:21)  Source: .Blood None  Preliminary Report (04-15-20 @ 13:01):    No growth to date.            RADIOLOGY & ADDITIONAL TESTS:  Personal review of radiological diagnostics performed  Echo and EKG results noted when applicable.     MEDICATIONS:  aspirin  chewable 81 milliGRAM(s) Oral daily  chlorhexidine 0.12% Liquid 15 milliLiter(s) Oral Mucosa two times a day  chlorhexidine 4% Liquid 1 Application(s) Topical <User Schedule>  cisatracurium Infusion 3 MICROgram(s)/kG/Min IV Continuous <Continuous>  dextrose 40% Gel 15 Gram(s) Oral once PRN  dextrose 5%. 1000 milliLiter(s) IV Continuous <Continuous>  dextrose 50% Injectable 12.5 Gram(s) IV Push once  dextrose 50% Injectable 25 Gram(s) IV Push once  dextrose 50% Injectable 25 Gram(s) IV Push once  folic acid 1 milliGRAM(s) Oral daily  furosemide   Injectable 40 milliGRAM(s) IV Push two times a day  glucagon  Injectable 1 milliGRAM(s) IntraMuscular once PRN  heparin  Infusion 1900 Unit(s)/Hr IV Continuous <Continuous>  insulin glargine Injectable (LANTUS) 18 Unit(s) SubCutaneous at bedtime  insulin lispro (HumaLOG) corrective regimen sliding scale   SubCutaneous three times a day before meals  insulin lispro Injectable (HumaLOG) 6 Unit(s) SubCutaneous every 6 hours  meropenem  IVPB 500 milliGRAM(s) IV Intermittent every 12 hours  methylPREDNISolone sodium succinate Injectable 40 milliGRAM(s) IV Push daily  morphine  Infusion. 4 mG/Hr IV Continuous <Continuous>  norepinephrine Infusion 0.05 MICROgram(s)/kG/Min IV Continuous <Continuous>  pantoprazole   Suspension 40 milliGRAM(s) Oral daily  propofol Infusion 20.056 MICROgram(s)/kG/Min IV Continuous <Continuous>      ANTIBIOTICS:  meropenem  IVPB 500 milliGRAM(s) IV Intermittent every 12 hours

## 2020-04-19 NOTE — PROGRESS NOTE ADULT - SUBJECTIVE AND OBJECTIVE BOX
Nephrology progress note    Patient was seen and examined, events over the last 24 h noted .  Cr improving  eleavted BUN noted     Allergies:  No Known Allergies    Hospital Medications:   MEDICATIONS  (STANDING):  aspirin  chewable 81 milliGRAM(s) Oral daily  chlorhexidine 0.12% Liquid 15 milliLiter(s) Oral Mucosa two times a day  chlorhexidine 4% Liquid 1 Application(s) Topical <User Schedule>  cisatracurium Infusion 3 MICROgram(s)/kG/Min (21.2 mL/Hr) IV Continuous <Continuous>  dextrose 5%. 1000 milliLiter(s) (50 mL/Hr) IV Continuous <Continuous>  dextrose 50% Injectable 12.5 Gram(s) IV Push once  dextrose 50% Injectable 25 Gram(s) IV Push once  dextrose 50% Injectable 25 Gram(s) IV Push once  folic acid 1 milliGRAM(s) Oral daily  furosemide   Injectable 40 milliGRAM(s) IV Push two times a day  heparin  Infusion 1900 Unit(s)/Hr (11 mL/Hr) IV Continuous <Continuous>  insulin glargine Injectable (LANTUS) 18 Unit(s) SubCutaneous at bedtime  insulin lispro (HumaLOG) corrective regimen sliding scale   SubCutaneous three times a day before meals  insulin lispro Injectable (HumaLOG) 6 Unit(s) SubCutaneous every 6 hours  meropenem  IVPB 500 milliGRAM(s) IV Intermittent every 12 hours  methylPREDNISolone sodium succinate Injectable 40 milliGRAM(s) IV Push daily  morphine  Infusion. 4 mG/Hr (4 mL/Hr) IV Continuous <Continuous>  norepinephrine Infusion 0.05 MICROgram(s)/kG/Min (5.53 mL/Hr) IV Continuous <Continuous>  pantoprazole   Suspension 40 milliGRAM(s) Oral daily  propofol Infusion 20.056 MICROgram(s)/kG/Min (14.2 mL/Hr) IV Continuous <Continuous>        VITALS:  T(F): 99 (04-19-20 @ 04:00), Max: 99 (04-18-20 @ 23:30)  HR: 72 (04-19-20 @ 10:46)  BP: --  RR: 28 (04-18-20 @ 20:00)  SpO2: 98% (04-19-20 @ 10:46)  Wt(kg): --    04-17 @ 07:01  -  04-18 @ 07:00  --------------------------------------------------------  IN: 1060 mL / OUT: 2500 mL / NET: -1440 mL    04-18 @ 07:01  -  04-19 @ 07:00  --------------------------------------------------------  IN: 246.6 mL / OUT: 1750 mL / NET: -1503.4 mL    04-19 @ 07:01 - 04-19 @ 11:45  --------------------------------------------------------  IN: 87.2 mL / OUT: 0 mL / NET: 87.2 mL          PHYSICAL EXAM:  	Gen: intubated/ventilated    LABS:  04-19    138  |  92<L>  |  123<HH>  ----------------------------<  167<H>  4.6   |  26  |  1.6<H>    Ca    8.4<L>      19 Apr 2020 05:09  Mg     3.0     04-19    TPro  5.7<L>  /  Alb  3.0<L>  /  TBili  0.4  /  DBili      /  AST  18  /  ALT  9   /  AlkPhos  51  04-19                          9.6    20.86 )-----------( 260      ( 19 Apr 2020 05:09 )             29.9       Urine Studies:      RADIOLOGY & ADDITIONAL STUDIES:

## 2020-04-20 NOTE — PROGRESS NOTE ADULT - ASSESSMENT
70y F w/ PMH of asthma (never hospitalized, never intubated), CAD s/p stent (07/2018), HTN, HLD, and DM (diet-controlled) presents from [facility] w/ the chief complaint of shortness of breath, fevers, chills for 1 week and has been progressively worsening.    Impression   ARF hypoxic   -r/o COVID  -Suspected GNR PNA  viral PNA secondary   -Asthma Exacerbation  ARDS  BRIDGETTE    Plan     1.CNS  sedation vacation to asses mental status   d/c nimbex   2. CVS     echo 66% moderate LVH , puln htn ?? rv dilatation       3. PULMONARY   lower fio2 to 65 and rate to 24   -lower solumedrol 40 Q 2 4 hrs     4. INFECTIOUS DISEASE     on HC  on angeli   follow ID    ANAKirna   follow ferritin ,crp d-dimer   follow id   5. GI  -GI ppx, protonix  -once intubated, will start OG feeds    6. RENAL  -replete electrolytes PRN  follow renal   on lasix Q 24 hrs    monitor Is and OS     7. Endocrine   -f/u FS, target FS <180    8. Hematology   start heparin IV for now keep ptt 60 elevated d-dimer Rv dilatation   PA 58  9. DVT PROPHYLAXIS  heparin drip follow ptt kepp 60 elevated d-dimner     11. Code Status   -full code

## 2020-04-20 NOTE — PROGRESS NOTE ADULT - SUBJECTIVE AND OBJECTIVE BOX
Patient is a 70y old  Female who presents with a chief complaint of shortness of breath (19 Apr 2020 11:45)      Over Night Events:  Patient seen and examined still vented on sedation she woke up yesterday on vacation as per team     ROS:  See HPI    PHYSICAL EXAM    ICU Vital Signs Last 24 Hrs  T(C): 36.4 (20 Apr 2020 08:08), Max: 37 (19 Apr 2020 20:00)  T(F): 97.6 (20 Apr 2020 08:08), Max: 98.6 (19 Apr 2020 20:00)  HR: 77 (20 Apr 2020 08:08) (72 - 88)  BP: --  BP(mean): --  ABP: 126/53 (20 Apr 2020 08:08) (106/50 - 130/56)  ABP(mean): 72 (20 Apr 2020 06:15) (70 - 78)  RR: 22 (20 Apr 2020 08:08) (22 - 22)  SpO2: 96% (20 Apr 2020 08:08) (92% - 99%)      General: on sedation   HEENT:         et tube       Lymph Nodes: NO cervical LN   Lungs: Bilateral BS  Cardiovascular: Regular   Abdomen: Soft, Positive BS  Extremities: No clubbing   Skin: warm   Neurological:  no focal deficit   Musculoskeletal: move all ext     I&O's Detail    19 Apr 2020 07:01  -  20 Apr 2020 07:00  --------------------------------------------------------  IN:    cisatracurium Infusion: 42.4 mL    heparin Infusion: 33 mL    morphine  Infusion.: 12 mL    norepinephrine Infusion: 35.5 mL    propofol Infusion: 84.9 mL    Vital High Protein: 60 mL  Total IN: 267.8 mL    OUT:    Indwelling Catheter - Urethral: 2800 mL  Total OUT: 2800 mL    Total NET: -2532.2 mL          LABS:                          9.7    18.23 )-----------( 294      ( 20 Apr 2020 04:10 )             29.6         20 Apr 2020 04:10    141    |  100    |  104    ----------------------------<  123    3.5     |  23     |  1.2      Ca    7.4        20 Apr 2020 04:10  Mg     2.4       20 Apr 2020 04:10    TPro  5.1    /  Alb  2.7    /  TBili  0.4    /  DBili  x      /  AST  14     /  ALT  7      /  AlkPhos  41     20 Apr 2020 04:10  Amylase x     lipase x                                                 PTT - ( 20 Apr 2020 04:10 )  PTT:58.5 sec                                                                                                                                                Mode: PCV  FiO2: 70  PEEP: 13  PC: 28  PIP: 28                                      ABG - ( 20 Apr 2020 06:26 )  pH, Arterial: 7.52  pH, Blood: x     /  pCO2: 31    /  pO2: 74    / HCO3: 25    / Base Excess: 2.2   /  SaO2: 96                  MEDICATIONS  (STANDING):  artificial tears (preservative free) Ophthalmic Solution 1 Drop(s) Both EYES two times a day  aspirin  chewable 81 milliGRAM(s) Oral daily  chlorhexidine 0.12% Liquid 15 milliLiter(s) Oral Mucosa two times a day  chlorhexidine 4% Liquid 1 Application(s) Topical <User Schedule>  cisatracurium Infusion 3 MICROgram(s)/kG/Min (21.2 mL/Hr) IV Continuous <Continuous>  dextrose 5%. 1000 milliLiter(s) (50 mL/Hr) IV Continuous <Continuous>  dextrose 50% Injectable 12.5 Gram(s) IV Push once  dextrose 50% Injectable 25 Gram(s) IV Push once  dextrose 50% Injectable 25 Gram(s) IV Push once  folic acid 1 milliGRAM(s) Oral daily  furosemide   Injectable 40 milliGRAM(s) IV Push daily  heparin  Infusion 1900 Unit(s)/Hr (11 mL/Hr) IV Continuous <Continuous>  insulin glargine Injectable (LANTUS) 18 Unit(s) SubCutaneous at bedtime  insulin lispro (HumaLOG) corrective regimen sliding scale   SubCutaneous three times a day before meals  insulin lispro Injectable (HumaLOG) 6 Unit(s) SubCutaneous every 6 hours  meropenem  IVPB 500 milliGRAM(s) IV Intermittent every 12 hours  methylPREDNISolone sodium succinate Injectable 40 milliGRAM(s) IV Push daily  morphine  Infusion. 4 mG/Hr (4 mL/Hr) IV Continuous <Continuous>  norepinephrine Infusion 0.05 MICROgram(s)/kG/Min (5.53 mL/Hr) IV Continuous <Continuous>  pantoprazole   Suspension 40 milliGRAM(s) Oral daily  propofol Infusion 20.056 MICROgram(s)/kG/Min (14.2 mL/Hr) IV Continuous <Continuous>    MEDICATIONS  (PRN):  dextrose 40% Gel 15 Gram(s) Oral once PRN Blood Glucose LESS THAN 70 milliGRAM(s)/deciliter  glucagon  Injectable 1 milliGRAM(s) IntraMuscular once PRN Glucose LESS THAN 70 milligrams/deciliter          Xrays:  TLC:  OG:  ET tube:                                                                                    b/l opacity    ECHO:  CAM ICU:

## 2020-04-20 NOTE — PROGRESS NOTE ADULT - SUBJECTIVE AND OBJECTIVE BOX
Subjective:  The patient is sedated, intubated, ventilated.     Objective:      artificial tears (preservative free) Ophthalmic Solution 1 Drop(s) Both EYES two times a day  aspirin  chewable 81 milliGRAM(s) Oral daily  chlorhexidine 0.12% Liquid 15 milliLiter(s) Oral Mucosa two times a day  chlorhexidine 4% Liquid 1 Application(s) Topical <User Schedule>  dextrose 40% Gel 15 Gram(s) Oral once PRN  dextrose 5%. 1000 milliLiter(s) IV Continuous <Continuous>  dextrose 50% Injectable 12.5 Gram(s) IV Push once  dextrose 50% Injectable 25 Gram(s) IV Push once  dextrose 50% Injectable 25 Gram(s) IV Push once  folic acid 1 milliGRAM(s) Oral daily  furosemide   Injectable 40 milliGRAM(s) IV Push daily  glucagon  Injectable 1 milliGRAM(s) IntraMuscular once PRN  heparin  Infusion 1900 Unit(s)/Hr IV Continuous <Continuous>  insulin glargine Injectable (LANTUS) 18 Unit(s) SubCutaneous at bedtime  insulin lispro (HumaLOG) corrective regimen sliding scale   SubCutaneous three times a day before meals  insulin lispro Injectable (HumaLOG) 6 Unit(s) SubCutaneous every 6 hours  meropenem  IVPB 500 milliGRAM(s) IV Intermittent every 12 hours  methylPREDNISolone sodium succinate Injectable 40 milliGRAM(s) IV Push daily  morphine  Infusion. 4 mG/Hr IV Continuous <Continuous>  norepinephrine Infusion 0.05 MICROgram(s)/kG/Min IV Continuous <Continuous>  pantoprazole   Suspension 40 milliGRAM(s) Oral daily  propofol Infusion 20.056 MICROgram(s)/kG/Min IV Continuous <Continuous>      PHYSICAL EXAM:  General: Sedated, intubated, ventilated  Cardiac: S1 S2 heard regular  Pulmonary: Audible bilateral crackles  Abdomen: Soft, +BS     T(C): 36.4 (04-20-20 @ 08:08), Max: 37 (04-19-20 @ 20:00)  HR: 81 (04-20-20 @ 08:20) (72 - 88)  BP: --  RR: 22 (04-20-20 @ 08:08) (22 - 22)  SpO2: 95% (04-20-20 @ 08:20) (92% - 99%)    LABS:                        9.7    18.23 )-----------( 294      ( 20 Apr 2020 04:10 )             29.6     04-20    141  |  100  |  104<HH>  ----------------------------<  123<H>  3.5   |  23  |  1.2    Ca    7.4<L>      20 Apr 2020 04:10  Mg     2.4     04-20    TPro  5.1<L>  /  Alb  2.7<L>  /  TBili  0.4  /  DBili  x   /  AST  14  /  ALT  7   /  AlkPhos  41  04-20    PTT - ( 20 Apr 2020 04:10 )  PTT:58.5 sec  Alanine Aminotransferase (ALT/SGPT): 7 U/L (04-20-20 @ 04:10)  Aspartate Aminotransferase (AST/SGOT): 14 U/L (04-20-20 @ 04:10)

## 2020-04-20 NOTE — PROGRESS NOTE ADULT - ASSESSMENT
70y F w/ PMH of asthma (never hospitalized, never intubated), CAD s/p stent (07/2018), HTN, HLD, and DM (diet-controlled) presents from [facility] w/ the chief complaint of shortness of breath, fevers, chills for 1 week and has been progressively worsening.      # Hypoxic respiratory failure  - Intubated, PCV I30/E15/R25/80%  - Etiology may include:     > Given pandemic > COVID - 19       + COVID-19 PCR +ve      + On HCQ hoping it would help      + Started Kineret on 4/16      + Solumedrol 60mg IV q12h > dropped to 40qd on 4/16  - Inflammatory biomarkers > CRP 24 (was 20),  (was 432), Ferritin 1599 (was 1111) >> after kineret >> CRP downtrending now 4.5, ferritin 1100    > Bacterial LRTI superinfection      + On meropenem and vancomycin (dose adjusted according to clerance) ( Vancomycin level 20, on hold due to BRIDGETTE since 4/13)      + Pending DTA cultures  - RV dilated, PA pressure elevated, d-dimer elevated, possibility of PE    > Started on heparin therapeutic on 4/17    > DC plavix to decrease incidence of bleed, last stent placed >1 year ago    # BRIDGETTE   - Not known CKD, creatinine normal on admission   - Non-oliguric for now, receiving Lasix 40mg IV q12  - ATN? Drug induced? Ameliorating  - DC vancomycin on 4/13    # Ischemic heart disease  - On DAPT on admission - Held aspirin after starting therapeutic anticoagulation on 4/17  - Metoprolol 50mg bid discontinued on 4/17 for hemodynamic stability  - On lasix IV bid  - Echo done showed LVEF 66%, mod RV dysfunction, PASP 58mmHg.     # DVT prophylaxis > On heparin therapeutic

## 2020-04-21 NOTE — CHART NOTE - NSCHARTNOTEFT_GEN_A_CORE
Registered Dietitian Follow-Up     Patient Profile Reviewed                           Yes [x]   No []     Nutrition History Previously Obtained        Yes []  No [x]  remains intubated/sedated on  limited contact precautions d/t COVID19     Pertinent Medical Interventions: Supportive management of COVID19 per ID/pulmonology. Paralysis with nimbex d/c'd since previously assessed. Per nephro, no acute indication for RRT, on daily lasix.      Diet order: Active order is Vital HP total volume 1200 mL/d. Bolus rate of 300 mL q6hrs). Previous RD recommended Continuous rate of 30 mL/h (total volume 720 mL/d) EN running at 20 mL/h per EMR documentation. @ 20 mL/h this provides:  1227 kcal (includes 747 kcal from propofol), 42 g protein and 403 mL H2O.      Anthropometrics:  - Ht. 65"  - Wt. dosing 118 kg/260 lbs  - %wt change no new wt since previous assessment  - BMI 43.9  - IBW 56.8 kg/125 lbs     Pertinent Lab Data: (4/21) RBC 3.55, H/H 9.5/30.1, POC glucose 131-190. , MAP 64-85; (4/14) A1c 5.8     Pertinent Meds: heparin, meropenem, lantus, humalog, morphine, lasix, solu-medrol, levophed, folic acid, protonix, propofol (28.3 mL/h provides 747 kcal/d)     Physical Findings:  - Appearance: obese; (4/19) +3 generalized edema  - GI function: last BM 4/16, audible/active bowel sounds present; pt not currently on a bowel regimen and is on morphine which can cause constipation as a side effect, also on a non-fiber containing EN formula- pt would benefit from initiation of a bowel regimen  - Tubes: OGT  - Oral/Mouth cavity: NPO  - Skin: ecchymosis (4/19)     Nutrition Requirements  Weight Used: dosing 118 kg/260 lbs, IBW 56.8 kg/125 lbs (continued from previous assessment on 4/17 as needs remain adequate +/- 100 kcal using new Ve: 13.0, Tmax: 36.9--- [id number] = 2096 kcal)     Estimated Energy Needs    Continue [x] 1250 - 1500 kcals/day   1120 - 1306 kcals (60-70% [id number]) vs. 1250 - 1420 kcals (22-25 kcal/kg IBW) vs. 1298 - 1652 kcals (11-14 kcal/kg ABW)       Estimated Protein Needs    Continue [x] 114 - 125 gms/day (2.0 - 2.2 gm/kg IBW)     Estimated Fluid Needs        Continue [x] per LIP       Nutrient Intake: currently meeting 82-98% estimated energy requirements (considering propofol infusion) and 34-37% estimated protein requirements        [x] Previous Nutrition Diagnosis: Excessive energy intake             [x] Resolved     Nutrition Diagnostic #1  Problem: Inadequate protein/energy intake  Etiology: current EN regimen + propofol infusion  Statement: meeting 82-98% estimated energy requirements and 34-37% estimated protein requirements     Nutrition Intervention EN, medical food supplements, nutrition related medication management, coordination of care.     Goal/Expected Outcome: pt to meet % estimated protein/energy requirements within the next 3 days     Indicator/Monitoring: RD to monitor diet order, energy intake, body composition, NFPF, glucose/renal profiles    Recommendation: Adjust EN to the previously recommended EN regimen  Vital HP total volume 720 mL/d. Continuous rate of 30 mL/h. Add Prosource TF TID (additional 120 kcal, 33 g protein). At goal this will provide: 1587 kcal (includes 747 kcal from propofol), 96 g protein and 605 mL free H2O. Additional fluids per LIP. Maintain aspiration precautions, hold EN if MAP <65, monitor/replete electrolytes PRN, initiate bowel regimen. Registered Dietitian Follow-Up     Patient Profile Reviewed                           Yes [x]   No []     Nutrition History Previously Obtained        Yes []  No [x]  remains intubated/sedated on  limited contact precautions d/t COVID19     Pertinent Medical Interventions: Supportive management of COVID19 per ID/pulmonology. Paralysis with nimbex d/c'd since previously assessed. Per nephro, no acute indication for RRT, on daily lasix.      Diet order: Active order is Vital HP total volume 1200 mL/d. Bolus rate of 300 mL q6hrs). Previous RD recommended Continuous rate of 30 mL/h (total volume 720 mL/d) EN running at 20 mL/h per EMR documentation. @ 20 mL/h this provides:  1227 kcal (includes 747 kcal from propofol), 42 g protein and 403 mL H2O.      Anthropometrics:  - Ht. 65"  - Wt. dosing 118 kg/260 lbs  - %wt change no new wt since previous assessment  - BMI 43.9  - IBW 56.8 kg/125 lbs     Pertinent Lab Data: (4/21) RBC 3.55, H/H 9.5/30.1, POC glucose 131-190. , MAP 64-85; (4/14) A1c 5.8     Pertinent Meds: heparin, meropenem, lantus, humalog, morphine, lasix, solu-medrol, levophed, folic acid, protonix, propofol (28.3 mL/h provides 747 kcal/d)     Physical Findings:  - Appearance: obese; (4/19) +3 generalized edema  - GI function: last BM 4/16, audible/active bowel sounds present; pt not currently on a bowel regimen and is on morphine which can cause constipation as a side effect, also on a non-fiber containing EN formula- pt would benefit from initiation of a bowel regimen  - Tubes: OGT  - Oral/Mouth cavity: NPO  - Skin: ecchymosis (4/19)     Nutrition Requirements  Weight Used: dosing 118 kg/260 lbs, IBW 56.8 kg/125 lbs (continued from previous assessment on 4/17 as needs remain adequate +/- 100 kcal using new Ve: 13.0, Tmax: 36.9--- [id number] = 2096 kcal)     Estimated Energy Needs    Continue [x] 1250 - 1500 kcals/day   1120 - 1306 kcals (60-70% [id number]) vs. 1250 - 1420 kcals (22-25 kcal/kg IBW) vs. 1298 - 1652 kcals (11-14 kcal/kg ABW)       Estimated Protein Needs    Continue [x] 114 - 125 gms/day (2.0 - 2.2 gm/kg IBW)     Estimated Fluid Needs        Continue [x] per LIP       Nutrient Intake: currently meeting 82-98% estimated energy requirements (considering propofol infusion) and 34-37% estimated protein requirements        [x] Previous Nutrition Diagnosis: Excessive energy intake             [x] Resolved     Nutrition Diagnostic #1  Problem: Inadequate protein/energy intake  Etiology: current EN regimen + propofol infusion  Statement: meeting 82-98% estimated energy requirements and 34-37% estimated protein requirements     Nutrition Intervention EN, medical food supplements, nutrition related medication management, coordination of care.     Goal/Expected Outcome: pt to meet % estimated protein/energy requirements within the next 3 days     Indicator/Monitoring: RD to monitor diet order, energy intake, body composition, NFPF, glucose/renal profiles    Recommendation: Adjust EN to the previously recommended EN regimen  Vital HP total volume 720 mL/d. Continuous rate of 30 mL/h. Add Prosource TF TID (additional 120 kcal, 33 g protein). At goal this will provide: 1587 kcal (includes 747 kcal from propofol), 96 g protein and 605 mL free H2O. Additional fluids per LIP. Maintain aspiration precautions, hold EN if MAP <65, monitor/replete electrolytes PRN, initiate bowel regimen. Recs discussed with covering resident [phone]

## 2020-04-21 NOTE — PROGRESS NOTE ADULT - SUBJECTIVE AND OBJECTIVE BOX
Patient is a 70y old  Female who presents with a chief complaint of shortness of breath (20 Apr 2020 12:06)      Over Night Events:  Patient seen and examined still vented on prop and morphine       ROS:  See HPI    PHYSICAL EXAM    ICU Vital Signs Last 24 Hrs  T(C): 36.7 (21 Apr 2020 07:51), Max: 36.9 (20 Apr 2020 17:51)  T(F): 98 (21 Apr 2020 07:51), Max: 98.5 (20 Apr 2020 17:51)  HR: 96 (21 Apr 2020 07:51) (65 - 96)  BP: --  BP(mean): --  ABP: 132/58 (21 Apr 2020 07:51) (100/44 - 146/56)  ABP(mean): 86 (21 Apr 2020 07:51) (62 - 86)  RR: 24 (21 Apr 2020 07:51) (22 - 24)  SpO2: 91% (21 Apr 2020 07:51) (91% - 98%)      General: on sedation   HEENT: et tub e               Lymph Nodes: NO cervical LN   Lungs: Bilateral BS  Cardiovascular: Regular   Abdomen: Soft, Positive BS  Extremities: No clubbing   Skin: warm   Neurological:  on sedation   Musculoskeletal: move all ext     I&O's Detail    20 Apr 2020 07:01  -  21 Apr 2020 07:00  --------------------------------------------------------  IN:  Total IN: 0 mL    OUT:    Indwelling Catheter - Urethral: 1500 mL  Total OUT: 1500 mL    Total NET: -1500 mL          LABS:                          9.5    21.45 )-----------( 327      ( 21 Apr 2020 04:30 )             30.1         21 Apr 2020 04:30    143    |  94     |  126    ----------------------------<  81     4.1     |  29     |  1.5      Ca    9.3        21 Apr 2020 04:30  Mg     2.4       20 Apr 2020 04:10    TPro  6.4    /  Alb  3.5    /  TBili  0.5    /  DBili  x      /  AST  26     /  ALT  10     /  AlkPhos  46     21 Apr 2020 04:30  Amylase x     lipase x                                                 PTT - ( 21 Apr 2020 04:30 )  PTT:53.7 sec                                                                                                                                                Mode: AC/ CMV (Assist Control/ Continuous Mandatory Ventilation)  RR (machine): 24  FiO2: 60  PEEP: 13  PC: 28  PIP: 28                                      ABG - ( 21 Apr 2020 04:16 )  pH, Arterial: 7.50  pH, Blood: x     /  pCO2: 39    /  pO2: 65    / HCO3: 31    / Base Excess: 7.0   /  SaO2: 92                  MEDICATIONS  (STANDING):  artificial tears (preservative free) Ophthalmic Solution 1 Drop(s) Both EYES two times a day  aspirin  chewable 81 milliGRAM(s) Oral daily  chlorhexidine 0.12% Liquid 15 milliLiter(s) Oral Mucosa two times a day  chlorhexidine 4% Liquid 1 Application(s) Topical <User Schedule>  dextrose 5%. 1000 milliLiter(s) (50 mL/Hr) IV Continuous <Continuous>  dextrose 50% Injectable 12.5 Gram(s) IV Push once  dextrose 50% Injectable 25 Gram(s) IV Push once  dextrose 50% Injectable 25 Gram(s) IV Push once  folic acid 1 milliGRAM(s) Oral daily  furosemide   Injectable 40 milliGRAM(s) IV Push daily  heparin  Infusion 1900 Unit(s)/Hr (11 mL/Hr) IV Continuous <Continuous>  insulin glargine Injectable (LANTUS) 18 Unit(s) SubCutaneous at bedtime  insulin lispro (HumaLOG) corrective regimen sliding scale   SubCutaneous three times a day before meals  insulin lispro Injectable (HumaLOG) 6 Unit(s) SubCutaneous every 6 hours  meropenem  IVPB 500 milliGRAM(s) IV Intermittent every 12 hours  methylPREDNISolone sodium succinate Injectable 40 milliGRAM(s) IV Push daily  norepinephrine Infusion 0.05 MICROgram(s)/kG/Min (5.53 mL/Hr) IV Continuous <Continuous>  pantoprazole   Suspension 40 milliGRAM(s) Oral daily  propofol Infusion 20.056 MICROgram(s)/kG/Min (14.2 mL/Hr) IV Continuous <Continuous>    MEDICATIONS  (PRN):  dextrose 40% Gel 15 Gram(s) Oral once PRN Blood Glucose LESS THAN 70 milliGRAM(s)/deciliter  glucagon  Injectable 1 milliGRAM(s) IntraMuscular once PRN Glucose LESS THAN 70 milligrams/deciliter          Xrays:  TLC:  OG:  ET tube:                                                                                    b/l opacity    ECHO:  CAM ICU:

## 2020-04-21 NOTE — PROGRESS NOTE ADULT - ASSESSMENT
70y F w/ PMH of asthma (never hospitalized, never intubated), CAD s/p stent (07/2018), HTN, HLD, and DM (diet-controlled) presents from [facility] w/ the chief complaint of shortness of breath, fevers, chills for 1 week and has been progressively worsening.    Impression   ARF hypoxic   -r/o COVID  -Suspected GNR PNA  viral PNA secondary   -Asthma Exacerbation  ARDS  BRIDGETTE    Plan     1.CNS  sedation vacation to asses mental status   d/c nimbex   2. CVS     echo 66% moderate LVH , puln htn ?? rv dilatation       3. PULMONARY   lower rate to 20 and fio2 60   -lower solumedrol 40 Q 2 4 hrs     4. INFECTIOUS DISEASE     on HC  on angeli   follow ID    ANAKirna   follow ferritin ,crp d-dimer   follow id regarding remdisivir if of pressors   5. GI  -GI ppx, protonix  -once intubated, will start OG feeds  check LFT     6. RENAL  -replete electrolytes PRN  follow renal   on lasix Q 24 hrs recall renal    monitor Is and OS     7. Endocrine   -f/u FS, target FS <180    8. Hematology   start heparin IV for now keep ptt 60 elevated d-dimer Rv dilatation   PA 58  9. DVT PROPHYLAXIS  heparin drip follow ptt kepp 60 elevated d-dimner     11. Code Status   -full code

## 2020-04-21 NOTE — PROGRESS NOTE ADULT - SUBJECTIVE AND OBJECTIVE BOX
Subjective:  The patient is sedated, intubated, ventilated.     Objective:      artificial tears (preservative free) Ophthalmic Solution 1 Drop(s) Both EYES two times a day  aspirin  chewable 81 milliGRAM(s) Oral daily  chlorhexidine 0.12% Liquid 15 milliLiter(s) Oral Mucosa two times a day  chlorhexidine 4% Liquid 1 Application(s) Topical <User Schedule>  dextrose 40% Gel 15 Gram(s) Oral once PRN  dextrose 5%. 1000 milliLiter(s) IV Continuous <Continuous>  dextrose 50% Injectable 12.5 Gram(s) IV Push once  dextrose 50% Injectable 25 Gram(s) IV Push once  dextrose 50% Injectable 25 Gram(s) IV Push once  folic acid 1 milliGRAM(s) Oral daily  furosemide   Injectable 40 milliGRAM(s) IV Push daily  glucagon  Injectable 1 milliGRAM(s) IntraMuscular once PRN  heparin  Infusion 1900 Unit(s)/Hr IV Continuous <Continuous>  insulin glargine Injectable (LANTUS) 18 Unit(s) SubCutaneous at bedtime  insulin lispro (HumaLOG) corrective regimen sliding scale   SubCutaneous three times a day before meals  insulin lispro Injectable (HumaLOG) 6 Unit(s) SubCutaneous every 6 hours  meropenem  IVPB 500 milliGRAM(s) IV Intermittent every 12 hours  methylPREDNISolone sodium succinate Injectable 40 milliGRAM(s) IV Push daily  morphine  Infusion. 5 mG/Hr IV Continuous <Continuous>  norepinephrine Infusion 0.05 MICROgram(s)/kG/Min IV Continuous <Continuous>  pantoprazole   Suspension 40 milliGRAM(s) Oral daily  propofol Infusion 20.056 MICROgram(s)/kG/Min IV Continuous <Continuous>      PHYSICAL EXAM:  General: Sedated, intubated, ventilated  Cardiac: S1 S2 heard regular  Pulmonary: Audible bilateral crackles  Abdomen: Soft, +BS     T(C): 36.7 (04-21-20 @ 07:51), Max: 36.9 (04-20-20 @ 17:51)  HR: 90 (04-21-20 @ 08:37) (65 - 96)  BP: --  RR: 20 (04-21-20 @ 08:37) (20 - 24)  SpO2: 91% (04-21-20 @ 08:37) (91% - 98%)    LABS:                        9.5    21.45 )-----------( 327      ( 21 Apr 2020 04:30 )             30.1     04-21    143  |  94<L>  |  126<HH>  ----------------------------<  81  4.1   |  29  |  1.5    Ca    9.3      21 Apr 2020 04:30  Mg     2.4     04-20    TPro  6.4  /  Alb  3.5  /  TBili  0.5  /  DBili  x   /  AST  26  /  ALT  10  /  AlkPhos  46  04-21    PTT - ( 21 Apr 2020 04:30 )  PTT:53.7 sec  Alanine Aminotransferase (ALT/SGPT): 10 U/L (04-21-20 @ 04:30)  Aspartate Aminotransferase (AST/SGOT): 26 U/L (04-21-20 @ 04:30)

## 2020-04-22 NOTE — PROCEDURE NOTE - NSPROCDETAILS_GEN_ALL_CORE
guidewire recovered
lumen(s) aspirated and flushed/guidewire recovered/sterile dressing applied/sterile technique, catheter placed/ultrasound guidance
sterile technique, catheter placed/lumen(s) aspirated and flushed/guidewire recovered/ultrasound guidance/sterile dressing applied
ultrasound guidance/positive blood return obtained via catheter/location identified, draped/prepped, sterile technique used, needle inserted/introduced/connected to a pressurized flush line/Seldinger technique/all materials/supplies accounted for at end of procedure/hemostasis with direct pressure, dressing applied

## 2020-04-22 NOTE — PROCEDURE NOTE - NSPOSTPRCRAD_GEN_A_CORE
post-procedure radiography performed
depth of insertion/post-procedure radiography performed/no pneumothorax/central line located in the superior vena cava/line adjusted to depth of insertion
no pneumothorax/central line located in the superior vena cava/post-procedure radiography performed/central line located in the/depth of insertion/line adjusted to depth of insertion

## 2020-04-22 NOTE — PROGRESS NOTE ADULT - ASSESSMENT
70y F w/ PMH of asthma (never hospitalized, never intubated), CAD s/p stent (07/2018), HTN, HLD, and DM (diet-controlled) presents from [facility] w/ the chief complaint of shortness of breath, fevers, chills for 1 week and has been progressively worsening.      # Hypoxic respiratory failure  - Intubated, PCV I30/E15/R25/80%  - Etiology may include:     > Given pandemic > COVID - 19       + COVID-19 PCR +ve      + On HCQ hoping it would help      + Started Kineret on 4/16      + Solumedrol 60mg IV q12h > dropped to 40qd on 4/16  - Inflammatory biomarkers > CRP 24 (was 20),  (was 432), Ferritin 1599 (was 1111) >> after kineret >> CRP downtrending now 4.5, ferritin 1100    > Bacterial LRTI superinfection      + On meropenem and vancomycin (dose adjusted according to clerance) ( Vancomycin level 20, on hold due to BRIDGETTE since 4/13)      + Pending DTA cultures  - RV dilated, PA pressure elevated, d-dimer elevated, possibility of PE    > Started on heparin therapeutic on 4/17    > DC plavix to decrease incidence of bleed, last stent placed >1 year ago    # BRIDGETTE   - Not known CKD, creatinine normal on admission   - Non-oliguric for now, receiving Lasix 40mg IV qd  - ATN? Drug induced? Ameliorating  - DC vancomycin on 4/13  - Will follow with nephrology    # Ischemic heart disease  - On DAPT on admission - Held aspirin after starting therapeutic anticoagulation on 4/17  - Metoprolol 50mg bid discontinued on 4/17 for hemodynamic stability  - On lasix IV bid  - Echo done showed LVEF 66%, mod RV dysfunction, PASP 58mmHg.     # DVT prophylaxis > On heparin therapeutic

## 2020-04-22 NOTE — PROGRESS NOTE ADULT - ASSESSMENT
70y F w/ PMH of asthma (never hospitalized, never intubated), CAD s/p stent (07/2018), HTN, HLD, and DM (diet-controlled) presents from [facility] w/ the chief complaint of shortness of breath, fevers, chills for 1 week and has been progressively worsening.    Impression   ARF hypoxic   -r/o COVID  -Suspected GNR PNA  viral PNA secondary   -Asthma Exacerbation  ARDS  BRIDGETTE    Plan     1.CNS  sedation vacation to asses mental status   d/c nimbex     2. CVS     echo 66% moderate LVH , puln htn ?? rv dilatation       3. PULMONARY   keep samevent setting on V 60   -lower solumedrol 40 Q 2 4 hrs     4. INFECTIOUS DISEASE     on HC  on angeli   follow ID    ANAKirna   follow ferritin ,crp d-dimer repeat   follow id regarding remdisivir if of pressors     5. GI  -GI ppx, protonix  -once intubated,  OG feeds  check LFT     6. RENAL  -replete electrolytes PRN  follow renal   on lasix Q 24 hrs recall renal    monitor Is and OS     7. Endocrine   -f/u FS, target FS <180    8. Hematology   start heparin IV for now keep ptt 60 elevated d-dimer Rv dilatation   PA 58  9. DVT PROPHYLAXIS  heparin drip follow ptt kepp 60 elevated d-dimner     11. Code Status   -full code

## 2020-04-22 NOTE — PROGRESS NOTE ADULT - SUBJECTIVE AND OBJECTIVE BOX
NIRMAL DANIELS  70y, Female    All available historical data reviewed    OVERNIGHT EVENTS:  no fevers  fio2 60%    ROS:  unable to obtain history secondary to patient's mental status and/or sedation     VITALS:  T(F): 96.8, Max: 98 (04-22-20 @ 09:20)  HR: 69  BP: --  RR: 20Vital Signs Last 24 Hrs  T(C): 36 (22 Apr 2020 11:31), Max: 36.7 (22 Apr 2020 09:20)  T(F): 96.8 (22 Apr 2020 11:31), Max: 98 (22 Apr 2020 09:20)  HR: 69 (22 Apr 2020 13:50) (69 - 86)  BP: --  BP(mean): --  RR: 20 (22 Apr 2020 13:50) (20 - 20)  SpO2: 92% (22 Apr 2020 13:50) (92% - 95%)    TESTS & MEASUREMENTS:                        9.2    19.44 )-----------( 288      ( 22 Apr 2020 04:30 )             29.8     04-22    144  |  98  |  124<HH>  ----------------------------<  96  4.3   |  28  |  1.2    Ca    9.3      22 Apr 2020 04:30    TPro  6.0  /  Alb  3.3<L>  /  TBili  0.4  /  DBili  x   /  AST  29  /  ALT  14  /  AlkPhos  47  04-22    LIVER FUNCTIONS - ( 22 Apr 2020 04:30 )  Alb: 3.3 g/dL / Pro: 6.0 g/dL / ALK PHOS: 47 U/L / ALT: 14 U/L / AST: 29 U/L / GGT: x                   RADIOLOGY & ADDITIONAL TESTS:  Personal review of radiological diagnostics performed  Echo and EKG results noted when applicable.     MEDICATIONS:  artificial tears (preservative free) Ophthalmic Solution 1 Drop(s) Both EYES two times a day  aspirin  chewable 81 milliGRAM(s) Oral daily  chlorhexidine 0.12% Liquid 15 milliLiter(s) Oral Mucosa two times a day  chlorhexidine 4% Liquid 1 Application(s) Topical <User Schedule>  dextrose 40% Gel 15 Gram(s) Oral once PRN  dextrose 5%. 1000 milliLiter(s) IV Continuous <Continuous>  dextrose 50% Injectable 12.5 Gram(s) IV Push once  dextrose 50% Injectable 25 Gram(s) IV Push once  dextrose 50% Injectable 25 Gram(s) IV Push once  folic acid 1 milliGRAM(s) Oral daily  furosemide   Injectable 40 milliGRAM(s) IV Push daily  glucagon  Injectable 1 milliGRAM(s) IntraMuscular once PRN  heparin  Infusion 1900 Unit(s)/Hr IV Continuous <Continuous>  insulin glargine Injectable (LANTUS) 18 Unit(s) SubCutaneous at bedtime  insulin lispro (HumaLOG) corrective regimen sliding scale   SubCutaneous three times a day before meals  insulin lispro Injectable (HumaLOG) 6 Unit(s) SubCutaneous every 6 hours  meropenem  IVPB 1000 milliGRAM(s) IV Intermittent every 12 hours  methylPREDNISolone sodium succinate Injectable 40 milliGRAM(s) IV Push daily  morphine  Infusion. 4 mG/Hr IV Continuous <Continuous>  norepinephrine Infusion 0.05 MICROgram(s)/kG/Min IV Continuous <Continuous>  pantoprazole   Suspension 40 milliGRAM(s) Oral daily  polyethylene glycol 3350 17 Gram(s) Oral two times a day  propofol Infusion 20.056 MICROgram(s)/kG/Min IV Continuous <Continuous>  senna 2 Tablet(s) Oral at bedtime      ANTIBIOTICS:  meropenem  IVPB 1000 milliGRAM(s) IV Intermittent every 12 hours

## 2020-04-22 NOTE — PROCEDURE NOTE - NSPOSTCAREGUIDE_GEN_A_CORE
Verbal/written post procedure instructions were given to patient/caregiver
Instructed patient/caregiver regarding signs and symptoms of infection/Keep the cast/splint/dressing clean and dry/Instructed patient/caregiver to follow-up with primary care physician/Care for catheter as per unit/ICU protocols/Verbal/written post procedure instructions were given to patient/caregiver
Instructed patient/caregiver regarding signs and symptoms of infection/Keep the cast/splint/dressing clean and dry/Verbal/written post procedure instructions were given to patient/caregiver/Instructed patient/caregiver to follow-up with primary care physician/Care for catheter as per unit/ICU protocols
Keep the cast/splint/dressing clean and dry/Verbal/written post procedure instructions were given to patient/caregiver/Instructed patient/caregiver regarding signs and symptoms of infection/Instructed patient/caregiver to follow-up with primary care physician/Care for catheter as per unit/ICU protocols

## 2020-04-22 NOTE — PROCEDURE NOTE - NSINDICATIONS_GEN_A_CORE
critical illness
cannulation purposes/arterial puncture to obtain ABG's
critical illness/emergency venous access
hypertonic/irritant infusion/hemodynamic monitoring/critical illness

## 2020-04-22 NOTE — PROGRESS NOTE ADULT - SUBJECTIVE AND OBJECTIVE BOX
Blue Hill Hospital Of  [id number]  [place] [place] [facility] [address]   Patient is a 70y old  Female who presents with a chief complaint of shortness of breath (21 Apr 2020 10:53)      Over Night Events:  Patient seen and examined.   still vented on sedation tlc changed     ROS:  See HPI    PHYSICAL EXAM    ICU Vital Signs Last 24 Hrs  T(C): 36.4 (22 Apr 2020 06:35), Max: 37.1 (21 Apr 2020 15:15)  T(F): 97.6 (22 Apr 2020 06:35), Max: 98.7 (21 Apr 2020 15:15)  HR: 80 (22 Apr 2020 08:04) (73 - 90)  BP: --  BP(mean): --  ABP: 123/49 (22 Apr 2020 08:04) (103/53 - 145/63)  ABP(mean): 73 (22 Apr 2020 08:04) (67 - 92)  RR: 20 (22 Apr 2020 08:04) (20 - 20)  SpO2: 92% (22 Apr 2020 08:04) (91% - 95%)      General: on sedation   HEENT:    et tube             Lymph Nodes: NO cervical LN   Lungs: Bilateral BS  Cardiovascular: Regular   Abdomen: Soft, Positive BS  Extremities: No clubbing   Skin: warm   Neurological:  on sedation   Musculoskeletal: move all ext     I&O's Detail    21 Apr 2020 07:01  -  22 Apr 2020 07:00  --------------------------------------------------------  IN:    Vital High Protein: 60 mL  Total IN: 60 mL    OUT:    Indwelling Catheter - Urethral: 2450 mL  Total OUT: 2450 mL    Total NET: -2390 mL      22 Apr 2020 07:01  -  22 Apr 2020 08:31  --------------------------------------------------------  IN:  Total IN: 0 mL    OUT:    Indwelling Catheter - Urethral: 400 mL  Total OUT: 400 mL    Total NET: -400 mL          LABS:                          9.2    19.44 )-----------( 288      ( 22 Apr 2020 04:30 )             29.8         22 Apr 2020 04:30    144    |  98     |  124    ----------------------------<  96     4.3     |  28     |  1.2      Ca    9.3        22 Apr 2020 04:30    TPro  6.0    /  Alb  3.3    /  TBili  0.4    /  DBili  x      /  AST  29     /  ALT  14     /  AlkPhos  47     22 Apr 2020 04:30  Amylase x     lipase x                                                 PTT - ( 22 Apr 2020 01:20 )  PTT:61.7 sec                                                                                                                                                                                    ABG - ( 22 Apr 2020 03:16 )  pH, Arterial: 7.41  pH, Blood: x     /  pCO2: 52    /  pO2: 63    / HCO3: 33    / Base Excess: 7.3   /  SaO2: 90      28/13 20 60fio2            MEDICATIONS  (STANDING):  artificial tears (preservative free) Ophthalmic Solution 1 Drop(s) Both EYES two times a day  aspirin  chewable 81 milliGRAM(s) Oral daily  chlorhexidine 0.12% Liquid 15 milliLiter(s) Oral Mucosa two times a day  chlorhexidine 4% Liquid 1 Application(s) Topical <User Schedule>  dextrose 5%. 1000 milliLiter(s) (50 mL/Hr) IV Continuous <Continuous>  dextrose 50% Injectable 12.5 Gram(s) IV Push once  dextrose 50% Injectable 25 Gram(s) IV Push once  dextrose 50% Injectable 25 Gram(s) IV Push once  folic acid 1 milliGRAM(s) Oral daily  furosemide   Injectable 40 milliGRAM(s) IV Push daily  heparin  Infusion 1900 Unit(s)/Hr (11 mL/Hr) IV Continuous <Continuous>  insulin glargine Injectable (LANTUS) 18 Unit(s) SubCutaneous at bedtime  insulin lispro (HumaLOG) corrective regimen sliding scale   SubCutaneous three times a day before meals  insulin lispro Injectable (HumaLOG) 6 Unit(s) SubCutaneous every 6 hours  meropenem  IVPB 1000 milliGRAM(s) IV Intermittent every 12 hours  methylPREDNISolone sodium succinate Injectable 40 milliGRAM(s) IV Push daily  morphine  Infusion. 4 mG/Hr (4 mL/Hr) IV Continuous <Continuous>  norepinephrine Infusion 0.05 MICROgram(s)/kG/Min (5.53 mL/Hr) IV Continuous <Continuous>  pantoprazole   Suspension 40 milliGRAM(s) Oral daily  polyethylene glycol 3350 17 Gram(s) Oral two times a day  propofol Infusion 20.056 MICROgram(s)/kG/Min (14.2 mL/Hr) IV Continuous <Continuous>  senna 2 Tablet(s) Oral at bedtime  vancomycin  IVPB 1000 milliGRAM(s) IV Intermittent every 12 hours    MEDICATIONS  (PRN):  dextrose 40% Gel 15 Gram(s) Oral once PRN Blood Glucose LESS THAN 70 milliGRAM(s)/deciliter  glucagon  Injectable 1 milliGRAM(s) IntraMuscular once PRN Glucose LESS THAN 70 milligrams/deciliter          Xrays:  TLC:  OG:  ET tube:   deep                                                                                  b/l opacity    ECHO:  [facility] ICU: 

## 2020-04-22 NOTE — PROCEDURE NOTE - NSICDXPROCEDURE_GEN_ALL_CORE_FT
PROCEDURES:  Central venous catheter placement with ultrasound guidance 16-Apr-2020 09:45:42  Maicol Bhagat
PROCEDURES:  Central venous catheter placement with ultrasound guidance 16-Apr-2020 09:45:42  Maicol Bhagat

## 2020-04-22 NOTE — PROGRESS NOTE ADULT - SUBJECTIVE AND OBJECTIVE BOX
Subjective:  The patient is sedated, intubated, ventilated.     Objective:      artificial tears (preservative free) Ophthalmic Solution 1 Drop(s) Both EYES two times a day  aspirin  chewable 81 milliGRAM(s) Oral daily  chlorhexidine 0.12% Liquid 15 milliLiter(s) Oral Mucosa two times a day  chlorhexidine 4% Liquid 1 Application(s) Topical <User Schedule>  dextrose 40% Gel 15 Gram(s) Oral once PRN  dextrose 5%. 1000 milliLiter(s) IV Continuous <Continuous>  dextrose 50% Injectable 12.5 Gram(s) IV Push once  dextrose 50% Injectable 25 Gram(s) IV Push once  dextrose 50% Injectable 25 Gram(s) IV Push once  folic acid 1 milliGRAM(s) Oral daily  furosemide   Injectable 40 milliGRAM(s) IV Push daily  glucagon  Injectable 1 milliGRAM(s) IntraMuscular once PRN  heparin  Infusion 1900 Unit(s)/Hr IV Continuous <Continuous>  insulin glargine Injectable (LANTUS) 18 Unit(s) SubCutaneous at bedtime  insulin lispro (HumaLOG) corrective regimen sliding scale   SubCutaneous three times a day before meals  insulin lispro Injectable (HumaLOG) 6 Unit(s) SubCutaneous every 6 hours  meropenem  IVPB 1000 milliGRAM(s) IV Intermittent every 12 hours  methylPREDNISolone sodium succinate Injectable 40 milliGRAM(s) IV Push daily  morphine  Infusion. 4 mG/Hr IV Continuous <Continuous>  norepinephrine Infusion 0.05 MICROgram(s)/kG/Min IV Continuous <Continuous>  pantoprazole   Suspension 40 milliGRAM(s) Oral daily  polyethylene glycol 3350 17 Gram(s) Oral two times a day  propofol Infusion 20.056 MICROgram(s)/kG/Min IV Continuous <Continuous>  senna 2 Tablet(s) Oral at bedtime      PHYSICAL EXAM:  General: Sedated, intubated, ventilated  Cardiac: S1 S2 heard regular  Pulmonary: Audible bilateral crackles  Abdomen: Soft, +BS     T(C): 36 (04-22-20 @ 11:31), Max: 37.1 (04-21-20 @ 15:15)  HR: 86 (04-22-20 @ 11:31) (73 - 88)  BP: --  RR: 20 (04-22-20 @ 11:31) (20 - 20)  SpO2: 92% (04-22-20 @ 11:31) (92% - 95%)    LABS:                        9.2    19.44 )-----------( 288      ( 22 Apr 2020 04:30 )             29.8     04-22    144  |  98  |  124<HH>  ----------------------------<  96  4.3   |  28  |  1.2    Ca    9.3      22 Apr 2020 04:30    TPro  6.0  /  Alb  3.3<L>  /  TBili  0.4  /  DBili  x   /  AST  29  /  ALT  14  /  AlkPhos  47  04-22    PTT - ( 22 Apr 2020 01:20 )  PTT:61.7 sec  Aspartate Aminotransferase (AST/SGOT): 29 U/L (04-22-20 @ 04:30)  Alanine Aminotransferase (ALT/SGPT): 14 U/L (04-22-20 @ 04:30)

## 2020-04-22 NOTE — PROGRESS NOTE ADULT - ASSESSMENT
· Assessment		  70y F w/ PMH of asthma (never hospitalized, never intubated), CAD s/p stent (07/2018), HTN, HLD, and DM (diet-controlled) presents from [facility] w/ the chief complaint of shortness of breath, fevers, chills for 1 week and has been progressively worsening.    IMPRESSION;   COVID19 with septic shock requiring pressors, resp failure, mechanical ventilation with ARDS with FiO2 80 %, secondary to Cytokine Release Syndrome  -inflammatory markers elevated  -elevated Ddimer is a poor prognostic indicator and differentiate survivors from non survivors  -procalcitonin of 0.65 > 0.47 suggestive of a bacterial PNA/infection   -has been on azithro/ merem in NH since 4/6  -BCx NG  s/p Anakinra    RECOMMENDATIONS;  nares ORSA  sputa cultures if possible  S/p PLAQUENIL   s/p Anakinra  4/15-18  d/c Solumedrol   Meropenem 1 gm iv q12h  poor prognosis  anticoagulation  NO NEED TO REPEAT INFLAMMATORY MARKERS

## 2020-04-22 NOTE — PHARMACOTHERAPY INTERVENTION NOTE - COMMENTS
As per PK calculations : Bolus trough 19.05                                     Bolus Peak 28.70
Approved by ID
I spoke with Dr Alfredo and recommended to adjust dose from tapering regimen to 100 mg q6h x12 doses

## 2020-04-23 NOTE — PROGRESS NOTE ADULT - SUBJECTIVE AND OBJECTIVE BOX
NIRMAL DANIELS  70y, Female    All available historical data reviewed    OVERNIGHT EVENTS:  fio2 80%  no fevers    ROS:  unable to obtain history secondary to patient's mental status and/or sedation     VITALS:  T(F): 98, Max: 98 (04-23-20 @ 08:00)  HR: 74  BP: --  RR: 20Vital Signs Last 24 Hrs  T(C): 36.7 (23 Apr 2020 08:00), Max: 36.7 (23 Apr 2020 08:00)  T(F): 98 (23 Apr 2020 08:00), Max: 98 (23 Apr 2020 08:00)  HR: 74 (23 Apr 2020 13:00) (70 - 123)  BP: --  BP(mean): --  RR: 20 (23 Apr 2020 13:00) (20 - 20)  SpO2: 95% (23 Apr 2020 13:00) (90% - 99%)    TESTS & MEASUREMENTS:                        10.2   21.60 )-----------( 333      ( 23 Apr 2020 04:20 )             32.0     04-23    146  |  97<L>  |  120<HH>  ----------------------------<  78  4.6   |  29  |  1.1    Ca    10.4<H>      23 Apr 2020 04:20    TPro  6.8  /  Alb  3.7  /  TBili  0.5  /  DBili  x   /  AST  32  /  ALT  15  /  AlkPhos  60  04-23    LIVER FUNCTIONS - ( 23 Apr 2020 04:20 )  Alb: 3.7 g/dL / Pro: 6.8 g/dL / ALK PHOS: 60 U/L / ALT: 15 U/L / AST: 32 U/L / GGT: x                   RADIOLOGY & ADDITIONAL TESTS:  Personal review of radiological diagnostics performed  Echo and EKG results noted when applicable.     MEDICATIONS:  artificial tears (preservative free) Ophthalmic Solution 1 Drop(s) Both EYES two times a day  aspirin  chewable 81 milliGRAM(s) Oral daily  chlorhexidine 0.12% Liquid 15 milliLiter(s) Oral Mucosa two times a day  chlorhexidine 4% Liquid 1 Application(s) Topical <User Schedule>  dextrose 40% Gel 15 Gram(s) Oral once PRN  dextrose 5%. 1000 milliLiter(s) IV Continuous <Continuous>  dextrose 50% Injectable 12.5 Gram(s) IV Push once  dextrose 50% Injectable 25 Gram(s) IV Push once  dextrose 50% Injectable 25 Gram(s) IV Push once  fentaNYL   Infusion. 0.5 MICROgram(s)/kG/Hr IV Continuous <Continuous>  folic acid 1 milliGRAM(s) Oral daily  furosemide   Injectable 40 milliGRAM(s) IV Push daily  glucagon  Injectable 1 milliGRAM(s) IntraMuscular once PRN  heparin  Infusion 1900 Unit(s)/Hr IV Continuous <Continuous>  insulin glargine Injectable (LANTUS) 18 Unit(s) SubCutaneous at bedtime  insulin lispro (HumaLOG) corrective regimen sliding scale   SubCutaneous three times a day before meals  insulin lispro Injectable (HumaLOG) 6 Unit(s) SubCutaneous every 6 hours  meropenem  IVPB 1000 milliGRAM(s) IV Intermittent every 12 hours  norepinephrine Infusion 0.05 MICROgram(s)/kG/Min IV Continuous <Continuous>  pantoprazole   Suspension 40 milliGRAM(s) Oral daily  polyethylene glycol 3350 17 Gram(s) Oral two times a day  propofol Infusion 20.056 MICROgram(s)/kG/Min IV Continuous <Continuous>  senna 2 Tablet(s) Oral at bedtime  vasopressin Infusion 0.02 Unit(s)/Min IV Continuous <Continuous>      ANTIBIOTICS:  meropenem  IVPB 1000 milliGRAM(s) IV Intermittent every 12 hours

## 2020-04-23 NOTE — PROGRESS NOTE ADULT - SUBJECTIVE AND OBJECTIVE BOX
Patient is a 70y old  Female who presents with a chief complaint of shortness of breath (22 Apr 2020 16:14)      Over Night Events:  Patient seen and examined.   still vented had episode of afib over night NSR     ROS:  See HPI    PHYSICAL EXAM    ICU Vital Signs Last 24 Hrs  T(C): 36.6 (23 Apr 2020 03:01), Max: 36.7 (22 Apr 2020 09:20)  T(F): 97.8 (23 Apr 2020 03:01), Max: 98 (22 Apr 2020 09:20)  HR: 103 (23 Apr 2020 07:00) (69 - 123)  BP: --  BP(mean): --  ABP: 129/66 (23 Apr 2020 07:00) (60/30 - 190/60)  ABP(mean): 87 (23 Apr 2020 07:00) (61 - 103)  RR: 20 (23 Apr 2020 07:00) (20 - 20)  SpO2: 94% (23 Apr 2020 07:00) (90% - 99%)      General: on sedation   HEENT:     et tube            Lymph Nodes: NO cervical LN   Lungs: Bilateral BS  Cardiovascular: Regular   Abdomen: Soft, Positive BS  Extremities: No clubbing   Skin: warm   Neurological: on gag   Musculoskeletal: move all ext     I&O's Detail    22 Apr 2020 07:01  -  23 Apr 2020 07:00  --------------------------------------------------------  IN:    Vital High Protein: 390 mL  Total IN: 390 mL    OUT:    Indwelling Catheter - Urethral: 2400 mL    Voided: 375 mL  Total OUT: 2775 mL    Total NET: -2385 mL          LABS:                          10.2   21.60 )-----------( 333      ( 23 Apr 2020 04:20 )             32.0         23 Apr 2020 04:20    146    |  97     |  120    ----------------------------<  78     4.6     |  29     |  1.1      Ca    10.4       23 Apr 2020 04:20    TPro  6.8    /  Alb  3.7    /  TBili  0.5    /  DBili  x      /  AST  32     /  ALT  15     /  AlkPhos  60     23 Apr 2020 04:20  Amylase x     lipase x                                                 PTT - ( 23 Apr 2020 04:20 )  PTT:58.9 sec                                                                                                                                                                                    ABG - ( 23 Apr 2020 03:01 )  pH, Arterial: 7.37  pH, Blood: x     /  pCO2: 53    /  pO2: 62    / HCO3: 31    / Base Excess: 4.7   /  SaO2: 89        28/12/20/80          MEDICATIONS  (STANDING):  artificial tears (preservative free) Ophthalmic Solution 1 Drop(s) Both EYES two times a day  aspirin  chewable 81 milliGRAM(s) Oral daily  chlorhexidine 0.12% Liquid 15 milliLiter(s) Oral Mucosa two times a day  chlorhexidine 4% Liquid 1 Application(s) Topical <User Schedule>  dextrose 5%. 1000 milliLiter(s) (50 mL/Hr) IV Continuous <Continuous>  dextrose 50% Injectable 12.5 Gram(s) IV Push once  dextrose 50% Injectable 25 Gram(s) IV Push once  dextrose 50% Injectable 25 Gram(s) IV Push once  folic acid 1 milliGRAM(s) Oral daily  furosemide   Injectable 40 milliGRAM(s) IV Push daily  heparin  Infusion 1900 Unit(s)/Hr (11 mL/Hr) IV Continuous <Continuous>  insulin glargine Injectable (LANTUS) 18 Unit(s) SubCutaneous at bedtime  insulin lispro (HumaLOG) corrective regimen sliding scale   SubCutaneous three times a day before meals  insulin lispro Injectable (HumaLOG) 6 Unit(s) SubCutaneous every 6 hours  meropenem  IVPB 1000 milliGRAM(s) IV Intermittent every 12 hours  methylPREDNISolone sodium succinate Injectable 40 milliGRAM(s) IV Push daily  morphine  Infusion. 4 mG/Hr (4 mL/Hr) IV Continuous <Continuous>  norepinephrine Infusion 0.05 MICROgram(s)/kG/Min (5.53 mL/Hr) IV Continuous <Continuous>  pantoprazole   Suspension 40 milliGRAM(s) Oral daily  polyethylene glycol 3350 17 Gram(s) Oral two times a day  propofol Infusion 20.056 MICROgram(s)/kG/Min (14.2 mL/Hr) IV Continuous <Continuous>  senna 2 Tablet(s) Oral at bedtime  vasopressin Infusion 0.02 Unit(s)/Min (1.2 mL/Hr) IV Continuous <Continuous>    MEDICATIONS  (PRN):  dextrose 40% Gel 15 Gram(s) Oral once PRN Blood Glucose LESS THAN 70 milliGRAM(s)/deciliter  glucagon  Injectable 1 milliGRAM(s) IntraMuscular once PRN Glucose LESS THAN 70 milligrams/deciliter          Xrays:  TLC:  OG:  ET tube:                                                                                    b/l opacity    ECHO:  [facility] ICU:

## 2020-04-23 NOTE — PROGRESS NOTE ADULT - ASSESSMENT
70y F w/ PMH of asthma (never hospitalized, never intubated), CAD s/p stent (07/2018), HTN, HLD, and DM (diet-controlled) presents from [facility] w/ the chief complaint of shortness of breath, fevers, chills for 1 week and has been progressively worsening.    Impression   ARF hypoxic   -r/o COVID  -Suspected GNR PNA  viral PNA secondary   -Asthma Exacerbation  ARDS  BRIDGETTE    Plan     1.CNS  sedation vacation to asses mental status    add fentanyl d/c morphine     2. CVS     echo 66% moderate LVH , puln htn ?? rv dilatation   cardiology consult for vtach and afib ??   check mg     3. PULMONARY   lower fio 2 to 765  pressure 30/12   d/c solumedrol     4. INFECTIOUS DISEASE     on HC  on angeli   follow ID    ANAKirna   follow ferritin ,crp d-dimer repeat   follow id regarding remdisivir if of pressors     5. GI  -GI ppx, protonix  -once intubated,  OG feeds  check LFT     6. RENAL  -replete electrolytes PRN  follow renal   on lasix Q 24 hrs    monitor Is and OS     7. Endocrine   -f/u FS, target FS <180    8. Hematology   start heparin IV for now keep ptt 60 elevated d-dimer Rv dilatation   PA 58  9. DVT PROPHYLAXIS  heparin drip follow ptt kepp 60 elevated d-dimner     11. Code Status   -full code

## 2020-04-23 NOTE — CHART NOTE - NSCHARTNOTEFT_GEN_A_CORE
Pt had tachycardia to the 110s-140s with -200. 12 lead EKG c/w Afib with RVR with RBBB.     Plan:  - Start cardizem 15mg IV push. If no improvement w/in 15 minutes, repeat push. If still in Afib w rvr, start cardizem drip.   - Start Vaso, titrate Levo down  - Cont AC with Heparin drip (currently therapeutic) Pt had tachycardia to the 105-125s with -140. 12 lead EKG c/w Afib with RVR with RBBB.     Plan:  - Start Vaso, titrate Levo down if pt tolerates  - Consider amio of HR >140s  - Cont AC with Heparin drip (currently therapeutic)

## 2020-04-23 NOTE — CONSULT NOTE ADULT - ASSESSMENT
71 YO F with PMH of asthma (never hospitalized, never intubated), CAD s/p MI s/p PCI of prox LAD DESx1 (06/2017), HTN, HLD, and DM (diet-controlled) presents from [facility] w/ the chief complaint of shortness of breath, fevers, chills for 1 week and has been progressively worsening. Pt. admitted for septic shock and acute hypoxic respiratory failure secondary to COVID-19 PNA, suspected GNR PNA s/p intubation and mechanical ventilation complicated by ARDS / Cytokine Release storm, BRIDGETTE, possible PE, and and episode of Paroxysmal A.Fib.  Pt. being seen for an episode of Paroxysmal A.Fib from which pt. spontaneously converted to NSR. On pressors and mechanical ventilatory support.    Impression:    CAD s/p MI s/p PCI of prox LAD DESx1 (06/2017)  Paroxysmal A.Fib ([id number] of 5), with spontaneous conversion to NSR  Septic Shock secondary ot COVID-19 PNA, suspected GNR PNA on pressors  Acute hypoxic respiratory failure secondary ot COVID-19 PNA, ARDS / Cytokine release s/p intubation and mechanical ventilation  Suspected VTE on AC    Recommend:  - Wean off pressors as tolerated  - continue with ASA 81mg, and anticoagulation for suspected VTE and Paroxysmal A.Fib  - monitor K, and Mg, keep K >4, and Mg >2  - treatment of underlying septic shock, ARDS, COVID-19 PNA, and AHRF as per CC  - no further cardiac workup indicated at this time

## 2020-04-23 NOTE — PROGRESS NOTE ADULT - ASSESSMENT
70y F w/ PMH of asthma (never hospitalized, never intubated), CAD s/p stent (07/2018), HTN, HLD, and DM (diet-controlled) presents from [facility] w/ the chief complaint of shortness of breath, fevers, chills for 1 week and has been progressively worsening.      # Hypoxic respiratory failure  - Intubated  - Chest X-ray not ameliorating, very high suspicion of Left lower lobe complete atelectasis/collapse? and white right lung.  - Etiology may include:     > Given pandemic > COVID - 19       + COVID-19 PCR +ve      + On HCQ hoping it would help      + Started Kineret on 4/16      + Solumedrol 60mg IV q12h > dropped to 40qd on 4/16  - Inflammatory biomarkers > CRP 24 (was 20),  (was 432), Ferritin 1599 (was 1111) >> after kineret >> CRP downtrending now 4.5, ferritin 1100    > Bacterial LRTI superinfection      + On meropenem and vancomycin (dose adjusted according to tc) ( Vancomycin level 20, on hold due to BRIDGETTE since 4/13)      + Pending DTA cultures  - RV dilated, PA pressure elevated, d-dimer elevated, possibility of PE    > Started on heparin therapeutic on 4/17    > DC plavix to decrease incidence of bleed, last stent placed >1 year ago    # Afib  - transient episode of atrial fibrillation on morning of 4/23 > self resolved.    # BRIDGETTE   - Not known CKD, creatinine normal on admission   - Non-oliguric for now, receiving Lasix 40mg IV qd  - ATN? Drug induced? Ameliorating  - DC vancomycin on 4/13  - Will follow with nephrology    # Ischemic heart disease  - On DAPT on admission - Held aspirin after starting therapeutic anticoagulation on 4/17  - Metoprolol 50mg bid discontinued on 4/17 for hemodynamic stability  - On lasix IV bid  - Echo done showed LVEF 66%, mod RV dysfunction, PASP 58mmHg.     # DVT prophylaxis > On heparin therapeutic

## 2020-04-23 NOTE — CHART NOTE - NSCHARTNOTEFT_GEN_A_CORE
Spoke with Adolfo Herrera at [phone] updated him on patient's  status.   Answered questions, addressed concerns.

## 2020-04-23 NOTE — PROGRESS NOTE ADULT - ASSESSMENT
· Assessment		  70y F w/ PMH of asthma (never hospitalized, never intubated), CAD s/p stent (07/2018), HTN, HLD, and DM (diet-controlled) presents from [facility] w/ the chief complaint of shortness of breath, fevers, chills for 1 week and has been progressively worsening.    IMPRESSION;   COVID19 with septic shock requiring pressors, resp failure, mechanical ventilation with ARDS with FiO2 80 %, secondary to Cytokine Release Syndrome  -inflammatory markers elevated  -elevated Ddimer is a poor prognostic indicator and differentiate survivors from non survivors  -procalcitonin of 0.65 > 0.47 suggestive of a bacterial PNA/infection   -was on azithro/ merem in NH   -BCx NG  s/p Anakinra    RECOMMENDATIONS;  fidel PA  S/p PLAQUENIL   s/p Anakinra  4/15-18  d/c Solumedrol   Meropenem 1 gm iv q12h. Could hold for now  poor prognosis  anticoagulation  NO NEED TO REPEAT INFLAMMATORY MARKERS

## 2020-04-23 NOTE — PROGRESS NOTE ADULT - SUBJECTIVE AND OBJECTIVE BOX
Subjective:  The patient is sedated, intubated, ventilated.     Objective:      artificial tears (preservative free) Ophthalmic Solution 1 Drop(s) Both EYES two times a day  aspirin  chewable 81 milliGRAM(s) Oral daily  chlorhexidine 0.12% Liquid 15 milliLiter(s) Oral Mucosa two times a day  chlorhexidine 4% Liquid 1 Application(s) Topical <User Schedule>  dextrose 40% Gel 15 Gram(s) Oral once PRN  dextrose 5%. 1000 milliLiter(s) IV Continuous <Continuous>  dextrose 50% Injectable 12.5 Gram(s) IV Push once  dextrose 50% Injectable 25 Gram(s) IV Push once  dextrose 50% Injectable 25 Gram(s) IV Push once  fentaNYL   Infusion. 0.5 MICROgram(s)/kG/Hr IV Continuous <Continuous>  folic acid 1 milliGRAM(s) Oral daily  furosemide   Injectable 40 milliGRAM(s) IV Push daily  glucagon  Injectable 1 milliGRAM(s) IntraMuscular once PRN  heparin  Infusion 1900 Unit(s)/Hr IV Continuous <Continuous>  insulin glargine Injectable (LANTUS) 18 Unit(s) SubCutaneous at bedtime  insulin lispro (HumaLOG) corrective regimen sliding scale   SubCutaneous three times a day before meals  insulin lispro Injectable (HumaLOG) 6 Unit(s) SubCutaneous every 6 hours  meropenem  IVPB 1000 milliGRAM(s) IV Intermittent every 12 hours  norepinephrine Infusion 0.05 MICROgram(s)/kG/Min IV Continuous <Continuous>  pantoprazole   Suspension 40 milliGRAM(s) Oral daily  polyethylene glycol 3350 17 Gram(s) Oral two times a day  propofol Infusion 20.056 MICROgram(s)/kG/Min IV Continuous <Continuous>  senna 2 Tablet(s) Oral at bedtime  vasopressin Infusion 0.02 Unit(s)/Min IV Continuous <Continuous>      PHYSICAL EXAM:  General: Sedated, intubated, ventilated  Cardiac: S1 S2 heard regular  Pulmonary: Audible bilateral crackles  Abdomen: Soft, +BS     T(C): 36.7 (04-23-20 @ 08:00), Max: 36.7 (04-23-20 @ 08:00)  HR: 80 (04-23-20 @ 10:00) (69 - 123)  BP: --  RR: 20 (04-23-20 @ 10:00) (20 - 20)  SpO2: 95% (04-23-20 @ 10:00) (90% - 99%)    LABS:                        10.2   21.60 )-----------( 333      ( 23 Apr 2020 04:20 )             32.0     04-23    146  |  97<L>  |  120<HH>  ----------------------------<  78  4.6   |  29  |  1.1    Ca    10.4<H>      23 Apr 2020 04:20    TPro  6.8  /  Alb  3.7  /  TBili  0.5  /  DBili  x   /  AST  32  /  ALT  15  /  AlkPhos  60  04-23    PTT - ( 23 Apr 2020 04:20 )  PTT:58.9 sec  Alanine Aminotransferase (ALT/SGPT): 15 U/L (04-23-20 @ 04:20)  Aspartate Aminotransferase (AST/SGOT): 32 U/L (04-23-20 @ 04:20)

## 2020-04-23 NOTE — CONSULT NOTE ADULT - SUBJECTIVE AND OBJECTIVE BOX
Date of Admission: 4/13    CHIEF COMPLAINT: sob    HISTORY OF PRESENT ILLNESS:   69 YO F with PMH of asthma (never hospitalized, never intubated), CAD s/p MI s/p PCI of prox LAD (06/2017), HTN, HLD, and DM (diet-controlled) presents from [facility] w/ the chief complaint of shortness of breath, fevers, chills for 1 week and has been progressively worsening. Pt. admitted     PAST MEDICAL & SURGICAL HISTORY:  Depression  Anxiety  Dyslipidemia  Diabetes mellitus  Hypertension      FAMILY HISTORY:  [x] no pertinent family history of premature cardiovascular disease in first degree relatives.  Mother:   Father:   Siblings:     SOCIAL HISTORY:    [ ] Non-smoker  [ ] Smoker  [ ] Alcohol    Allergies    No Known Allergies    Intolerances    	    REVIEW OF SYSTEMS:  pt. intubated, sedated unable to obtain ROS    PHYSICAL EXAM:  T(C): 36.7 (04-23-20 @ 21:00), Max: 36.9 (04-23-20 @ 12:00)  HR: 74 (04-23-20 @ 21:00) (67 - 123)  BP: --  RR: 20 (04-23-20 @ 21:00) (20 - 20)  SpO2: 95% (04-23-20 @ 21:00) (92% - 99%)  Wt(kg): --  I&O's Summary    22 Apr 2020 07:01  -  23 Apr 2020 07:00  --------------------------------------------------------  IN: 390 mL / OUT: 2775 mL / NET: -2385 mL    23 Apr 2020 07:01  -  23 Apr 2020 22:29  --------------------------------------------------------  IN: 1315.1 mL / OUT: 1875 mL / NET: -559.9 mL        General Appearance: well appearing, normal for age and gender. 	  Neck: normal JVP, no bruit.   Eyes: No xanthelasma, Extra ocular muscles intact.   Cardiovascular: regular rate and rhythm S1 S2, No JVD, No murmurs, No edema  Respiratory: Lungs clear to auscultation	  Psychiatry: Alert and oriented x 3, Mood & affect appropriate  Gastrointestinal:  Soft, Non-tender  Skin/Integument: No rashes, No ecchymosis, No cyanosis	  Neurologic: Non-focal  Musculoskeletal/ extremities: Normal range of motion, No clubbing, cyanosis or edema  Vascular: Peripheral pulses palpable 2+ bilaterally    LABS:	 	                          10.2   21.60 )-----------( 333      ( 23 Apr 2020 04:20 )             32.0     04-23    146  |  97<L>  |  120<HH>  ----------------------------<  78  4.6   |  29  |  1.1    Ca    10.4<H>      23 Apr 2020 04:20    TPro  6.8  /  Alb  3.7  /  TBili  0.5  /  DBili  x   /  AST  32  /  ALT  15  /  AlkPhos  60  04-23        PTT - ( 23 Apr 2020 04:20 )  PTT:58.9 sec    TELEMETRY EVENTS: 	        ECG:  	      RADIOLOGY:  CXR:     PREVIOUS DIAGNOSTIC TESTING:    [ ] Echocardiogram:      [ ]  Catheterization:      [ ] Stress Test:  	  	    Home Medications:  aspirin 81 mg oral tablet: 1 tab(s) orally once a day (13 Apr 2020 14:46)  ATORVASTATIN TAB 40MG:  (13 Apr 2020 14:46)  BUSPIRONE    TAB 5MG:  (13 Apr 2020 14:46)  CITALOPRAM   TAB 10MG:  (13 Apr 2020 14:46)  CLOPIDOGREL  TAB 75MG:  (13 Apr 2020 14:46)  FENOFIBRATE  CAP 134MG:  (13 Apr 2020 14:46)  FOLIC ACID   TAB 1MG:  (13 Apr 2020 14:46)  METOPROL TAR TAB 50MG:  (13 Apr 2020 14:46)    MEDICATIONS  (STANDING):  artificial tears (preservative free) Ophthalmic Solution 1 Drop(s) Both EYES two times a day  aspirin  chewable 81 milliGRAM(s) Oral daily  chlorhexidine 0.12% Liquid 15 milliLiter(s) Oral Mucosa two times a day  chlorhexidine 4% Liquid 1 Application(s) Topical <User Schedule>  dextrose 5%. 1000 milliLiter(s) (50 mL/Hr) IV Continuous <Continuous>  dextrose 50% Injectable 12.5 Gram(s) IV Push once  dextrose 50% Injectable 25 Gram(s) IV Push once  dextrose 50% Injectable 25 Gram(s) IV Push once  fentaNYL   Infusion. 0.5 MICROgram(s)/kG/Hr (5.9 mL/Hr) IV Continuous <Continuous>  folic acid 1 milliGRAM(s) Oral daily  furosemide   Injectable 40 milliGRAM(s) IV Push daily  heparin  Infusion 1900 Unit(s)/Hr (11 mL/Hr) IV Continuous <Continuous>  insulin glargine Injectable (LANTUS) 18 Unit(s) SubCutaneous at bedtime  insulin lispro (HumaLOG) corrective regimen sliding scale   SubCutaneous three times a day before meals  insulin lispro Injectable (HumaLOG) 6 Unit(s) SubCutaneous every 6 hours  norepinephrine Infusion 0.05 MICROgram(s)/kG/Min (5.53 mL/Hr) IV Continuous <Continuous>  pantoprazole   Suspension 40 milliGRAM(s) Oral daily  polyethylene glycol 3350 17 Gram(s) Oral two times a day  propofol Infusion 20.056 MICROgram(s)/kG/Min (14.2 mL/Hr) IV Continuous <Continuous>  senna 2 Tablet(s) Oral at bedtime  vasopressin Infusion 0.02 Unit(s)/Min (1.2 mL/Hr) IV Continuous <Continuous>    MEDICATIONS  (PRN):  dextrose 40% Gel 15 Gram(s) Oral once PRN Blood Glucose LESS THAN 70 milliGRAM(s)/deciliter  glucagon  Injectable 1 milliGRAM(s) IntraMuscular once PRN Glucose LESS THAN 70 milligrams/deciliter Date of Admission: 4/13    CHIEF COMPLAINT: sob    HISTORY OF PRESENT ILLNESS:   71 YO F with PMH of asthma (never hospitalized, never intubated), CAD s/p MI s/p PCI of prox LAD (06/2017), HTN, HLD, and DM (diet-controlled) presents from [facility] w/ the chief complaint of shortness of breath, fevers, chills for 1 week and has been progressively worsening. Pt. admitted for septic shock and acute hypoxic respiratory failure secondary to COVID-19 PNA, suspected GNR PNA s/p intubation and mechanical ventilation complicated by ARDS / Cytokine Release storm, BRIDGETTE, possible PE, and and episode of Paroxysmal A.Fib.    Pt. being seen for an episode of Paroxysmal A.Fib from which pt. spontaneously converted to NSR. On pressors and mechanical ventilatory support.    PAST MEDICAL & SURGICAL HISTORY:  Depression  Anxiety  Dyslipidemia  Diabetes mellitus  Hypertension      FAMILY HISTORY:  [x] no pertinent family history of premature cardiovascular disease in first degree relatives.  Mother:   Father:   Siblings:     SOCIAL HISTORY:    [ ] Non-smoker  [ ] Smoker  [ ] Alcohol    Allergies    No Known Allergies    Intolerances    	    REVIEW OF SYSTEMS:  pt. intubated, sedated unable to obtain ROS    PHYSICAL EXAM:  T(C): 36.7 (04-23-20 @ 21:00), Max: 36.9 (04-23-20 @ 12:00)  HR: 74 (04-23-20 @ 21:00) (67 - 123)  BP: --  RR: 20 (04-23-20 @ 21:00) (20 - 20)  SpO2: 95% (04-23-20 @ 21:00) (92% - 99%)  Wt(kg): --  I&O's Summary    22 Apr 2020 07:01  -  23 Apr 2020 07:00  --------------------------------------------------------  IN: 390 mL / OUT: 2775 mL / NET: -2385 mL    23 Apr 2020 07:01  -  23 Apr 2020 22:29  --------------------------------------------------------  IN: 1315.1 mL / OUT: 1875 mL / NET: -559.9 mL        To limit the exposure of COVID-19 to HCW's and to limit use of PPE, PE deferred    LABS:	 	                          10.2   21.60 )-----------( 333      ( 23 Apr 2020 04:20 )             32.0     04-23    146  |  97<L>  |  120<HH>  ----------------------------<  78  4.6   |  29  |  1.1    Ca    10.4<H>      23 Apr 2020 04:20    TPro  6.8  /  Alb  3.7  /  TBili  0.5  /  DBili  x   /  AST  32  /  ALT  15  /  AlkPhos  60  04-23        PTT - ( 23 Apr 2020 04:20 )  PTT:58.9 sec    TELEMETRY EVENTS: 	        ECG:  	  A.Fib with RVR (currently in NSR)    RADIOLOGY:  CXR:   Xray Chest 1 View- PORTABLE-Routine (04.23.20 @ 06:19)  Bilateral airspace opacities, right greater than left, unchanged.        PREVIOUS DIAGNOSTIC TESTING:    [x] Echocardiogram:  < from: Transthoracic Echocardiogram (04.16.20 @ 15:22) >   1. LV Ejection Fraction by Cabrera's Method with a biplane EF of 66 %.   2. Moderately increased LV wall thickness.   3. Spectral Doppler shows impaired relaxation pattern of left ventricular myocardial filling (Grade I diastolic dysfunction).   4. Moderately enlarged right ventricle.   5. Moderately reduced RV systolic function.   6. Mild thickening and calcification of the anterior and posterior mitral valve leaflets.   7. Mitral annular calcification.   8. Sclerotic aortic valve with decreased opening.   9. There is aortic stenosis on the basis of 2D imaging. Severity of the AS cannot be evaluated on this limited study. If clinically indicated, consider additional imaging to assess aortic valve gradients.  10. Estimated pulmonary artery systolic pressure is 58.1 mmHg assuming a right atrial pressure of 10 mmHg, which is consistent with moderate pulmonary hypertension.      [x]  Catheterization: 6/2017  1 vessel disease, prox % stenosis s/p DESx1    [ ] Stress Test:  	  	    Home Medications:  aspirin 81 mg oral tablet: 1 tab(s) orally once a day (13 Apr 2020 14:46)  ATORVASTATIN TAB 40MG:  (13 Apr 2020 14:46)  BUSPIRONE    TAB 5MG:  (13 Apr 2020 14:46)  CITALOPRAM   TAB 10MG:  (13 Apr 2020 14:46)  CLOPIDOGREL  TAB 75MG:  (13 Apr 2020 14:46)  FENOFIBRATE  CAP 134MG:  (13 Apr 2020 14:46)  FOLIC ACID   TAB 1MG:  (13 Apr 2020 14:46)  METOPROL TAR TAB 50MG:  (13 Apr 2020 14:46)    MEDICATIONS  (STANDING):  artificial tears (preservative free) Ophthalmic Solution 1 Drop(s) Both EYES two times a day  aspirin  chewable 81 milliGRAM(s) Oral daily  chlorhexidine 0.12% Liquid 15 milliLiter(s) Oral Mucosa two times a day  chlorhexidine 4% Liquid 1 Application(s) Topical <User Schedule>  dextrose 5%. 1000 milliLiter(s) (50 mL/Hr) IV Continuous <Continuous>  dextrose 50% Injectable 12.5 Gram(s) IV Push once  dextrose 50% Injectable 25 Gram(s) IV Push once  dextrose 50% Injectable 25 Gram(s) IV Push once  fentaNYL   Infusion. 0.5 MICROgram(s)/kG/Hr (5.9 mL/Hr) IV Continuous <Continuous>  folic acid 1 milliGRAM(s) Oral daily  furosemide   Injectable 40 milliGRAM(s) IV Push daily  heparin  Infusion 1900 Unit(s)/Hr (11 mL/Hr) IV Continuous <Continuous>  insulin glargine Injectable (LANTUS) 18 Unit(s) SubCutaneous at bedtime  insulin lispro (HumaLOG) corrective regimen sliding scale   SubCutaneous three times a day before meals  insulin lispro Injectable (HumaLOG) 6 Unit(s) SubCutaneous every 6 hours  norepinephrine Infusion 0.05 MICROgram(s)/kG/Min (5.53 mL/Hr) IV Continuous <Continuous>  pantoprazole   Suspension 40 milliGRAM(s) Oral daily  polyethylene glycol 3350 17 Gram(s) Oral two times a day  propofol Infusion 20.056 MICROgram(s)/kG/Min (14.2 mL/Hr) IV Continuous <Continuous>  senna 2 Tablet(s) Oral at bedtime  vasopressin Infusion 0.02 Unit(s)/Min (1.2 mL/Hr) IV Continuous <Continuous>    MEDICATIONS  (PRN):  dextrose 40% Gel 15 Gram(s) Oral once PRN Blood Glucose LESS THAN 70 milliGRAM(s)/deciliter  glucagon  Injectable 1 milliGRAM(s) IntraMuscular once PRN Glucose LESS THAN 70 milligrams/deciliter

## 2020-04-24 NOTE — PROGRESS NOTE ADULT - ASSESSMENT
70y F w/ PMH of asthma (never hospitalized, never intubated), CAD s/p stent (07/2018), HTN, HLD, and DM (diet-controlled) presents from [facility] w/ the chief complaint of shortness of breath, fevers, chills for 1 week and has been progressively worsening.    Impression   ARF hypoxic   -r/o COVID  -Suspected GNR PNA  viral PNA secondary   -Asthma Exacerbation  ARDS  BRIDGETTE    Plan     1.CNS  sedation vacation to asses mental status    add fentanyl     2. CVS     echo 66% moderate LVH , puln htn ?? rv dilatation        3. PULMONARY   lower fio 2 to 55  pressure 30/12       4. INFECTIOUS DISEASE     on HC  on angeli   follow ID    ANAKirna   follow ferritin ,crp d-dimer repeat   follow up Id recommendation     5. GI  -GI ppx, protonix  -once intubated,  OG feeds  check LFT     6. RENAL  -replete electrolytes PRN  follow renal   on lasix Q 24 hrs    monitor Is and OS     7. Endocrine   -f/u FS, target FS <180    8. Hematology   start heparin IV for now keep ptt 60 elevated d-dimer Rv dilatation   PA 58  9. DVT PROPHYLAXIS  heparin drip follow ptt kepp 60 elevated d-dimner     11. Code Status   -full code

## 2020-04-24 NOTE — PROGRESS NOTE ADULT - ASSESSMENT
70y F w/ PMH of asthma (never hospitalized, never intubated), CAD s/p stent (07/2018), HTN, HLD, and DM (diet-controlled) presents from [facility] w/ the chief complaint of shortness of breath, fevers, chills for 1 week and has been progressively worsening.      # Hypoxic respiratory failure  - Intubated  - Chest X-ray not ameliorating, very high suspicion of Left lower lobe complete atelectasis/collapse? and white right lung.  - Etiology may include:     > Given pandemic > COVID - 19       + COVID-19 PCR +ve      + Completed HCQ      + Kineret on 4/16      + Solumedrol 60mg IV q12h > dropped to 40qd on 4/16 and DCed on 4/24      + Started on Remdecivir on 4/23 for 10 days  - Inflammatory biomarkers > CRP 24 (was 20),  (was 432), Ferritin 1599 (was 1111) >> after kineret >> CRP downtrending now 4.5, ferritin 1100    > Bacterial LRTI superinfection      + On meropenem and vancomycin (dose adjusted according to clerance) ( Vancomycin level 20, on hold due to BRIDGETTE since 4/13)      + Pending DTA cultures  - RV dilated, PA pressure elevated, d-dimer elevated, possibility of PE    > Started on heparin therapeutic on 4/17    > DC plavix to decrease incidence of bleed, last stent placed >1 year ago    # Afib  - transient episode of atrial fibrillation on morning of 4/23 > self resolved.    # BRIDGETTE   - Not known CKD, creatinine normal on admission   - Non-oliguric for now, receiving Lasix 40mg IV qd  - ATN? Drug induced? Ameliorating  - DC vancomycin on 4/13  - Will follow with nephrology    # Ischemic heart disease  - On DAPT on admission - Held aspirin after starting therapeutic anticoagulation on 4/17  - Metoprolol 50mg bid discontinued on 4/17 for hemodynamic stability  - On lasix IV bid  - Echo done showed LVEF 66%, mod RV dysfunction, PASP 58mmHg.     # DVT prophylaxis > On heparin therapeutic

## 2020-04-24 NOTE — PROGRESS NOTE ADULT - SUBJECTIVE AND OBJECTIVE BOX
Subjective:  The patient is sedated, intubated, ventilated.     Objective:      artificial tears (preservative free) Ophthalmic Solution 1 Drop(s) Both EYES two times a day  aspirin  chewable 81 milliGRAM(s) Oral daily  chlorhexidine 0.12% Liquid 15 milliLiter(s) Oral Mucosa two times a day  chlorhexidine 4% Liquid 1 Application(s) Topical <User Schedule>  dextrose 40% Gel 15 Gram(s) Oral once PRN  dextrose 5%. 1000 milliLiter(s) IV Continuous <Continuous>  dextrose 50% Injectable 12.5 Gram(s) IV Push once  dextrose 50% Injectable 25 Gram(s) IV Push once  dextrose 50% Injectable 25 Gram(s) IV Push once  fentaNYL   Infusion. 0.5 MICROgram(s)/kG/Hr IV Continuous <Continuous>  folic acid 1 milliGRAM(s) Oral daily  furosemide   Injectable 40 milliGRAM(s) IV Push daily  glucagon  Injectable 1 milliGRAM(s) IntraMuscular once PRN  heparin  Infusion 1900 Unit(s)/Hr IV Continuous <Continuous>  insulin glargine Injectable (LANTUS) 18 Unit(s) SubCutaneous at bedtime  insulin lispro (HumaLOG) corrective regimen sliding scale   SubCutaneous three times a day before meals  insulin lispro Injectable (HumaLOG) 6 Unit(s) SubCutaneous every 6 hours  norepinephrine Infusion 0.05 MICROgram(s)/kG/Min IV Continuous <Continuous>  pantoprazole   Suspension 40 milliGRAM(s) Oral daily  polyethylene glycol 3350 17 Gram(s) Oral two times a day  propofol Infusion 20.056 MICROgram(s)/kG/Min IV Continuous <Continuous>  senna 2 Tablet(s) Oral at bedtime  vasopressin Infusion 0.02 Unit(s)/Min IV Continuous <Continuous>      PHYSICAL EXAM:  General: Sedated, intubated, ventilated  Cardiac: S1 S2 heard regular  Pulmonary: Audible bilateral crackles  Abdomen: Soft, +BS     T(C): 36.8 (04-24-20 @ 08:00), Max: 37.1 (04-24-20 @ 07:30)  HR: 92 (04-24-20 @ 11:00) (67 - 98)  BP: --  RR: 20 (04-24-20 @ 11:00) (18 - 20)  SpO2: 94% (04-24-20 @ 11:00) (92% - 96%)    LABS:                        9.3    15.17 )-----------( 260      ( 24 Apr 2020 04:30 )             29.2     04-24    142  |  96<L>  |  120<HH>  ----------------------------<  135<H>  3.9   |  25  |  1.2    Ca    9.9      24 Apr 2020 04:30  Mg     2.6     04-24    TPro  6.2  /  Alb  3.4<L>  /  TBili  0.6  /  DBili  x   /  AST  25  /  ALT  13  /  AlkPhos  53  04-24    PT/INR - ( 24 Apr 2020 04:30 )   PT: 11.60 sec;   INR: 1.01 ratio         PTT - ( 24 Apr 2020 04:30 )  PTT:44.9 sec  Aspartate Aminotransferase (AST/SGOT): 25 U/L (04-24-20 @ 04:30)  Alanine Aminotransferase (ALT/SGPT): 13 U/L (04-24-20 @ 04:30)

## 2020-04-24 NOTE — CHART NOTE - NSCHARTNOTEFT_GEN_A_CORE
Spoke with Adolfo Herrera at [phone] updated him on patient's  status.   Answered questions, addressed concerns.

## 2020-04-24 NOTE — CHART NOTE - NSCHARTNOTEFT_GEN_A_CORE
Registered Dietitian Follow-Up     Patient Profile Reviewed                           Yes [x]   No []     Nutrition History Previously Obtained        Yes []  No [x]  remains intubated/sedated on  limited contact precautions d/t COVID19     Pertinent Medical Interventions: Pt continues intubated, sedated, on 1 active pressor. Continues propofol @21ml/h, provides additional 554kcal from fat. Plan for sedation vacation to assess mental status. On lasix q24h. MAP 67, has been trending in 60s range.     Diet order: Vital HP 30ml/h (substituted with Peptamen Intense VHP) with prosource TF packets x3/day. Provides: 840kcal, 100g protein, 605ml free water.  WITH ADDITIONAL PROPOFOL KCAL: 1394kcal/day total     Anthropometrics:  - Ht. 165.1cm  - Wt. dosing 118 kg/260 lbs  - %wt change no new wt since previous assessment  - BMI 43.9  - IBW 56.8 kg/125 lbs +/-10%     Pertinent Lab Data: (4/24) H/H 9.3/29.2, Cl 96, , s gluc 135, eGFR 46, Mg 2.6   recent  125 111 146 179     Pertinent Meds: heparin, lantus, humalog, fentanyl, vasopressin, miralax, senna, lasix, levophed, folic acid, protonix, propofol (21ml/h)     Physical Findings:  - Appearance: obese; 1+ generalized edema  - GI function: last BM 4/16 documented. Started on bowel regimen. abdomen nontender, nondistended  - Tubes: OGT  - Oral/Mouth cavity: NPO  - Skin: WDL except skin tear     Nutrition Requirements  Weight Used: dosing 118 kg/260 lbs, IBW 56.8 kg/125 lbs (continued from previous assessment on 4/17 as needs remain adequate +/- 100 kcal using new Ve: 11.3, Tmax: 37.1--- [id number] = 2003 kcal)     Estimated Energy Needs    Continue [x] 1250 - 1500 kcals/day   1120 - 1306 kcals (60-70% [id number]) vs. 1250 - 1420 kcals (22-25 kcal/kg IBW) vs. 1298 - 1652 kcals (11-14 kcal/kg ABW)       Estimated Protein Needs    Continue [x] 114 - 125 gms/day (2.0 - 2.2 gm/kg IBW)     Estimated Fluid Needs        Continue [x] per LIP       Nutrient Intake: 107% est energy needs (d/t propofol additional kcal) and 88% est protein needs        [x] Previous Nutrition Diagnosis: Inadequate protein/energy intake             [x] Resolved -- however, to continue to follow as at risk as pt is intubated/critically ill          Nutrition Intervention EN, medical food supplements, nutrition related medication management,      Goal/Expected Outcome: pt to meet % estimated protein/energy requirements within the next 4 days     Indicator/Monitoring: RD to monitor diet order, energy intake, body composition, NFPF, glucose/renal profiles. Pt at risk f/u 4 days. Recs discussed with covering resident [phone].    Recommendation: Pt MAP has been varying in the 60s range, would decrease TF regimen for now and increase to goal once pt is consistently MAP >65.  --Decrease TF regimen to 10ml/h with Prosource TF x3/day.  --Goal: Continuous rate of 30 mL/h with Prosource TF TID (additional 120 kcal, 33 g protein). At goal this will provide: 840kcal (or 1394 including kcal from propofol), 100 g protein and 605 mL free H2O. Additional fluids per LIP. Maintain aspiration precautions, hold EN if MAP <65, monitor/replete electrolytes PRN, initiate bowel regimen.

## 2020-04-24 NOTE — PROGRESS NOTE ADULT - ASSESSMENT
· Assessment		  70y F w/ PMH of asthma (never hospitalized, never intubated), CAD s/p stent (07/2018), HTN, HLD, and DM (diet-controlled) presents from [facility] w/ the chief complaint of shortness of breath, fevers, chills for 1 week and has been progressively worsening.    IMPRESSION;   COVID19 with septic shock requiring pressors, resp failure, mechanical ventilation with ARDS with FiO2 80 %, secondary to Cytokine Release Syndrome  -inflammatory markers elevated  -elevated Ddimer is a poor prognostic indicator and differentiate survivors from non survivors  -procalcitonin of 0.65 > 0.47 suggestive of a bacterial PNA/infection   -was on azithro/ merem in [place]   -BCx NG  s/p Anakinra    RECOMMENDATIONS;  fidel PA  Remdesivir since 4/23. 10 days in all  Daily CBC BMP PT PTT UA  S/p PLAQUENIL   s/p Anakinra  4/15-18  d/c Solumedrol   off Meropenem   poor prognosis  anticoagulation  NO NEED TO REPEAT INFLAMMATORY MARKERS

## 2020-04-24 NOTE — PROGRESS NOTE ADULT - SUBJECTIVE AND OBJECTIVE BOX
Patient is a 70y old  Female who presents with a chief complaint of shortness of breath (23 Apr 2020 22:29)      Over Night Events:  Patient seen and examined.   still vented on sedation prop and fentanyl   0.22 levo  ROS:  See HPI    PHYSICAL EXAM    ICU Vital Signs Last 24 Hrs  T(C): 36.8 (24 Apr 2020 08:00), Max: 37.1 (24 Apr 2020 07:30)  T(F): 98.2 (24 Apr 2020 08:00), Max: 98.7 (24 Apr 2020 07:30)  HR: 85 (24 Apr 2020 08:00) (67 - 85)  BP: --  BP(mean): --  ABP: 104/48 (24 Apr 2020 08:00) (100/45 - 124/60)  ABP(mean): 69 (24 Apr 2020 07:30) (60 - 81)  RR: 20 (24 Apr 2020 08:00) (18 - 20)  SpO2: 96% (24 Apr 2020 08:00) (94% - 96%)      General: on sedation   HEENT:          et tube       Lymph Nodes: NO cervical LN   Lungs: Bilateral BS  Cardiovascular: Regular   Abdomen: Soft, Positive BS  Extremities: No clubbing   Skin: warm   Neurological:  no focla deficit   Musculoskeletal: move all ext     I&O's Detail    23 Apr 2020 07:01  -  24 Apr 2020 07:00  --------------------------------------------------------  IN:    Enteral Tube Flush: 60 mL    fentaNYL Infusion.: 172.7 mL    heparin Infusion: 220 mL    morphine  Infusion.: 20 mL    norepinephrine Infusion: 390 mL    propofol Infusion: 674.8 mL    vasopressin Infusion: 9.6 mL    Vital High Protein: 600 mL  Total IN: 2147.1 mL    OUT:    Indwelling Catheter - Urethral: 2375 mL  Total OUT: 2375 mL    Total NET: -227.9 mL          LABS:                          9.3    15.17 )-----------( 260      ( 24 Apr 2020 04:30 )             29.2         24 Apr 2020 04:30    142    |  96     |  120    ----------------------------<  135    3.9     |  25     |  1.2      Ca    9.9        24 Apr 2020 04:30  Mg     2.6       24 Apr 2020 04:30    TPro  6.2    /  Alb  3.4    /  TBili  0.6    /  DBili  x      /  AST  25     /  ALT  13     /  AlkPhos  53     24 Apr 2020 04:30  Amylase x     lipase x                                                 PT/INR - ( 24 Apr 2020 04:30 )   PT: 11.60 sec;   INR: 1.01 ratio         PTT - ( 24 Apr 2020 04:30 )  PTT:44.9 sec                                                                                                   Culture - Blood (collected 22 Apr 2020 11:00)  Source: .Blood Blood  Preliminary Report (23 Apr 2020 23:01):    No growth to date.                                                   Mode: AC/ CMV (Assist Control/ Continuous Mandatory Ventilation)  RR (machine): 20  FiO2: 65  PEEP: 12  PS: 18  ITime: 1  PC: 30  PIP: 30                                      ABG - ( 24 Apr 2020 01:34 )  pH, Arterial: 7.49  pH, Blood: x     /  pCO2: 37    /  pO2: 67    / HCO3: 28    / Base Excess: 4.3   /  SaO2: 93                  MEDICATIONS  (STANDING):  artificial tears (preservative free) Ophthalmic Solution 1 Drop(s) Both EYES two times a day  aspirin  chewable 81 milliGRAM(s) Oral daily  chlorhexidine 0.12% Liquid 15 milliLiter(s) Oral Mucosa two times a day  chlorhexidine 4% Liquid 1 Application(s) Topical <User Schedule>  dextrose 5%. 1000 milliLiter(s) (50 mL/Hr) IV Continuous <Continuous>  dextrose 50% Injectable 12.5 Gram(s) IV Push once  dextrose 50% Injectable 25 Gram(s) IV Push once  dextrose 50% Injectable 25 Gram(s) IV Push once  fentaNYL   Infusion. 0.5 MICROgram(s)/kG/Hr (5.9 mL/Hr) IV Continuous <Continuous>  folic acid 1 milliGRAM(s) Oral daily  furosemide   Injectable 40 milliGRAM(s) IV Push daily  heparin  Infusion 1900 Unit(s)/Hr (11 mL/Hr) IV Continuous <Continuous>  insulin glargine Injectable (LANTUS) 18 Unit(s) SubCutaneous at bedtime  insulin lispro (HumaLOG) corrective regimen sliding scale   SubCutaneous three times a day before meals  insulin lispro Injectable (HumaLOG) 6 Unit(s) SubCutaneous every 6 hours  norepinephrine Infusion 0.05 MICROgram(s)/kG/Min (5.53 mL/Hr) IV Continuous <Continuous>  pantoprazole   Suspension 40 milliGRAM(s) Oral daily  polyethylene glycol 3350 17 Gram(s) Oral two times a day  propofol Infusion 20.056 MICROgram(s)/kG/Min (14.2 mL/Hr) IV Continuous <Continuous>  senna 2 Tablet(s) Oral at bedtime  vasopressin Infusion 0.02 Unit(s)/Min (1.2 mL/Hr) IV Continuous <Continuous>    MEDICATIONS  (PRN):  dextrose 40% Gel 15 Gram(s) Oral once PRN Blood Glucose LESS THAN 70 milliGRAM(s)/deciliter  glucagon  Injectable 1 milliGRAM(s) IntraMuscular once PRN Glucose LESS THAN 70 milligrams/deciliter          Xrays:  TLC:  OG:  ET tube:                                                                                    b/l opacity    ECHO:  [facility] ICU:

## 2020-04-24 NOTE — PROGRESS NOTE ADULT - SUBJECTIVE AND OBJECTIVE BOX
NIRMAL DANIELS  70y, Female    All available historical data reviewed    OVERNIGHT EVENTS:  no fever  fio2 80%    ROS:  unable to obtain history secondary to patient's mental status and/or sedation     VITALS:  T(F): 98.2, Max: 98.7 (04-24-20 @ 07:30)  HR: 92  BP: --  RR: 20Vital Signs Last 24 Hrs  T(C): 36.8 (24 Apr 2020 08:00), Max: 37.1 (24 Apr 2020 07:30)  T(F): 98.2 (24 Apr 2020 08:00), Max: 98.7 (24 Apr 2020 07:30)  HR: 92 (24 Apr 2020 11:00) (67 - 98)  BP: --  BP(mean): --  RR: 20 (24 Apr 2020 11:00) (18 - 20)  SpO2: 94% (24 Apr 2020 11:00) (92% - 96%)    TESTS & MEASUREMENTS:                        9.3    15.17 )-----------( 260      ( 24 Apr 2020 04:30 )             29.2     04-24    142  |  96<L>  |  120<HH>  ----------------------------<  135<H>  3.9   |  25  |  1.2    Ca    9.9      24 Apr 2020 04:30  Mg     2.6     04-24    TPro  6.2  /  Alb  3.4<L>  /  TBili  0.6  /  DBili  x   /  AST  25  /  ALT  13  /  AlkPhos  53  04-24    LIVER FUNCTIONS - ( 24 Apr 2020 04:30 )  Alb: 3.4 g/dL / Pro: 6.2 g/dL / ALK PHOS: 53 U/L / ALT: 13 U/L / AST: 25 U/L / GGT: x             Culture - Blood (collected 04-22-20 @ 11:00)  Source: .Blood Blood  Preliminary Report (04-23-20 @ 23:01):    No growth to date.            RADIOLOGY & ADDITIONAL TESTS:  Personal review of radiological diagnostics performed  Echo and EKG results noted when applicable.     MEDICATIONS:  artificial tears (preservative free) Ophthalmic Solution 1 Drop(s) Both EYES two times a day  aspirin  chewable 81 milliGRAM(s) Oral daily  chlorhexidine 0.12% Liquid 15 milliLiter(s) Oral Mucosa two times a day  chlorhexidine 4% Liquid 1 Application(s) Topical <User Schedule>  dextrose 40% Gel 15 Gram(s) Oral once PRN  dextrose 5%. 1000 milliLiter(s) IV Continuous <Continuous>  dextrose 50% Injectable 12.5 Gram(s) IV Push once  dextrose 50% Injectable 25 Gram(s) IV Push once  dextrose 50% Injectable 25 Gram(s) IV Push once  fentaNYL   Infusion. 0.5 MICROgram(s)/kG/Hr IV Continuous <Continuous>  folic acid 1 milliGRAM(s) Oral daily  furosemide   Injectable 40 milliGRAM(s) IV Push daily  glucagon  Injectable 1 milliGRAM(s) IntraMuscular once PRN  heparin  Infusion 1900 Unit(s)/Hr IV Continuous <Continuous>  insulin glargine Injectable (LANTUS) 18 Unit(s) SubCutaneous at bedtime  insulin lispro (HumaLOG) corrective regimen sliding scale   SubCutaneous three times a day before meals  insulin lispro Injectable (HumaLOG) 6 Unit(s) SubCutaneous every 6 hours  norepinephrine Infusion 0.05 MICROgram(s)/kG/Min IV Continuous <Continuous>  pantoprazole   Suspension 40 milliGRAM(s) Oral daily  polyethylene glycol 3350 17 Gram(s) Oral two times a day  propofol Infusion 20.056 MICROgram(s)/kG/Min IV Continuous <Continuous>  senna 2 Tablet(s) Oral at bedtime  vasopressin Infusion 0.02 Unit(s)/Min IV Continuous <Continuous>      ANTIBIOTICS:

## 2020-04-24 NOTE — CHART NOTE - NSCHARTNOTEFT_GEN_A_CORE
20:00 PTT was subtherapeutic at 43.1.    Plan:  - Increase drip rate from 11 to 14, per AC protocol  - F/u AM PTT

## 2020-04-25 NOTE — PROGRESS NOTE ADULT - SUBJECTIVE AND OBJECTIVE BOX
Subjective:  The patient is sedated, intubated, ventilated.     Objective:      artificial tears (preservative free) Ophthalmic Solution 1 Drop(s) Both EYES two times a day  aspirin  chewable 81 milliGRAM(s) Oral daily  chlorhexidine 0.12% Liquid 15 milliLiter(s) Oral Mucosa two times a day  chlorhexidine 4% Liquid 1 Application(s) Topical <User Schedule>  dexMEDEtomidine Infusion 0.2 MICROgram(s)/kG/Hr IV Continuous <Continuous>  dextrose 40% Gel 15 Gram(s) Oral once PRN  dextrose 5%. 1000 milliLiter(s) IV Continuous <Continuous>  dextrose 50% Injectable 12.5 Gram(s) IV Push once  dextrose 50% Injectable 25 Gram(s) IV Push once  dextrose 50% Injectable 25 Gram(s) IV Push once  fentaNYL   Infusion. 0.5 MICROgram(s)/kG/Hr IV Continuous <Continuous>  folic acid 1 milliGRAM(s) Oral daily  furosemide   Injectable 40 milliGRAM(s) IV Push daily  glucagon  Injectable 1 milliGRAM(s) IntraMuscular once PRN  heparin  Infusion 1900 Unit(s)/Hr IV Continuous <Continuous>  insulin glargine Injectable (LANTUS) 18 Unit(s) SubCutaneous at bedtime  insulin lispro (HumaLOG) corrective regimen sliding scale   SubCutaneous three times a day before meals  insulin lispro Injectable (HumaLOG) 6 Unit(s) SubCutaneous every 6 hours  investigational medication - IVPB 100 milliGRAM(s) IV Intermittent <User Schedule>  norepinephrine Infusion 0.05 MICROgram(s)/kG/Min IV Continuous <Continuous>  pantoprazole   Suspension 40 milliGRAM(s) Oral daily  polyethylene glycol 3350 17 Gram(s) Oral two times a day  propofol Infusion 20.056 MICROgram(s)/kG/Min IV Continuous <Continuous>  senna 2 Tablet(s) Oral at bedtime  vasopressin Infusion 0.02 Unit(s)/Min IV Continuous <Continuous>      PHYSICAL EXAM:  General: Sedated, intubated, ventilated  Cardiac: S1 S2 heard regular  Pulmonary: Audible bilateral crackles  Abdomen: Soft, +BS     T(C): 36.9 (04-24-20 @ 19:00), Max: 36.9 (04-24-20 @ 16:00)  HR: 81 (04-25-20 @ 11:30) (81 - 99)  BP: --  RR: 20 (04-25-20 @ 11:30) (20 - 20)  SpO2: 97% (04-25-20 @ 11:30) (93% - 97%)    LABS:                        9.2    12.61 )-----------( 240      ( 25 Apr 2020 04:30 )             30.5     04-25    144  |  100  |  104<HH>  ----------------------------<  140<H>  3.7   |  23  |  1.2    Ca    9.6      25 Apr 2020 04:30  Mg     2.6     04-24    TPro  6.1  /  Alb  3.3<L>  /  TBili  0.6  /  DBili  x   /  AST  31  /  ALT  14  /  AlkPhos  57  04-25    PT/INR - ( 25 Apr 2020 04:30 )   PT: 12.40 sec;   INR: 1.08 ratio         PTT - ( 25 Apr 2020 04:30 )  PTT:66.4 sec  Aspartate Aminotransferase (AST/SGOT): 31 U/L (04-25-20 @ 04:30)  Alanine Aminotransferase (ALT/SGPT): 14 U/L (04-25-20 @ 04:30)  Bilirubin: Negative (04-25-20 @ 04:30)

## 2020-04-25 NOTE — PROGRESS NOTE ADULT - ASSESSMENT
70y F w/ PMH of asthma (never hospitalized, never intubated), CAD s/p stent (07/2018), HTN, HLD, and DM (diet-controlled) presents from [facility] w/ the chief complaint of shortness of breath, fevers, chills for 1 week and has been progressively worsening.      # Hypoxic respiratory failure  - Intubated  - Chest X-ray not ameliorating, very high suspicion of Left lower lobe complete atelectasis/collapse? and white right lung.  - Etiology may include:     > Given pandemic > COVID - 19       + COVID-19 PCR +ve      + Completed HCQ      + Kineret on 4/16      + Solumedrol 60mg IV q12h > dropped to 40qd on 4/16 and DCed on 4/24      + Started on Remdecivir on 4/23 for 10 days  - Inflammatory biomarkers > CRP 24 (was 20),  (was 432), Ferritin 1599 (was 1111) >> after kineret >> CRP downtrending now 4.5, ferritin 1100    > Bacterial LRTI superinfection      + On meropenem and vancomycin (dose adjusted according to clerance) ( Vancomycin level 20, on hold due to BRIDGETTE since 4/13)      + Pending DTA cultures  - RV dilated, PA pressure elevated, d-dimer elevated, possibility of PE    > Started on heparin therapeutic on 4/17    > DC plavix to decrease incidence of bleed, last stent placed >1 year ago    # Afib  - transient episode of atrial fibrillation on morning of 4/23 > self resolved.    # BRIDGETTE   - Not known CKD, creatinine normal on admission   - Non-oliguric for now, receiving Lasix 40mg IV qd  - ATN? Drug induced? Ameliorating  - DC vancomycin on 4/13  - Will follow with nephrology    # Ischemic heart disease  - On DAPT on admission - Held aspirin after starting therapeutic anticoagulation on 4/17  - Metoprolol 50mg bid discontinued on 4/17 for hemodynamic stability  - On lasix IV bid  - Echo done showed LVEF 66%, mod RV dysfunction, PASP 58mmHg.     # DVT prophylaxis > On heparin therapeutic

## 2020-04-25 NOTE — CHART NOTE - NSCHARTNOTEFT_GEN_A_CORE
Spoke with Adolfo Herrera at [phone] updated him on patient's  status.   Answered questions, addressed concerns.

## 2020-04-25 NOTE — PROGRESS NOTE ADULT - SUBJECTIVE AND OBJECTIVE BOX
Patient is a 70y old  Female who presents with a chief complaint of shortness of breath (2020 11:43)      Over Night Events:  Patient seen and examined.   still vented on sedation on pressors   s/p remdesvir       ROS:  See HPI    PHYSICAL EXAM    ICU Vital Signs Last 24 Hrs  T(C): 36.9 (2020 19:00), Max: 36.9 (2020 16:00)  T(F): 98.5 (2020 19:00), Max: 98.5 (2020 19:00)  HR: 88 (2020 01:28) (80 - 98)  BP: --  BP(mean): --  ABP: 116/50 (:) (104/48 - 127/57)  ABP(mean): 72 (:) (66 - 76)  RR: 20 (:) (20 - 20)  SpO2: 96% (:) (92% - 96%)      General: on sedation   HEENT:        etu be          Lymph Nodes: NO cervical LN   Lungs: Bilateral BS  Cardiovascular: Regular   Abdomen: Soft, Positive BS  Extremities: No clubbing   Skin: warm   Neurological:  no focal   Musculoskeletal: move all ext     I&O's Detail    2020 07:01  -  2020 07:00  --------------------------------------------------------  IN:    Enteral Tube Flush: 380 mL    fentaNYL Infusion.: 36 mL    heparin Infusion: 159 mL    IV PiggyBack: 250 mL    norepinephrine Infusion: 332.4 mL    propofol Infusion: 297.6 mL    Vital High Protein: 520 mL  Total IN: 1975 mL    OUT:    Indwelling Catheter - Urethral: 1875 mL  Total OUT: 1875 mL    Total NET: 100 mL          LABS:                          9.2    12.61 )-----------( 240      ( 2020 04:30 )             30.5         2020 04:30    144    |  100    |  104    ----------------------------<  140    3.7     |  23     |  1.2      Ca    9.6        2020 04:30  Mg     2.6       2020 04:30    TPro  6.1    /  Alb  3.3    /  TBili  0.6    /  DBili  x      /  AST  31     /  ALT  14     /  AlkPhos  57     2020 04:30  Amylase x     lipase x                                                 PT/INR - ( 2020 04:30 )   PT: 12.40 sec;   INR: 1.08 ratio         PTT - ( 2020 04:30 )  PTT:66.4 sec                                       Urinalysis Basic - ( 2020 04:30 )    Color: Yellow / Appearance: Clear / S.015 / pH: x  Gluc: x / Ketone: Negative  / Bili: Negative / Urobili: <2 mg/dL   Blood: x / Protein: Negative / Nitrite: Negative   Leuk Esterase: Negative / RBC: x / WBC x   Sq Epi: x / Non Sq Epi: x / Bacteria: x                                                                Culture - Blood (collected 2020 11:00)  Source: .Blood Blood  Preliminary Report (2020 23:01):    No growth to date.                                                   Mode: AC/ CMV (Assist Control/ Continuous Mandatory Ventilation),PCV  RR (machine): 20  FiO2: 55  PEEP: 12  PC: 30  PIP: 30                                      ABG - ( 2020 02:30 )  pH, Arterial: 7.36  pH, Blood: x     /  pCO2: 39    /  pO2: 65    / HCO3: 22    / Base Excess: -3.0  /  SaO2: 90                  MEDICATIONS  (STANDING):  artificial tears (preservative free) Ophthalmic Solution 1 Drop(s) Both EYES two times a day  aspirin  chewable 81 milliGRAM(s) Oral daily  chlorhexidine 0.12% Liquid 15 milliLiter(s) Oral Mucosa two times a day  chlorhexidine 4% Liquid 1 Application(s) Topical <User Schedule>  dextrose 5%. 1000 milliLiter(s) (50 mL/Hr) IV Continuous <Continuous>  dextrose 50% Injectable 12.5 Gram(s) IV Push once  dextrose 50% Injectable 25 Gram(s) IV Push once  dextrose 50% Injectable 25 Gram(s) IV Push once  fentaNYL   Infusion. 0.5 MICROgram(s)/kG/Hr (5.9 mL/Hr) IV Continuous <Continuous>  folic acid 1 milliGRAM(s) Oral daily  furosemide   Injectable 40 milliGRAM(s) IV Push daily  heparin  Infusion 1900 Unit(s)/Hr (14 mL/Hr) IV Continuous <Continuous>  insulin glargine Injectable (LANTUS) 18 Unit(s) SubCutaneous at bedtime  insulin lispro (HumaLOG) corrective regimen sliding scale   SubCutaneous three times a day before meals  insulin lispro Injectable (HumaLOG) 6 Unit(s) SubCutaneous every 6 hours  investigational medication - IVPB 100 milliGRAM(s) IV Intermittent <User Schedule>  norepinephrine Infusion 0.05 MICROgram(s)/kG/Min (5.53 mL/Hr) IV Continuous <Continuous>  pantoprazole   Suspension 40 milliGRAM(s) Oral daily  polyethylene glycol 3350 17 Gram(s) Oral two times a day  propofol Infusion 20.056 MICROgram(s)/kG/Min (14.2 mL/Hr) IV Continuous <Continuous>  senna 2 Tablet(s) Oral at bedtime  vasopressin Infusion 0.02 Unit(s)/Min (1.2 mL/Hr) IV Continuous <Continuous>    MEDICATIONS  (PRN):  dextrose 40% Gel 15 Gram(s) Oral once PRN Blood Glucose LESS THAN 70 milliGRAM(s)/deciliter  glucagon  Injectable 1 milliGRAM(s) IntraMuscular once PRN Glucose LESS THAN 70 milligrams/deciliter          Xrays:  TLC:  OG:  ET tube:       deep                                                                             b/l opacity decrease      ECHO:  [facility] ICU:

## 2020-04-25 NOTE — PROGRESS NOTE ADULT - ASSESSMENT
70y F w/ PMH of asthma (never hospitalized, never intubated), CAD s/p stent (07/2018), HTN, HLD, and DM (diet-controlled) presents from [facility] w/ the chief complaint of shortness of breath, fevers, chills for 1 week and has been progressively worsening.    Impression   ARF hypoxic   -r/o COVID  -Suspected GNR PNA  viral PNA secondary   -Asthma Exacerbation  ARDS  BRIDGETTE    Plan     1.CNS  sedation vacation   add precedex and taper other     2. CVS     echo 66% moderate LVH , puln htn ?? rv dilatation        3. PULMONARY   lower fio 2 to 50  pressure 30/12       4. INFECTIOUS DISEASE     on HC  on angeli   follow ID    ANAKirna   on remdisiver   follow up Id recommendation     5. GI  -GI ppx, protonix  -once intubated,  OG feeds  check LFT     6. RENAL  -replete electrolytes PRN  follow renal   on lasix Q 24 hrs    monitor Is and OS     7. Endocrine   -f/u FS, target FS <180    8. Hematology   start heparin IV for now keep ptt 60 elevated d-dimer Rv dilatation   PA 58  9. DVT PROPHYLAXIS  heparin drip follow ptt kepp 60 elevated d-dimner     11. Code Status   -full code 70y F w/ PMH of asthma (never hospitalized, never intubated), CAD s/p stent (07/2018), HTN, HLD, and DM (diet-controlled) presents from [facility] w/ the chief complaint of shortness of breath, fevers, chills for 1 week and has been progressively worsening.    Impression   ARF hypoxic   -r/o COVID  -Suspected GNR PNA  viral PNA secondary   -Asthma Exacerbation  ARDS  BRIDGETTE    Plan     1.CNS  sedation vacation   add precedex and taper other     2. CVS     echo 66% moderate LVH , puln htn ?? rv dilatation        3. PULMONARY   lower fio 2 to 50  pressure 30/12   pull et tube 3 cm out     4. INFECTIOUS DISEASE     on HC  off angeli   follow ID    ANAKirna   on remdisiver   follow up Id recommendation     5. GI  -GI ppx, protonix  -once intubated,  OG feeds  check LFT     6. RENAL  -replete electrolytes PRN  follow renal   on lasix Q 24 hrs    monitor Is and OS     7. Endocrine   -f/u FS, target FS <180    8. Hematology   start heparin IV for now keep ptt 60 elevated d-dimer Rv dilatation   PA 58  9. DVT PROPHYLAXIS  heparin drip follow ptt kepp 60 elevated d-dimner     11. Code Status   -full code

## 2020-04-26 NOTE — PROGRESS NOTE ADULT - SUBJECTIVE AND OBJECTIVE BOX
JEREMIAHNIRMAL CASTRO  70y, Female  Allergy: No Known Allergies      CHIEF COMPLAINT: shortness of breath (2020 07:59)      INTERVAL EVENTS/HPI  - No acute events overnight  - T(F): , Max: 97.8 (20 @ 08:03)  - Tolerating medication  - WBC Count: 9.69 K/uL (20 @ 04:30)      ROS  unable to obtain history secondary to patient's mental status and/or sedation     FH non-contributory   Social Hx non-contributory    VITALS:  T(F): 97.8, Max: 97.8 (20 @ 08:03)  HR: 111  BP: --  RR: 25Vital Signs Last 24 Hrs  T(C): 36.6 (2020 08:03), Max: 36.6 (2020 08:03)  T(F): 97.8 (2020 08:03), Max: 97.8 (2020 08:03)  HR: 111 (2020 08:37) (71 - 111)  BP: --  BP(mean): --  RR: 25 (2020 08:37) (20 - 25)  SpO2: 95% (2020 08:37) (89% - 98%)    PHYSICAL EXAM:  see below       TESTS & MEASUREMENTS:                        8.8    9.69  )-----------( 206      ( 2020 04:30 )             29.6         143  |  101  |  100<HH>  ----------------------------<  133<H>  3.6   |  24  |  1.0    Ca    9.3      2020 04:30    TPro  5.9<L>  /  Alb  3.3<L>  /  TBili  0.7  /  DBili  x   /  AST  25  /  ALT  13  /  AlkPhos  56      eGFR if Non African American: 57 mL/min/1.73M2 (20 @ 04:30)  eGFR if African American: 67 mL/min/1.73M2 (20 @ 04:30)    LIVER FUNCTIONS - ( 2020 04:30 )  Alb: 3.3 g/dL / Pro: 5.9 g/dL / ALK PHOS: 56 U/L / ALT: 13 U/L / AST: 25 U/L / GGT: x           Urinalysis Basic - ( 2020 04:30 )    Color: Yellow / Appearance: Clear / S.015 / pH: x  Gluc: x / Ketone: Negative  / Bili: Negative / Urobili: <2 mg/dL   Blood: x / Protein: Negative / Nitrite: Negative   Leuk Esterase: Negative / RBC: x / WBC x   Sq Epi: x / Non Sq Epi: x / Bacteria: x        Culture - Blood (collected 20 @ 11:00)  Source: .Blood Blood  Preliminary Report (20 @ 23:01):    No growth to date.            INFECTIOUS DISEASES TESTING      RADIOLOGY & ADDITIONAL TESTS:  I have personally reviewed the last Chest xray  CXR      CT      CARDIOLOGY TESTING  12 Lead ECG:   Ventricular Rate 119 BPM    Atrial Rate 93 BPM    QRS Duration 134 ms    Q-T Interval 366 ms    QTC Calculation(Bezet) 514 ms    R Axis -57 degrees    T Axis 36 degrees    Diagnosis Line Atrial fibrillation with rapid ventricular response  Left axis deviation  Right bundle branch block  Inferior infarct , age undetermined  Anterolateral infarct , age undetermined  Abnormal ECG    Confirmed by Yuri Dorsey ([id number]) on 2020 7:53:50 AM (20 @ 04:49)  Transthoracic Echocardiogram:    EXAM:  2-D ECHO (TTE) COMPLETE        PROCEDURE DATE:  2020      INTERPRETATION:  REPORT:    TRANSTHORACIC ECHOCARDIOGRAM REPORT         Patient Name:   NIRMAL DANIELS Accession #: [id number]  Medical Rec #:  [id number]        Height:      65.0 in 165.1 cm  YOB: 1950        Weight:      260.0 lb 117.93 kg  Patient Age:    70 years         BSA:         2.21 m²  Patient Gender: F                BP:          92/47 mmHg       Date of Exam:        2020 3:22:25 PM  Referring Physician: [id number] ISABELL JENKINS  Sonographer:         Brandon Boo Physician:   Yuri Dorsey M.D.    Procedure:     Follow up or Limited Echocardiogram.  Indications:   I42.9 - Cardiomyopathy, unspecified  Diagnosis:     Cardiomyopathy, unspecified - I42.9  Study Details: Study quality was adversely affected due to the patient being                 intubated.         Summary:   1. LV Ejection Fraction by Cabrera's Method with a biplane EF of 66 %.   2. Moderately increased LV wall thickness.   3. Spectral Doppler shows impaired relaxation pattern of left ventricular myocardial filling (Grade I diastolic dysfunction).   4. Moderately enlarged right ventricle.   5. Moderately reduced RV systolic function.   6. Mild thickening and calcification of the anterior and posterior mitral valve leaflets.   7. Mitral annular calcification.   8. Sclerotic aortic valve with decreased opening.   9. There is aortic stenosis on the basis of 2D imaging. Severity of the AS cannot be evaluated on this limited study. If clinically indicated, consider additional imaging to assess aortic valve gradients.  10. Estimated pulmonary artery systolic pressure is 58.1 mmHg assuming a right atrial pressure of 10 mmHg, which is consistent with moderate pulmonary hypertension.    PHYSICIAN INTERPRETATION:  Left Ventricle: The left ventricular internal cavity size is normal. Left ventricular wall thickness is moderately increased. Spectral Doppler shows impaired relaxation pattern of left ventricular myocardial filling (Grade I diastolic dysfunction).  Right Ventricle: The right ventricular size is moderately enlarged. RV systolic function is moderately reduced.  Left Atrium: Moderately enlarged left atrium.  Right Atrium: Mildly enlarged right atrium.  Pericardium: There is no evidence of pericardial effusion.  Mitral Valve: Mild thickening and calcification of the anterior and posterior mitral valve leaflets. There is mitral annular calcification. Mild mitral valve regurgitation is seen.  Tricuspid Valve: Structurally normal tricuspid valve, with normal leaflet excursion. Mild tricuspid regurgitation is visualized. Estimated pulmonary artery systolic pressure is 58.1 mmHg assuming a right atrial pressure of 10 mmHg, which is consistent with moderate pulmonary hypertension.  Aortic Valve: The aortic valve was not well visualized. The aortic valve is trileaflet. Sclerotic aortic valve with decreased opening. No evidence of aortic valve regurgitation is seen. There is aortic stenosis on the basis of 2D imaging. Severity of the AS cannot be evaluated on this limited study. If clinically indicated, consider additional imaging to assess aortic valve gradients.  Pulmonic Valve: The pulmonic valve was not well visualized.  Aorta: The aortic root is normal in size and structure.  Venous: The inferior vena cava was normal sized, with respiratory size variation less than 50%.       2D AND M-MODE MEASUREMENTS (normal ranges within parentheses):  Left Ventricle:                  Normal   Aorta/Left Atrium:             Normal  IVSd (2D):              1.43 cm (0.7-1.1) AoV Cusp Separation: 1.93 cm (1.5-2.6)  LVPWd (2D):             1.30 cm (0.7-1.1) Left Atrium (Mmode): 5.16 cm (1.9-4.0)  LVIDd (2D):             4.11 cm (3.4-5.7)  LVIDs (2D):             2.32 cm  LV FS (2D):             43.6 %   (>25%)  Relative Wall Thickness  0.63    (<0.42)    SPECTRAL DOPPLER ANALYSIS:  LV DIASTOLIC FUNCTION:  MV Peak E: 0.84 m/s Decel Time: 409 msec  MV Peak A: 1.42 m/s  E/A Ratio: 0.59    MitralValve:  MV P1/2 Time: 118.63 msec  MV Area, PHT: 1.85 cm²    Tricuspid Valve and PA/RV Systolic Pressure: TR Max Velocity: 3.47 m/s RA Pressure: 10 mmHg RVSP/PASP: 58.1 mmHg       [id number] Yuri Dorsey M.D., Electronically signed on 2020 at 4:32:43 PM         *** Final ***                    YURI DORSEY MD  This document has been electronically signed. 2020  3:22PM             (20 @ 15:22)      MEDICATIONS  artificial tears (preservative free) Ophthalmic Solution 1  aspirin  chewable 81  chlorhexidine 0.12% Liquid 15  chlorhexidine 4% Liquid 1  dexMEDEtomidine Infusion 0.2  dextrose 5%. 1000  dextrose 50% Injectable 12.5  dextrose 50% Injectable 25  dextrose 50% Injectable 25  fentaNYL   Infusion. 0.5  folic acid 1  furosemide   Injectable 40  heparin  Infusion 1900  insulin glargine Injectable (LANTUS) 18  insulin lispro (HumaLOG) corrective regimen sliding scale   insulin lispro Injectable (HumaLOG) 6  investigational medication - IVPB 100  norepinephrine Infusion 0.05  pantoprazole   Suspension 40  polyethylene glycol 3350 17  propofol Infusion 20.056  senna 2  vasopressin Infusion 0.02      ANTIBIOTICS:      All available historical data has been reviewed

## 2020-04-26 NOTE — PROGRESS NOTE ADULT - ASSESSMENT
70y F w/ PMH of asthma (never hospitalized, never intubated), CAD s/p stent (07/2018), HTN, HLD, and DM (diet-controlled) presents from [facility] w/ the chief complaint of shortness of breath, fevers, chills for 1 week and has been progressively worsening.    Impression   ARF hypoxic   -r/o COVID  -Suspected GNR PNA  viral PNA secondary   -Asthma Exacerbation  ARDS  BRIDGETTE    Plan     1.CNS  sedation vacation   on precedex taper to assess mental status off fentanyl now     2. CVS     echo 66% moderate LVH , puln htn ?? rv dilatation        3. PULMONARY   lower fio 2 to 45 for now   pressure 30/12   pull et tube 1.5 -2 cm  cm out     4. INFECTIOUS DISEASE     on HC  off angeli   follow ID    ANAKirna   on remdisiver   follow up Id recommendation     5. GI  -GI ppx, protonix  -once intubated,  OG feeds  check LFT     6. RENAL  -replete electrolytes PRN  follow renal   on lasix Q 24 hrs    monitor Is and OS     7. Endocrine   -f/u FS, target FS <180    8. Hematology   start heparin IV for now keep ptt 60 elevated d-dimer Rv dilatation   PA 58  9. DVT PROPHYLAXIS  heparin drip follow ptt kepp 60 elevated d-dimner     11. Code Status   -full code

## 2020-04-26 NOTE — PROGRESS NOTE ADULT - SUBJECTIVE AND OBJECTIVE BOX
Patient is a 70y old  Female who presents with a chief complaint of shortness of breath (2020 14:04)      Over Night Events:  Patient seen and examined.   still vented on sedation     ROS:  See HPI    PHYSICAL EXAM    ICU Vital Signs Last 24 Hrs  T(C): 36.4 (2020 06:02), Max: 36.4 (2020 06:02)  T(F): 97.6 (2020 06:02), Max: 97.6 (2020 06:02)  HR: 91 (2020 06:02) (71 - 99)  BP: --  BP(mean): --  ABP: 138/57 (2020 06:02) (94/48 - 169/61)  ABP(mean): 68 (2020 15:30) (62 - 84)  RR: 20 (2020 06:02) (20 - 20)  SpO2: 98% (2020 06:02) (89% - 98%)      General: on sedation  HEENT:     et tube            Lymph Nodes: NO cervical LN   Lungs: Bilateral BS  Cardiovascular: Regular   Abdomen: Soft, Positive BS  Extremities: No clubbing   Skin: warm   Neurological: on sedation   Musculoskeletal: move all ext     I&O's Detail    2020 07:01  -  2020 07:00  --------------------------------------------------------  IN:    Enteral Tube Flush: 60 mL  Total IN: 60 mL    OUT:    Indwelling Catheter - Urethral: 1650 mL  Total OUT: 1650 mL    Total NET: -1590 mL          LABS:                          8.8    9.69  )-----------( 206      ( 2020 04:30 )             29.6         2020 04:30    143    |  101    |  100    ----------------------------<  133    3.6     |  24     |  1.0      Ca    9.3        2020 04:30    TPro  5.9    /  Alb  3.3    /  TBili  0.7    /  DBili  x      /  AST  25     /  ALT  13     /  AlkPhos  56     2020 04:30  Amylase x     lipase x                                                 PT/INR - ( 2020 04:30 )   PT: 11.60 sec;   INR: 1.01 ratio         PTT - ( 2020 04:30 )  PTT:56.4 sec                                       Urinalysis Basic - ( 2020 04:30 )    Color: Yellow / Appearance: Clear / S.015 / pH: x  Gluc: x / Ketone: Negative  / Bili: Negative / Urobili: <2 mg/dL   Blood: x / Protein: Negative / Nitrite: Negative   Leuk Esterase: Negative / RBC: x / WBC x   Sq Epi: x / Non Sq Epi: x / Bacteria: x                                                                                                             Mode: AC/ CMV (Assist Control/ Continuous Mandatory Ventilation)  RR (machine): 20  FiO2: 50  PEEP: 12  PC: 30  PIP: 30                                      ABG - ( 2020 02:01 )  pH, Arterial: 7.32  pH, Blood: x     /  pCO2: 50    /  pO2: 83    / HCO3: 26    / Base Excess: -0.5  /  SaO2: 95                  MEDICATIONS  (STANDING):  artificial tears (preservative free) Ophthalmic Solution 1 Drop(s) Both EYES two times a day  aspirin  chewable 81 milliGRAM(s) Oral daily  chlorhexidine 0.12% Liquid 15 milliLiter(s) Oral Mucosa two times a day  chlorhexidine 4% Liquid 1 Application(s) Topical <User Schedule>  dexMEDEtomidine Infusion 0.2 MICROgram(s)/kG/Hr (5.9 mL/Hr) IV Continuous <Continuous>  dextrose 5%. 1000 milliLiter(s) (50 mL/Hr) IV Continuous <Continuous>  dextrose 50% Injectable 12.5 Gram(s) IV Push once  dextrose 50% Injectable 25 Gram(s) IV Push once  dextrose 50% Injectable 25 Gram(s) IV Push once  fentaNYL   Infusion. 0.5 MICROgram(s)/kG/Hr (5.9 mL/Hr) IV Continuous <Continuous>  folic acid 1 milliGRAM(s) Oral daily  furosemide   Injectable 40 milliGRAM(s) IV Push daily  heparin  Infusion 1900 Unit(s)/Hr (13 mL/Hr) IV Continuous <Continuous>  insulin glargine Injectable (LANTUS) 18 Unit(s) SubCutaneous at bedtime  insulin lispro (HumaLOG) corrective regimen sliding scale   SubCutaneous three times a day before meals  insulin lispro Injectable (HumaLOG) 6 Unit(s) SubCutaneous every 6 hours  investigational medication - IVPB 100 milliGRAM(s) IV Intermittent <User Schedule>  norepinephrine Infusion 0.05 MICROgram(s)/kG/Min (5.53 mL/Hr) IV Continuous <Continuous>  pantoprazole   Suspension 40 milliGRAM(s) Oral daily  polyethylene glycol 3350 17 Gram(s) Oral two times a day  propofol Infusion 20.056 MICROgram(s)/kG/Min (14.2 mL/Hr) IV Continuous <Continuous>  senna 2 Tablet(s) Oral at bedtime  vasopressin Infusion 0.02 Unit(s)/Min (1.2 mL/Hr) IV Continuous <Continuous>    MEDICATIONS  (PRN):  dextrose 40% Gel 15 Gram(s) Oral once PRN Blood Glucose LESS THAN 70 milliGRAM(s)/deciliter  glucagon  Injectable 1 milliGRAM(s) IntraMuscular once PRN Glucose LESS THAN 70 milligrams/deciliter          Xrays:  TLC:  OG:  ET tube:     deep   decrease b/l opacity                                                                                   ECHO:  [facility] ICU:

## 2020-04-26 NOTE — CHART NOTE - NSCHARTNOTEFT_GEN_A_CORE
Spoke with Adolfo Herrera at [phone] updated him on patient's  status.   Answered questions, addressed concerns.

## 2020-04-26 NOTE — PROGRESS NOTE ADULT - ASSESSMENT
70y F w/ PMH of asthma (never hospitalized, never intubated), CAD s/p stent (07/2018), HTN, HLD, and DM (diet-controlled) presents from [facility] w/ the chief complaint of shortness of breath, fevers, chills for 1 week and has been progressively worsening.    IMPRESSION;   COVID19 with septic shock requiring pressors, resp failure, mechanical ventilation with ARDS with FiO2 80 %, secondary to Cytokine Release Syndrome  -inflammatory markers elevated  -elevated Ddimer is a poor prognostic indicator and differentiate survivors from non survivors  -procalcitonin of 0.65 > 0.47 suggestive of a bacterial PNA/infection   -was on azithro/ merem in [place]   -BCx NG  S/p PLAQUENIL   s/p Anakinra  4/15-18  d/c Solumedrol   off Meropenem   poor prognosis    RECOMMENDATIONS;  fidel PA  Remdesivir since 4/23. 10 days in all  Daily CBC BMP PT PTT UA  anticoagulation  NO NEED TO REPEAT INFLAMMATORY MARKERS

## 2020-04-27 NOTE — PROGRESS NOTE ADULT - ASSESSMENT
70y F w/ PMH of asthma (never hospitalized, never intubated), CAD s/p stent (07/2018), HTN, HLD, and DM (diet-controlled) presents from [facility] w/ the chief complaint of shortness of breath, fevers, chills for 1 week and has been progressively worsening.      # Hypoxic respiratory failure  - Intubated       - lower fiO2 to 50%, pressure 30/12, pulled ET tube 1.5 cm out   - Chest X-ray: stable b/l opacities   - Etiology may include:     > Given pandemic > COVID - 19       + COVID-19 PCR +ve      + Completed HCQ      + Kineret on 4/16      + Solumedrol 60mg IV q12h > dropped to 40qd on 4/16 and DCed on 4/24      + Started on Remdecivir on 4/23 for 10 days  - ID recs appreciated    - suspected GNR pneumonia viral pneumonia secondary     - s/p angeli vanco 	    - no need to repeat inflammatory markers   - RV dilated, PA pressure elevated, d-dimer elevated, possibility of PE    > Started on heparin therapeutic on 4/17    > DC plavix to decrease incidence of bleed, last stent placed >1 year ago    - f/u 11am cbc--> if H + H decreases hold heparin     # Afib  - transient episode of atrial fibrillation on morning of 4/23 > self resolved.    # BRIDGETTE   - Not known CKD, creatinine normal on admission   - c/w  Lasix 40mg IV qd  - DC vancomycin on 4/13  - f/u nephro     # Ischemic heart disease  - On DAPT on admission - Held aspirin after starting therapeutic anticoagulation on 4/17  - Metoprolol 50mg bid discontinued on 4/17 for hemodynamic stability  - On lasix 40mg QD   - Echo done showed LVEF 66%, mod RV dysfunction, PASP 58mmHg.     # DVT prophylaxis > On heparin therapeutic

## 2020-04-27 NOTE — PROGRESS NOTE ADULT - ASSESSMENT
70y F w/ PMH of asthma (never hospitalized, never intubated), CAD s/p stent (07/2018), HTN, HLD, and DM (diet-controlled) presents from [facility] w/ the chief complaint of shortness of breath, fevers, chills for 1 week and has been progressively worsening.    Impression   ARF hypoxic   -r/o COVID  -Suspected GNR PNA  viral PNA secondary   -Asthma Exacerbation  ARDS  BRIDGETTE    Plan     1.CNS  again sedation vacation   on precedex     2. CVS     echo 66% moderate LVH , puln htn ?? rv dilatation        3. PULMONARY   lower fio 2 to 50for now   pressure 30/12   pull et tube 1.5   cm out     4. INFECTIOUS DISEASE     on HC  off angeli   follow ID    s/p ANAKirna   on remdisiver   follow up Id recommendation     5. GI  -GI ppx, protonix  -once intubated,  OG feeds  check LFT     6. RENAL  -replete electrolytes PRN  follow renal   on lasix Q 24 hrs    monitor Is and OS     7. Endocrine   -f/u FS, target FS <180    8. Hematology follow h/h at 11 am if dropping hold heparin drip       11. Code Status   -full code

## 2020-04-27 NOTE — PROGRESS NOTE ADULT - SUBJECTIVE AND OBJECTIVE BOX
NIRMAL DANIELS  70y, Female    All available historical data reviewed    OVERNIGHT EVENTS:  no fevers  fio2 80%    ROS:  unable to obtain history secondary to patient's mental status and/or sedation     VITALS:  T(F): 98.6, Max: 99.2 (20 @ 22:00)  HR: 108  BP: --  RR: 22Vital Signs Last 24 Hrs  T(C): 37 (2020 08:10), Max: 37.3 (2020 22:00)  T(F): 98.6 (2020 08:10), Max: 99.2 (2020 22:00)  HR: 108 (2020 10:31) (66 - 126)  BP: --  BP(mean): --  RR: 22 (2020 08:10) (20 - 26)  SpO2: 97% (2020 10:31) (83% - 100%)    TESTS & MEASUREMENTS:                        9.0    13.20 )-----------( 267      ( 2020 11:05 )             29.8         148<H>  |  105  |  90<HH>  ----------------------------<  104<H>  3.4<L>   |  25  |  1.0    Ca    9.3      2020 05:00    TPro  5.9<L>  /  Alb  3.3<L>  /  TBili  0.7  /  DBili  x   /  AST  25  /  ALT  13  /  AlkPhos  56      LIVER FUNCTIONS - ( 2020 04:30 )  Alb: 3.3 g/dL / Pro: 5.9 g/dL / ALK PHOS: 56 U/L / ALT: 13 U/L / AST: 25 U/L / GGT: x             Culture - Blood (collected 20 @ 11:00)  Source: .Blood Blood  Preliminary Report (20 @ 23:01):    No growth to date.      Urinalysis Basic - ( 2020 13:10 )    Color: Light Yellow / Appearance: Clear / S.017 / pH: x  Gluc: x / Ketone: Negative  / Bili: Negative / Urobili: <2 mg/dL   Blood: x / Protein: Negative / Nitrite: Negative   Leuk Esterase: Negative / RBC: x / WBC x   Sq Epi: x / Non Sq Epi: x / Bacteria: x          RADIOLOGY & ADDITIONAL TESTS:  Personal review of radiological diagnostics performed  Echo and EKG results noted when applicable.     MEDICATIONS:  artificial tears (preservative free) Ophthalmic Solution 1 Drop(s) Both EYES two times a day  aspirin  chewable 81 milliGRAM(s) Oral daily  chlorhexidine 0.12% Liquid 15 milliLiter(s) Oral Mucosa two times a day  chlorhexidine 4% Liquid 1 Application(s) Topical <User Schedule>  dexMEDEtomidine Infusion 0.2 MICROgram(s)/kG/Hr IV Continuous <Continuous>  dextrose 40% Gel 15 Gram(s) Oral once PRN  dextrose 5%. 1000 milliLiter(s) IV Continuous <Continuous>  dextrose 50% Injectable 12.5 Gram(s) IV Push once  dextrose 50% Injectable 25 Gram(s) IV Push once  dextrose 50% Injectable 25 Gram(s) IV Push once  folic acid 1 milliGRAM(s) Oral daily  furosemide   Injectable 40 milliGRAM(s) IV Push daily  glucagon  Injectable 1 milliGRAM(s) IntraMuscular once PRN  heparin  Infusion 1900 Unit(s)/Hr IV Continuous <Continuous>  insulin glargine Injectable (LANTUS) 18 Unit(s) SubCutaneous at bedtime  insulin lispro (HumaLOG) corrective regimen sliding scale   SubCutaneous three times a day before meals  insulin lispro Injectable (HumaLOG) 6 Unit(s) SubCutaneous every 6 hours  investigational medication - IVPB 100 milliGRAM(s) IV Intermittent <User Schedule>  norepinephrine Infusion 0.05 MICROgram(s)/kG/Min IV Continuous <Continuous>  pantoprazole   Suspension 40 milliGRAM(s) Oral daily  polyethylene glycol 3350 17 Gram(s) Oral two times a day  propofol Infusion 20.056 MICROgram(s)/kG/Min IV Continuous <Continuous>  senna 2 Tablet(s) Oral at bedtime      ANTIBIOTICS:

## 2020-04-27 NOTE — PROGRESS NOTE ADULT - SUBJECTIVE AND OBJECTIVE BOX
SUBJECTIVE:    Patient is a 70y old  Female who presents with a chief complaint of shortness of breath (2020 07:58)    Currently admitted to medicine with the primary diagnosis of Asthma exacerbation     Today is hospital day 14d. Patient was seen and examined at bedside. This morning she is resting comfortably in bed and reports no new issues or overnight events.     PAST MEDICAL & SURGICAL HISTORY  PAST MEDICAL & SURGICAL HISTORY:  Depression  Anxiety  Dyslipidemia  Diabetes mellitus  Hypertension    SOCIAL HISTORY:    ALLERGIES:  No Known Allergies    MEDICATIONS:  STANDING MEDICATIONS  artificial tears (preservative free) Ophthalmic Solution 1 Drop(s) Both EYES two times a day  aspirin  chewable 81 milliGRAM(s) Oral daily  chlorhexidine 0.12% Liquid 15 milliLiter(s) Oral Mucosa two times a day  chlorhexidine 4% Liquid 1 Application(s) Topical <User Schedule>  dexMEDEtomidine Infusion 0.2 MICROgram(s)/kG/Hr IV Continuous <Continuous>  dextrose 5%. 1000 milliLiter(s) IV Continuous <Continuous>  dextrose 50% Injectable 12.5 Gram(s) IV Push once  dextrose 50% Injectable 25 Gram(s) IV Push once  dextrose 50% Injectable 25 Gram(s) IV Push once  folic acid 1 milliGRAM(s) Oral daily  furosemide   Injectable 40 milliGRAM(s) IV Push daily  heparin  Infusion 1900 Unit(s)/Hr IV Continuous <Continuous>  insulin glargine Injectable (LANTUS) 18 Unit(s) SubCutaneous at bedtime  insulin lispro (HumaLOG) corrective regimen sliding scale   SubCutaneous three times a day before meals  insulin lispro Injectable (HumaLOG) 6 Unit(s) SubCutaneous every 6 hours  investigational medication - IVPB 100 milliGRAM(s) IV Intermittent <User Schedule>  norepinephrine Infusion 0.05 MICROgram(s)/kG/Min IV Continuous <Continuous>  pantoprazole   Suspension 40 milliGRAM(s) Oral daily  polyethylene glycol 3350 17 Gram(s) Oral two times a day  propofol Infusion 20.056 MICROgram(s)/kG/Min IV Continuous <Continuous>  senna 2 Tablet(s) Oral at bedtime    PRN MEDICATIONS  dextrose 40% Gel 15 Gram(s) Oral once PRN  glucagon  Injectable 1 milliGRAM(s) IntraMuscular once PRN    VITALS:   T(F): 98.6  HR: 106  BP: --  RR: 22  SpO2: 100%    LABS:                        8.3    9.55  )-----------( 204      ( 2020 05:00 )             27.6     04    148<H>  |  105  |  90<HH>  ----------------------------<  104<H>  3.4<L>   |  25  |  1.0    Ca    9.3      2020 05:00    TPro  5.9<L>  /  Alb  3.3<L>  /  TBili  0.7  /  DBili  x   /  AST  25  /  ALT  13  /  AlkPhos  56  04-26    PT/INR - ( 2020 04:30 )   PT: 11.60 sec;   INR: 1.01 ratio         PTT - ( 2020 06:03 )  PTT:64.8 sec  Urinalysis Basic - ( 2020 14:34 )    Color: Light Yellow / Appearance: Clear / S.018 / pH: x  Gluc: x / Ketone: Negative  / Bili: Negative / Urobili: <2 mg/dL   Blood: x / Protein: Trace / Nitrite: Negative   Leuk Esterase: Negative / RBC: x / WBC x   Sq Epi: x / Non Sq Epi: x / Bacteria: x      ABG - ( 2020 02:53 )  pH, Arterial: 7.32  pH, Blood: x     /  pCO2: 51    /  pO2: 237   / HCO3: 26    / Base Excess: x     /  SaO2: 100                       RADIOLOGY:  < from: Xray Chest 1 View- PORTABLE-Routine (20 @ 04:10) >  Impression:      Stable bilateral opacifications. No pneumothorax.    < end of copied text >    PHYSICAL EXAM:  GENERAL: intubated, sedated, mechanically ventilated   HEAD: Atraumatic  CHEST/LUNG: diminished breath sounds b/l   HEART: S1, S2; RRR; No murmurs, rubs, or gallops  ABDOMEN: BS+; Soft   EXTREMITIES:  2+ Peripheral Pulses

## 2020-04-27 NOTE — PROGRESS NOTE ADULT - SUBJECTIVE AND OBJECTIVE BOX
Patient is a 70y old  Female who presents with a chief complaint of shortness of breath (2020 09:23)      Over Night Events:  Patient seen and examined.   back on propofol for agitation     ROS:  See HPI    PHYSICAL EXAM    ICU Vital Signs Last 24 Hrs  T(C): 37.3 (2020 06:00), Max: 37.3 (2020 22:00)  T(F): 99.1 (2020 06:00), Max: 99.2 (2020 22:00)  HR: 106 (2020 06:00) (66 - 130)  BP: --  BP(mean): --  ABP: 84/41 (2020 06:00) (84/41 - 149/87)  ABP(mean): 55 (2020 06:00) (55 - 101)  RR: 22 (2020 06:00) (20 - 28)  SpO2: 100% (2020 06:00) (83% - 100%)      General: on sedation   HEENT:     ett ube            Lymph Nodes: NO cervical LN   Lungs: Bilateral BS  Cardiovascular: Regular   Abdomen: Soft, Positive BS  Extremities: No clubbing   Skin: warm   Neurological:  positve gag   Musculoskeletal: move all ext     I&O's Detail    2020 07:01  -  2020 07:00  --------------------------------------------------------  IN:    dexmedetomidine Infusion: 58.2 mL    Enteral Tube Flush: 300 mL    heparin Infusion: 156 mL    IV PiggyBack: 250 mL    norepinephrine Infusion: 100.8 mL    propofol Infusion: 115 mL    Vital High Protein: 70 mL  Total IN: 1050 mL    OUT:    Indwelling Catheter - Urethral: 1875 mL  Total OUT: 1875 mL    Total NET: -825 mL          LABS:                          8.3    9.55  )-----------( 204      ( 2020 05:00 )             27.6         2020 05:00    148    |  105    |  90     ----------------------------<  104    3.4     |  25     |  1.0      Ca    9.3        2020 05:00                                               PT/INR - ( 2020 04:30 )   PT: 11.60 sec;   INR: 1.01 ratio         PTT - ( 2020 06:03 )  PTT:64.8 sec                                       Urinalysis Basic - ( 2020 14:34 )    Color: Light Yellow / Appearance: Clear / S.018 / pH: x  Gluc: x / Ketone: Negative  / Bili: Negative / Urobili: <2 mg/dL   Blood: x / Protein: Trace / Nitrite: Negative   Leuk Esterase: Negative / RBC: x / WBC x   Sq Epi: x / Non Sq Epi: x / Bacteria: x                                                                                                             Mode: IPAP 30  RR (machine): 20  FiO2: 100  PEEP: 12  PC: 30  PIP: 30                                      ABG - ( 2020 02:53 )  pH, Arterial: 7.32  pH, Blood: x     /  pCO2: 51    /  pO2: 237   / HCO3: 26    / Base Excess: x     /  SaO2: 100                 MEDICATIONS  (STANDING):  artificial tears (preservative free) Ophthalmic Solution 1 Drop(s) Both EYES two times a day  aspirin  chewable 81 milliGRAM(s) Oral daily  chlorhexidine 0.12% Liquid 15 milliLiter(s) Oral Mucosa two times a day  chlorhexidine 4% Liquid 1 Application(s) Topical <User Schedule>  dexMEDEtomidine Infusion 0.2 MICROgram(s)/kG/Hr (5.9 mL/Hr) IV Continuous <Continuous>  dextrose 5%. 1000 milliLiter(s) (50 mL/Hr) IV Continuous <Continuous>  dextrose 50% Injectable 12.5 Gram(s) IV Push once  dextrose 50% Injectable 25 Gram(s) IV Push once  dextrose 50% Injectable 25 Gram(s) IV Push once  folic acid 1 milliGRAM(s) Oral daily  furosemide   Injectable 40 milliGRAM(s) IV Push daily  heparin  Infusion 1900 Unit(s)/Hr (13 mL/Hr) IV Continuous <Continuous>  insulin glargine Injectable (LANTUS) 18 Unit(s) SubCutaneous at bedtime  insulin lispro (HumaLOG) corrective regimen sliding scale   SubCutaneous three times a day before meals  insulin lispro Injectable (HumaLOG) 6 Unit(s) SubCutaneous every 6 hours  investigational medication - IVPB 100 milliGRAM(s) IV Intermittent <User Schedule>  norepinephrine Infusion 0.05 MICROgram(s)/kG/Min (5.53 mL/Hr) IV Continuous <Continuous>  pantoprazole   Suspension 40 milliGRAM(s) Oral daily  polyethylene glycol 3350 17 Gram(s) Oral two times a day  propofol Infusion 20.056 MICROgram(s)/kG/Min (14.2 mL/Hr) IV Continuous <Continuous>  senna 2 Tablet(s) Oral at bedtime    MEDICATIONS  (PRN):  dextrose 40% Gel 15 Gram(s) Oral once PRN Blood Glucose LESS THAN 70 milliGRAM(s)/deciliter  glucagon  Injectable 1 milliGRAM(s) IntraMuscular once PRN Glucose LESS THAN 70 milligrams/deciliter          Xrays:  TLC:  OG:  ET tube:                                                                                    stable b/l opacity L>R   ECHO:  [facility] ICU:

## 2020-04-27 NOTE — CHART NOTE - NSCHARTNOTEFT_GEN_A_CORE
Spoke with Adolfo Herrera at [phone] updated him on patient's  status.   Answered questions, addressed concerns.

## 2020-04-27 NOTE — PROGRESS NOTE ADULT - ASSESSMENT
· Assessment		  70y F w/ PMH of asthma (never hospitalized, never intubated), CAD s/p stent (07/2018), HTN, HLD, and DM (diet-controlled) presents from [facility] w/ the chief complaint of shortness of breath, fevers, chills for 1 week and has been progressively worsening.    IMPRESSION;   COVID19 with resolved septic shock , resp failure, mechanical ventilation with ARDS with FiO2 80 %, secondary to Cytokine Release Syndrome  -inflammatory markers elevated  -elevated Ddimer is a poor prognostic indicator and differentiate survivors from non survivors  -procalcitonin of 0.65 > 0.47 suggestive of a bacterial PNA/infection   -was on azithro/ merem in [place]   -BCx NG  s/p Anakinra    RECOMMENDATIONS;  Remdesivir since 4/23. 10 days in all  Daily CBC BMP PT PTT UA  S/p PLAQUENIL   s/p Anakinra  4/15-18  poor prognosis  anticoagulation  NO NEED TO REPEAT INFLAMMATORY MARKERS

## 2020-04-28 NOTE — PROGRESS NOTE ADULT - SUBJECTIVE AND OBJECTIVE BOX
JEREMIAHNIRMAL CASTRO  70y, Female    All available historical data reviewed    OVERNIGHT EVENTS:  no fevers  no pressors  fio2 100%    ROS:  unable to obtain history secondary to patient's mental status and/or sedation     VITALS:  T(F): 98.6, Max: 99.4 (20 @ 18:42)  HR: 111  BP: --  RR: --Vital Signs Last 24 Hrs  T(C): 37 (2020 10:30), Max: 37.4 (2020 18:42)  T(F): 98.6 (2020 10:30), Max: 99.4 (2020 18:42)  HR: 111 (2020 10:30) (92 - 128)  BP: --  BP(mean): --  RR: --  SpO2: 100% (2020 10:30) (95% - 100%)    TESTS & MEASUREMENTS:                        7.9    7.61  )-----------( 190      ( 2020 04:30 )             27.0         145  |  105  |  90<HH>  ----------------------------<  64<L>  3.1<L>   |  26  |  1.0    Ca    8.2<L>      2020 04:30  Mg     2.4         TPro  5.4<L>  /  Alb  3.0<L>  /  TBili  0.8  /  DBili  x   /  AST  21  /  ALT  13  /  AlkPhos  48      LIVER FUNCTIONS - ( 2020 04:30 )  Alb: 3.0 g/dL / Pro: 5.4 g/dL / ALK PHOS: 48 U/L / ALT: 13 U/L / AST: 21 U/L / GGT: x             Culture - Blood (collected 20 @ 11:00)  Source: .Blood Blood  Final Report (20 @ 23:01):    No Growth Final      Urinalysis Basic - ( 2020 13:10 )    Color: Light Yellow / Appearance: Clear / S.017 / pH: x  Gluc: x / Ketone: Negative  / Bili: Negative / Urobili: <2 mg/dL   Blood: x / Protein: Negative / Nitrite: Negative   Leuk Esterase: Negative / RBC: x / WBC x   Sq Epi: x / Non Sq Epi: x / Bacteria: x          RADIOLOGY & ADDITIONAL TESTS:  Personal review of radiological diagnostics performed  Echo and EKG results noted when applicable.     MEDICATIONS:  artificial tears (preservative free) Ophthalmic Solution 1 Drop(s) Both EYES two times a day  aspirin  chewable 81 milliGRAM(s) Oral daily  chlorhexidine 0.12% Liquid 15 milliLiter(s) Oral Mucosa two times a day  chlorhexidine 4% Liquid 1 Application(s) Topical <User Schedule>  dexMEDEtomidine Infusion 0.2 MICROgram(s)/kG/Hr IV Continuous <Continuous>  dextrose 40% Gel 15 Gram(s) Oral once PRN  dextrose 5%. 1000 milliLiter(s) IV Continuous <Continuous>  dextrose 50% Injectable 12.5 Gram(s) IV Push once  dextrose 50% Injectable 25 Gram(s) IV Push once  dextrose 50% Injectable 25 Gram(s) IV Push once  folic acid 1 milliGRAM(s) Oral daily  furosemide   Injectable 40 milliGRAM(s) IV Push daily  glucagon  Injectable 1 milliGRAM(s) IntraMuscular once PRN  heparin  Infusion 1900 Unit(s)/Hr IV Continuous <Continuous>  insulin glargine Injectable (LANTUS) 18 Unit(s) SubCutaneous at bedtime  insulin lispro (HumaLOG) corrective regimen sliding scale   SubCutaneous three times a day before meals  insulin lispro Injectable (HumaLOG) 6 Unit(s) SubCutaneous every 6 hours  investigational medication - IVPB 100 milliGRAM(s) IV Intermittent <User Schedule>  norepinephrine Infusion 0.05 MICROgram(s)/kG/Min IV Continuous <Continuous>  pantoprazole   Suspension 40 milliGRAM(s) Oral daily  polyethylene glycol 3350 17 Gram(s) Oral two times a day  potassium chloride    Tablet ER 40 milliEquivalent(s) Oral every 4 hours  propofol Infusion 20.056 MICROgram(s)/kG/Min IV Continuous <Continuous>  senna 2 Tablet(s) Oral at bedtime      ANTIBIOTICS:

## 2020-04-28 NOTE — PROGRESS NOTE ADULT - SUBJECTIVE AND OBJECTIVE BOX
SUBJECTIVE:    Patient is a 70y old  Female who presents with a chief complaint of shortness of breath (2020 10:50)    Currently admitted to medicine with the primary diagnosis of Asthma exacerbation     Today is hospital day 15d. Patient was seen and examined at bedside. Sedated, intubated, and mechanically ventilated.     PAST MEDICAL & SURGICAL HISTORY  PAST MEDICAL & SURGICAL HISTORY:  Depression  Anxiety  Dyslipidemia  Diabetes mellitus  Hypertension    SOCIAL HISTORY:    ALLERGIES:  No Known Allergies    MEDICATIONS:  STANDING MEDICATIONS  artificial tears (preservative free) Ophthalmic Solution 1 Drop(s) Both EYES two times a day  aspirin  chewable 81 milliGRAM(s) Oral daily  chlorhexidine 0.12% Liquid 15 milliLiter(s) Oral Mucosa two times a day  chlorhexidine 4% Liquid 1 Application(s) Topical <User Schedule>  dexMEDEtomidine Infusion 0.2 MICROgram(s)/kG/Hr IV Continuous <Continuous>  dextrose 5%. 1000 milliLiter(s) IV Continuous <Continuous>  dextrose 50% Injectable 12.5 Gram(s) IV Push once  dextrose 50% Injectable 25 Gram(s) IV Push once  dextrose 50% Injectable 25 Gram(s) IV Push once  folic acid 1 milliGRAM(s) Oral daily  furosemide   Injectable 40 milliGRAM(s) IV Push daily  heparin  Infusion 1900 Unit(s)/Hr IV Continuous <Continuous>  insulin glargine Injectable (LANTUS) 18 Unit(s) SubCutaneous at bedtime  insulin lispro (HumaLOG) corrective regimen sliding scale   SubCutaneous three times a day before meals  insulin lispro Injectable (HumaLOG) 6 Unit(s) SubCutaneous every 6 hours  investigational medication - IVPB 100 milliGRAM(s) IV Intermittent <User Schedule>  norepinephrine Infusion 0.05 MICROgram(s)/kG/Min IV Continuous <Continuous>  pantoprazole   Suspension 40 milliGRAM(s) Oral daily  polyethylene glycol 3350 17 Gram(s) Oral two times a day  potassium chloride    Tablet ER 40 milliEquivalent(s) Oral every 4 hours  propofol Infusion 20.056 MICROgram(s)/kG/Min IV Continuous <Continuous>  senna 2 Tablet(s) Oral at bedtime    PRN MEDICATIONS  dextrose 40% Gel 15 Gram(s) Oral once PRN  glucagon  Injectable 1 milliGRAM(s) IntraMuscular once PRN    VITALS:   T(F): 99.1  HR: 102  BP: --  RR: --  SpO2: 100%    LABS:                        7.9    7.61  )-----------( 190      ( 2020 04:30 )             27.0         145  |  105  |  90<HH>  ----------------------------<  64<L>  3.1<L>   |  26  |  1.0    Ca    8.2<L>      2020 04:30  Mg     2.4         TPro  5.4<L>  /  Alb  3.0<L>  /  TBili  0.8  /  DBili  x   /  AST  21  /  ALT  13  /  AlkPhos  48  28    PT/INR - ( 2020 16:20 )   PT: 12.90 sec;   INR: 1.12 ratio         PTT - ( 2020 04:30 )  PTT:62.8 sec  Urinalysis Basic - ( 2020 13:10 )    Color: Light Yellow / Appearance: Clear / S.017 / pH: x  Gluc: x / Ketone: Negative  / Bili: Negative / Urobili: <2 mg/dL   Blood: x / Protein: Negative / Nitrite: Negative   Leuk Esterase: Negative / RBC: x / WBC x   Sq Epi: x / Non Sq Epi: x / Bacteria: x      ABG - ( 2020 10:32 )  pH, Arterial: 7.22  pH, Blood: x     /  pCO2: 60    /  pO2: 172   / HCO3: 25    / Base Excess: -3.1  /  SaO2: 100                       RADIOLOGY:  < from: Xray Chest 1 View- PORTABLE-Routine (20 @ 08:01) >  Impression:      Bilateral opacifications and effusions. Support devices as described.    Accounting for technique without change.    < end of copied text >    PHYSICAL EXAM:  GENERAL: NAD, speaks in full sentences, no signs of respiratory distress  HEAD: Atraumatic  CHEST/LUNG: diminished breath sounds b/l   HEART: S1, S2; RRR; No murmurs, rubs, or gallops  ABDOMEN: BS+; Soft, Non-tender, Non-distended  EXTREMITIES:  2+ Peripheral Pulses

## 2020-04-28 NOTE — CHART NOTE - NSCHARTNOTEFT_GEN_A_CORE
Spoke with Adolfo Herrera at [phone] updated him on patient's  status.   Answered questions, addressed concerns.

## 2020-04-28 NOTE — CHART NOTE - NSCHARTNOTEFT_GEN_A_CORE
Registered Dietitian Follow-Up     Patient Profile Reviewed                           Yes [x]   No []     Nutrition History Previously Obtained        Yes []  No [x]       Pertinent Subjective Information: Remains intubated/sedated. TF running. MAP 78     Pertinent Medical Interventions: Hypoxic respiratory failure. Sedation Vacation today. ARDS. BRIDGETTE.      Diet order: Vital HP @ 10ml/hr + no carb prosource TF pkts 3x/day (= 360 kcals, 53 gms protein, 194 ml free water)     Anthropometrics:  - Ht. 165.1cm  - Wt. dosing 118 kg/260 lbs  - %wt change no new wt since previous assessment  - BMI 43.9  - IBW 56.8 kg/125 lbs +/-10%     Pertinent Lab Data: (4/28) H/H 7.9/27.0  POCT glucose 118  K 3.1  BUN 90       Pertinent Meds: heparin, D5, lantus, humalog, KCL, precedex, miralax, senna, lasix, levophed, folic acid, protonix, propofol @ 10ml/hr = 264 kcals     Physical Findings:  - Appearance: obese; 1+ generalized edema  - GI function: LBM 4/26  - Tubes: OGT  - Oral/Mouth cavity: NPO  - Skin: WDL except skin tear     Nutrition Requirements  Weight Used: 118kg  Ve: 6.7 Tmax: 37.3       Estimated Energy Needs    Continue [x]  Adjust [] 1250 - 1500 kcals/day   1032 - 1209 kcals (60-70% [id number]) vs. 1250 - 1420 kcals (22-25 kcal/kg IBW) vs. 1298 - 1652 kcals (11-14 kcal/kg ABW)       Estimated Protein Needs    Continue [x] 114 - 125 gms/day (2.0 - 2.2 gm/kg IBW)     Estimated Fluid Needs        Continue [x] per LIP       Nutrient Intake: 624 kcals, 53 gms protein        [] Previous Nutrition Diagnosis: Inadequate protein/energy intake            [x] Ongoing          [] Resolved    [] No active nutrition diagnosis identified at this time     Nutrition Intervention EN      Goal/Expected Outcome: Pt to meet % of needs in 3 days     Indicator/Monitoring: RD to monitor energy intake, diet order, body comp, NFPF, glucose profile     Recommendation: D/t national shortage of Vital HP, change feeds to Peptamen Intense VHP @ 50ml/hr (provides 1200 kcals, 110 gms protein, 1008 ml free water) <-- does not include 264 kcals from propofol. Flushes per LIP. Hold feeds if MAP <65

## 2020-04-28 NOTE — PROGRESS NOTE ADULT - ASSESSMENT
· Assessment		  · Assessment		  70y F w/ PMH of asthma (never hospitalized, never intubated), CAD s/p stent (07/2018), HTN, HLD, and DM (diet-controlled) presents from [facility] w/ the chief complaint of shortness of breath, fevers, chills for 1 week and has been progressively worsening.    IMPRESSION;   COVID19 with resolved septic shock , resp failure, mechanical ventilation with ARDS with FiO2 100 %, secondary to Cytokine Release Syndrome  -inflammatory markers elevated  -elevated Ddimer is a poor prognostic indicator and differentiate survivors from non survivors  -procalcitonin of 0.65 > 0.47 suggestive of a bacterial PNA/infection   -was on azithro/ merem in [place]   -BCx NG  s/p Anakinra    RECOMMENDATIONS;  Remdesivir since 4/23. 10 days in all  Daily CBC BMP PT PTT UA  S/p PLAQUENIL   s/p Anakinra  4/15-18  poor prognosis  anticoagulation  NO NEED TO REPEAT INFLAMMATORY MARKERS

## 2020-04-28 NOTE — PROGRESS NOTE ADULT - SUBJECTIVE AND OBJECTIVE BOX
Patient is a 70y old  Female who presents with a chief complaint of shortness of breath (2020 15:25)      Over Night Events:  Patient seen and examined.   on sedation  precedex   propofol   and levo   ROS:  See HPI    PHYSICAL EXAM    ICU Vital Signs Last 24 Hrs  T(C): 37.1 (2020 06:14), Max: 37.4 (2020 18:42)  T(F): 98.7 (2020 06:14), Max: 99.4 (2020 18:42)  HR: 100 (2020 06:59) (92 - 114)  BP: --  BP(mean): --  ABP: 135/54 (2020 06:59) (95/43 - 135/54)  ABP(mean): --  RR: --  SpO2: 95% (2020 06:59) (95% - 100%)      General: on sedation   HEENT:       et tube          Lymph Nodes: NO cervical LN   Lungs: Bilateral BS  Cardiovascular: Regular   Abdomen: Soft, Positive BS  Extremities: No clubbing   Skin: warm   Neurological:  on sedation   Musculoskeletal: move all ext     I&O's Detail    2020 07:01  -  2020 07:00  --------------------------------------------------------  IN:    Enteral Tube Flush: 150 mL    Vital High Protein: 40 mL  Total IN: 190 mL    OUT:    Indwelling Catheter - Urethral: 900 mL  Total OUT: 900 mL    Total NET: -710 mL          LABS:                          7.9    7.61  )-----------( 190      ( 2020 04:30 )             27.0         2020 04:30    145    |  105    |  90     ----------------------------<  64     3.1     |  26     |  1.0      Ca    8.2        2020 04:30  Mg     2.4       2020 04:30    TPro  5.4    /  Alb  3.0    /  TBili  0.8    /  DBili  x      /  AST  21     /  ALT  13     /  AlkPhos  48     2020 04:30  Amylase x     lipase x                                                 PT/INR - ( 2020 16:20 )   PT: 12.90 sec;   INR: 1.12 ratio         PTT - ( 2020 04:30 )  PTT:62.8 sec                                       Urinalysis Basic - ( 2020 13:10 )    Color: Light Yellow / Appearance: Clear / S.017 / pH: x  Gluc: x / Ketone: Negative  / Bili: Negative / Urobili: <2 mg/dL   Blood: x / Protein: Negative / Nitrite: Negative   Leuk Esterase: Negative / RBC: x / WBC x   Sq Epi: x / Non Sq Epi: x / Bacteria: x                                                                                                             Mode: PCV  RR (machine): 20  FiO2:100  PEEP: 12  ITime: 1  PC: 30  PIP: 30                                      ABG - ( 2020 03:12 )  pH, Arterial: 7.23  pH, Blood: x     /  pCO2: 61    /  pO2: 171   / HCO3: 26    / Base Excess: -2.4  /  SaO2: 100                 MEDICATIONS  (STANDING):  artificial tears (preservative free) Ophthalmic Solution 1 Drop(s) Both EYES two times a day  aspirin  chewable 81 milliGRAM(s) Oral daily  chlorhexidine 0.12% Liquid 15 milliLiter(s) Oral Mucosa two times a day  chlorhexidine 4% Liquid 1 Application(s) Topical <User Schedule>  dexMEDEtomidine Infusion 0.2 MICROgram(s)/kG/Hr (5.9 mL/Hr) IV Continuous <Continuous>  dextrose 5%. 1000 milliLiter(s) (50 mL/Hr) IV Continuous <Continuous>  dextrose 50% Injectable 12.5 Gram(s) IV Push once  dextrose 50% Injectable 25 Gram(s) IV Push once  dextrose 50% Injectable 25 Gram(s) IV Push once  folic acid 1 milliGRAM(s) Oral daily  furosemide   Injectable 40 milliGRAM(s) IV Push daily  heparin  Infusion 1900 Unit(s)/Hr (13 mL/Hr) IV Continuous <Continuous>  insulin glargine Injectable (LANTUS) 18 Unit(s) SubCutaneous at bedtime  insulin lispro (HumaLOG) corrective regimen sliding scale   SubCutaneous three times a day before meals  insulin lispro Injectable (HumaLOG) 6 Unit(s) SubCutaneous every 6 hours  investigational medication - IVPB 100 milliGRAM(s) IV Intermittent <User Schedule>  norepinephrine Infusion 0.05 MICROgram(s)/kG/Min (5.53 mL/Hr) IV Continuous <Continuous>  pantoprazole   Suspension 40 milliGRAM(s) Oral daily  polyethylene glycol 3350 17 Gram(s) Oral two times a day  propofol Infusion 20.056 MICROgram(s)/kG/Min (14.2 mL/Hr) IV Continuous <Continuous>  senna 2 Tablet(s) Oral at bedtime    MEDICATIONS  (PRN):  dextrose 40% Gel 15 Gram(s) Oral once PRN Blood Glucose LESS THAN 70 milliGRAM(s)/deciliter  glucagon  Injectable 1 milliGRAM(s) IntraMuscular once PRN Glucose LESS THAN 70 milligrams/deciliter          Xrays:  TLC:  OG:  ET tube:                                                                                    b.l opacity   et tube deep    ECHO:  [facility] ICU:

## 2020-04-28 NOTE — PROGRESS NOTE ADULT - ASSESSMENT
70y F w/ PMH of asthma (never hospitalized, never intubated), CAD s/p stent (07/2018), HTN, HLD, and DM (diet-controlled) presents from [facility] w/ the chief complaint of shortness of breath, fevers, chills for 1 week and has been progressively worsening.    Impression   ARF hypoxic   -r/o COVID  -Suspected GNR PNA  viral PNA secondary   -Asthma Exacerbation  ARDS  BRIDGETTE    Plan     1.CNS  again sedation vacation   on precedex     2. CVS     echo 66% moderate LVH , puln htn ?? rv dilatation        3. PULMONARY   lower FIO2 to 80   increase pressure 35/11 on V 60    pull et tube 2  cm out     4. INFECTIOUS DISEASE     on HC  off angeli   follow ID    s/p ANAKirna   on remdisiver study   follow up Id recommendation     5. GI  -GI ppx, protonix  -once intubated,  OG feeds  check LFT     6. RENAL  -replete electrolytes PRN  follow renal   on lasix Q 24 hrs    monitor Is and OS     7. Endocrine   -f/u FS, target FS <180    8. Hematology follow h/h at 11 am if dropping hold heparin drip       11. Code Status   -full code

## 2020-04-29 NOTE — PROGRESS NOTE ADULT - ASSESSMENT
· Assessment		  70y F w/ PMH of asthma (never hospitalized, never intubated), CAD s/p stent (07/2018), HTN, HLD, and DM (diet-controlled) presents from [facility] w/ the chief complaint of shortness of breath, fevers, chills for 1 week and has been progressively worsening.    IMPRESSION;   COVID19 with resolved septic shock , resp failure, mechanical ventilation with ARDS with FiO2 100 %, secondary to Cytokine Release Syndrome  -inflammatory markers elevated  -elevated Ddimer is a poor prognostic indicator and differentiate survivors from non survivors  -procalcitonin of 0.65 > 0.47 suggestive of a bacterial PNA/infection   -was on azithro/ merem in [place]   -BCx NG  s/p Anakinra    RECOMMENDATIONS;  Remdesivir since 4/23. 10 days in all  Daily CBC BMP PT PTT UA  S/p PLAQUENIL   s/p Anakinra  4/15-18  poor prognosis  anticoagulation  NO NEED TO REPEAT INFLAMMATORY MARKERS

## 2020-04-29 NOTE — CHART NOTE - NSCHARTNOTEFT_GEN_A_CORE
Medical team will call Adolfo Herrera at [phone]  today and updated him on patient's  status.

## 2020-04-29 NOTE — PROGRESS NOTE ADULT - ASSESSMENT
70y F w/ PMH of asthma (never hospitalized, never intubated), CAD s/p stent (07/2018), HTN, HLD, and DM (diet-controlled) presents from [facility] w/ the chief complaint of shortness of breath, fevers, chills for 1 week and has been progressively worsening.      # Hypoxic respiratory failure  - Intubated       - FiO2 45%, pressure 28/10  - Chest X-ray: stable b/l opacities   - Etiology may include:     > Given pandemic > COVID - 19       + COVID-19 PCR +ve      + Completed HCQ      + Kineret on 4/16      + Solumedrol 60mg IV q12h > dropped to 40qd on 4/16 and DCed on 4/24      + Started on Remdecivir on 4/23 for 10 days  - ID recs appreciated    - suspected GNR pneumonia viral pneumonia secondary     - s/p angeli vanco 	    - no need to repeat inflammatory markers   - RV dilated, PA pressure elevated, d-dimer elevated, possibility of PE    > Started on heparin therapeutic on 4/17    > DC plavix to decrease incidence of bleed, last stent placed >1 year ago  - Sedation Vacation today     - opened eyes to her name, failed neuro assessment      - holding CT head for now due to response   - F/U EEG   - Discontinued Propofol due to acidosis, c/w fentanyl and Precedex       - f/u afternoon BMP for HCO3  - Currently only on Levo, d/c vaso  - f/u LFTs  - CBC this afternoon, if H + H falls stop heparin     # Afib  - transient episode of atrial fibrillation on morning of 4/23 > self resolved.    # BRIDGETTE   - Not known CKD, creatinine normal on admission   - c/w  Lasix 40mg IV qd  - DC vancomycin on 4/13  - f/u nephro   - IV fluids D5W 70cc, 250ml Free water through NGT q 4     # Ischemic heart disease  - On DAPT on admission - Held aspirin after starting therapeutic anticoagulation on 4/17  - Metoprolol 50mg bid discontinued on 4/17 for hemodynamic stability  - On lasix 40mg QD   - Echo done showed LVEF 66%, mod RV dysfunction, PASP 58mmHg.     # DVT prophylaxis: heparin drip

## 2020-04-29 NOTE — PROGRESS NOTE ADULT - SUBJECTIVE AND OBJECTIVE BOX
Patient is a 70y old  Female who presents with a chief complaint of shortness of breath (2020 13:10)      Over Night Events:  Patient seen and examined.   still vented on sedation   off sedation agitated     ROS:  See HPI    PHYSICAL EXAM    ICU Vital Signs Last 24 Hrs  T(C): 36.6 (2020 07:37), Max: 38.6 (2020 04:59)  T(F): 97.8 (2020 07:37), Max: 101.5 (2020 04:59)  HR: 94 (2020 07:37) (87 - 136)  BP: --  BP(mean): --  ABP: 107/57 (2020 07:37) (95/86 - 158/68)  ABP(mean): 71 (2020 07:37) (68 - 101)  RR: 28 (2020 07:37) (28 - 28)  SpO2: 99% (2020 07:37) (92% - 100%)      General: on sedation   HEENT:       et tube          Lymph Nodes: NO cervical LN   Lungs: Bilateral BS  Cardiovascular: Regular   Abdomen: Soft, Positive BS  Extremities: No clubbing   Skin: warm   Neurological:  on sedation   Musculoskeletal: move all ext     I&O's Detail    2020 07:01  -  2020 07:00  --------------------------------------------------------  IN:    dexmedetomidine Infusion: 209.5 mL    fentaNYL  Infusion: 809.1 mL    heparin Infusion: 177 mL    norepinephrine Infusion: 435 mL    ns in tub fed Peptamen Intense VHP: 850 mL    propofol Infusion: 368.2 mL    vasopressin Infusion: 6 mL    vasopressin Infusion: 3.6 mL    Vital High Protein: 70 mL  Total IN: 2928.3 mL    OUT:    Indwelling Catheter - Urethral: 2150 mL  Total OUT: 2150 mL    Total NET: 778.3 mL      2020 07:01  -  2020 08:31  --------------------------------------------------------  IN:    fentaNYL  Infusion: 5.9 mL    heparin Infusion: 16 mL    norepinephrine Infusion: 26.6 mL    ns in tub fed Peptamen Intense VHP: 50 mL    propofol Infusion: 35.4 mL    vasopressin Infusion: 1.2 mL  Total IN: 135.1 mL    OUT:  Total OUT: 0 mL    Total NET: 135.1 mL          LABS:                          7.6    5.75  )-----------( 189      ( 2020 05:10 )             23.9         2020 05:10    152    |  118    |  71     ----------------------------<  143    3.7     |  15     |  0.8      Ca    7.3        2020 05:10  Mg     1.9       2020 05:10    TPro  4.7    /  Alb  2.4    /  TBili  0.6    /  DBili  x      /  AST  16     /  ALT  10     /  AlkPhos  42     2020 05:10  Amylase x     lipase x                                                 PT/INR - ( 2020 05:10 )   PT: 13.40 sec;   INR: 1.17 ratio         PTT - ( 2020 22:09 )  PTT:54.8 sec                                       Urinalysis Basic - ( 2020 05:10 )    Color: Yellow / Appearance: Clear / S.022 / pH: x  Gluc: x / Ketone: Small  / Bili: Negative / Urobili: <2 mg/dL   Blood: x / Protein: Trace / Nitrite: Positive   Leuk Esterase: Moderate / RBC: 14 /HPF / WBC 17 /HPF   Sq Epi: x / Non Sq Epi: 3 /HPF / Bacteria: Many                                                                                                                                                 ABG - ( 2020 05:56 )  pH, Arterial: 7.48  pH, Blood: x     /  pCO2: 24    /  pO2: 143   / HCO3: 18    / Base Excess: -4.7  /  SaO2: 100     V60 35/10, rate 28 , 55 Fio2             MEDICATIONS  (STANDING):  artificial tears (preservative free) Ophthalmic Solution 1 Drop(s) Both EYES two times a day  aspirin  chewable 81 milliGRAM(s) Oral daily  chlorhexidine 0.12% Liquid 15 milliLiter(s) Oral Mucosa two times a day  chlorhexidine 4% Liquid 1 Application(s) Topical <User Schedule>  dexMEDEtomidine Infusion 0.2 MICROgram(s)/kG/Hr (5.9 mL/Hr) IV Continuous <Continuous>  dextrose 5%. 1000 milliLiter(s) (50 mL/Hr) IV Continuous <Continuous>  dextrose 50% Injectable 12.5 Gram(s) IV Push once  dextrose 50% Injectable 25 Gram(s) IV Push once  dextrose 50% Injectable 25 Gram(s) IV Push once  fentaNYL   Infusion 0.5 MICROgram(s)/kG/Hr (5.9 mL/Hr) IV Continuous <Continuous>  folic acid 1 milliGRAM(s) Oral daily  furosemide   Injectable 40 milliGRAM(s) IV Push daily  heparin  Infusion 1900 Unit(s)/Hr (16 mL/Hr) IV Continuous <Continuous>  insulin glargine Injectable (LANTUS) 18 Unit(s) SubCutaneous at bedtime  insulin lispro (HumaLOG) corrective regimen sliding scale   SubCutaneous three times a day before meals  insulin lispro Injectable (HumaLOG) 6 Unit(s) SubCutaneous every 6 hours  investigational medication - IVPB 100 milliGRAM(s) IV Intermittent <User Schedule>  norepinephrine Infusion 0.05 MICROgram(s)/kG/Min (11.1 mL/Hr) IV Continuous <Continuous>  pantoprazole   Suspension 40 milliGRAM(s) Oral daily  polyethylene glycol 3350 17 Gram(s) Oral two times a day  propofol Infusion 20.056 MICROgram(s)/kG/Min (14.2 mL/Hr) IV Continuous <Continuous>  senna 2 Tablet(s) Oral at bedtime  vasopressin Infusion 0.02 Unit(s)/Min (1.2 mL/Hr) IV Continuous <Continuous>    MEDICATIONS  (PRN):  acetaminophen   Tablet .. 650 milliGRAM(s) Oral every 6 hours PRN Temp greater or equal to 38C (100.4F)  dextrose 40% Gel 15 Gram(s) Oral once PRN Blood Glucose LESS THAN 70 milliGRAM(s)/deciliter  glucagon  Injectable 1 milliGRAM(s) IntraMuscular once PRN Glucose LESS THAN 70 milligrams/deciliter          Xrays:  TLC:  OG:  ET tube:       decrease opacity   et ok                                                                                 ECHO:  [facility] ICU:

## 2020-04-29 NOTE — PROGRESS NOTE ADULT - ASSESSMENT
70y F w/ PMH of asthma (never hospitalized, never intubated), CAD s/p stent (07/2018), HTN, HLD, and DM (diet-controlled) presents from [facility] w/ the chief complaint of shortness of breath, fevers, chills for 1 week and has been progressively worsening.    Impression   ARF hypoxic   -r/o COVID  -Suspected GNR PNA  viral PNA secondary   -Asthma Exacerbation  ARDS  BRIDGETTE    Plan     1.CNS  again hold sedation vacation   keep precedex   d/c prpofol because acidosis   EEG   do ct scan if not awake while off sedation  now off fentnayl and propofol open eyes   2. CVS     echo 66% moderate LVH , puln htn ?? rv dilatation        3. PULMONARY   lower FIO2 to 80   lower pressure to 28/10 was pulling 600 TV before   lower rate to 25        4. INFECTIOUS DISEASE     on HC  off angeli   follow ID    s/p ANAKirna   on remdisiver study   follow up Id recommendation     5. GI  -GI ppx, protonix  -once intubated,  OG feeds  check LFT   free water 250cc Q 4  hrs     6. RENAL  -replete electrolytes PRN  follow renal   on lasix Q 24 hrs    monitor Is and OS   start d5 w @70cc/ hr     BMP afternoon check NA , hco3     7. Endocrine   -f/u FS, target FS <180    8. Hematology follow h/h at 11 am if dropping hold heparin drip   follow h/h if less then 7.5 hold heparin and transfuse   repeat  d-dimer     11. Code Status   -full code

## 2020-04-29 NOTE — PROGRESS NOTE ADULT - SUBJECTIVE AND OBJECTIVE BOX
SUBJECTIVE:    Patient is a 70y old  Female who presents with a chief complaint of shortness of breath (2020 12:37)    Currently admitted to medicine with the primary diagnosis of Asthma exacerbation     Today is hospital day 16d. Patient was seen and examined at bedside. Sedated, Intubated, and mechanically ventilated.     PAST MEDICAL & SURGICAL HISTORY  PAST MEDICAL & SURGICAL HISTORY:  Depression  Anxiety  Dyslipidemia  Diabetes mellitus  Hypertension    SOCIAL HISTORY:    ALLERGIES:  No Known Allergies    MEDICATIONS:  STANDING MEDICATIONS  artificial tears (preservative free) Ophthalmic Solution 1 Drop(s) Both EYES two times a day  aspirin  chewable 81 milliGRAM(s) Oral daily  chlorhexidine 0.12% Liquid 15 milliLiter(s) Oral Mucosa two times a day  chlorhexidine 4% Liquid 1 Application(s) Topical <User Schedule>  dexMEDEtomidine Infusion 0.2 MICROgram(s)/kG/Hr IV Continuous <Continuous>  dextrose 5%. 1000 milliLiter(s) IV Continuous <Continuous>  dextrose 5%. 1000 milliLiter(s) IV Continuous <Continuous>  dextrose 50% Injectable 12.5 Gram(s) IV Push once  dextrose 50% Injectable 25 Gram(s) IV Push once  dextrose 50% Injectable 25 Gram(s) IV Push once  fentaNYL   Infusion 0.5 MICROgram(s)/kG/Hr IV Continuous <Continuous>  folic acid 1 milliGRAM(s) Oral daily  furosemide   Injectable 40 milliGRAM(s) IV Push daily  heparin  Infusion 1900 Unit(s)/Hr IV Continuous <Continuous>  insulin glargine Injectable (LANTUS) 18 Unit(s) SubCutaneous at bedtime  insulin lispro (HumaLOG) corrective regimen sliding scale   SubCutaneous three times a day before meals  insulin lispro Injectable (HumaLOG) 6 Unit(s) SubCutaneous every 6 hours  investigational medication - IVPB 100 milliGRAM(s) IV Intermittent <User Schedule>  norepinephrine Infusion 0.05 MICROgram(s)/kG/Min IV Continuous <Continuous>  pantoprazole   Suspension 40 milliGRAM(s) Oral daily  polyethylene glycol 3350 17 Gram(s) Oral two times a day  senna 2 Tablet(s) Oral at bedtime    PRN MEDICATIONS  acetaminophen   Tablet .. 650 milliGRAM(s) Oral every 6 hours PRN  dextrose 40% Gel 15 Gram(s) Oral once PRN  glucagon  Injectable 1 milliGRAM(s) IntraMuscular once PRN    VITALS:   T(F): 97.8  HR: 104  BP: --  RR: 25  SpO2: 100%    LABS:                        7.6    5.75  )-----------( 189      ( 2020 05:10 )             23.9         152<H>  |  118<H>  |  71<HH>  ----------------------------<  143<H>  3.7   |  15<L>  |  0.8    Ca    7.3<L>      2020 05:10  Mg     1.9         TPro  4.7<L>  /  Alb  2.4<L>  /  TBili  0.6  /  DBili  x   /  AST  16  /  ALT  10  /  AlkPhos  42      PT/INR - ( 2020 05:10 )   PT: 13.40 sec;   INR: 1.17 ratio         PTT - ( 2020 08:42 )  PTT:73.4 sec  Urinalysis Basic - ( 2020 05:10 )    Color: Yellow / Appearance: Clear / S.022 / pH: x  Gluc: x / Ketone: Small  / Bili: Negative / Urobili: <2 mg/dL   Blood: x / Protein: Trace / Nitrite: Positive   Leuk Esterase: Moderate / RBC: 14 /HPF / WBC 17 /HPF   Sq Epi: x / Non Sq Epi: 3 /HPF / Bacteria: Many      ABG - ( 2020 05:56 )  pH, Arterial: 7.48  pH, Blood: x     /  pCO2: 24    /  pO2: 143   / HCO3: 18    / Base Excess: -4.7  /  SaO2: 100               Creatine Kinase, Serum: 18 U/L (20 @ 08:35)      CARDIAC MARKERS ( 2020 08:35 )  x     / x     / 18 U/L / x     / x          RADIOLOGY:    < from: Xray Chest 1 View- PORTABLE-Routine (20 @ 05:54) >  Impression:      Stable bilateral opacities.    Support devices as described.    < end of copied text >    PHYSICAL EXAM:  GENERAL: sedated, intubated, mechanically ventilated   HEAD: Atraumatic  CHEST/LUNG: diminished breath sounds b/l   HEART: S1, S2; RRR; No murmurs, rubs, or gallops  ABDOMEN: BS+; Soft  EXTREMITIES:  2+ Peripheral Pulses

## 2020-04-29 NOTE — PROGRESS NOTE ADULT - SUBJECTIVE AND OBJECTIVE BOX
JEREMIAHNIRMAL CASTRO  70y, Female    All available historical data reviewed    OVERNIGHT EVENTS:  fevers  fio2 100%    ROS:  unable to obtain history secondary to patient's mental status and/or sedation     VITALS:  T(F): 97.8, Max: 101.5 (-20 @ 04:59)  HR: 105  BP: --  RR: 28Vital Signs Last 24 Hrs  T(C): 36.6 (2020 07:37), Max: 38.6 (2020 04:59)  T(F): 97.8 (2020 07:37), Max: 101.5 (2020 04:59)  HR: 105 (2020 11:59) (87 - 136)  BP: --  BP(mean): --  RR: 28 (2020 11:59) (28 - 28)  SpO2: 100% (2020 11:59) (92% - 100%)    TESTS & MEASUREMENTS:                        7.6    5.75  )-----------( 189      ( 2020 05:10 )             23.9         152<H>  |  118<H>  |  71<HH>  ----------------------------<  143<H>  3.7   |  15<L>  |  0.8    Ca    7.3<L>      2020 05:10  Mg     1.9         TPro  4.7<L>  /  Alb  2.4<L>  /  TBili  0.6  /  DBili  x   /  AST  16  /  ALT  10  /  AlkPhos  42      LIVER FUNCTIONS - ( 2020 05:10 )  Alb: 2.4 g/dL / Pro: 4.7 g/dL / ALK PHOS: 42 U/L / ALT: 10 U/L / AST: 16 U/L / GGT: x             Urinalysis Basic - ( 2020 05:10 )    Color: Yellow / Appearance: Clear / S.022 / pH: x  Gluc: x / Ketone: Small  / Bili: Negative / Urobili: <2 mg/dL   Blood: x / Protein: Trace / Nitrite: Positive   Leuk Esterase: Moderate / RBC: 14 /HPF / WBC 17 /HPF   Sq Epi: x / Non Sq Epi: 3 /HPF / Bacteria: Many          RADIOLOGY & ADDITIONAL TESTS:  Personal review of radiological diagnostics performed  Echo and EKG results noted when applicable.     MEDICATIONS:  acetaminophen   Tablet .. 650 milliGRAM(s) Oral every 6 hours PRN  artificial tears (preservative free) Ophthalmic Solution 1 Drop(s) Both EYES two times a day  aspirin  chewable 81 milliGRAM(s) Oral daily  chlorhexidine 0.12% Liquid 15 milliLiter(s) Oral Mucosa two times a day  chlorhexidine 4% Liquid 1 Application(s) Topical <User Schedule>  dexMEDEtomidine Infusion 0.2 MICROgram(s)/kG/Hr IV Continuous <Continuous>  dextrose 40% Gel 15 Gram(s) Oral once PRN  dextrose 5%. 1000 milliLiter(s) IV Continuous <Continuous>  dextrose 5%. 1000 milliLiter(s) IV Continuous <Continuous>  dextrose 50% Injectable 12.5 Gram(s) IV Push once  dextrose 50% Injectable 25 Gram(s) IV Push once  dextrose 50% Injectable 25 Gram(s) IV Push once  fentaNYL   Infusion 0.5 MICROgram(s)/kG/Hr IV Continuous <Continuous>  folic acid 1 milliGRAM(s) Oral daily  furosemide   Injectable 40 milliGRAM(s) IV Push daily  glucagon  Injectable 1 milliGRAM(s) IntraMuscular once PRN  heparin  Infusion 1900 Unit(s)/Hr IV Continuous <Continuous>  insulin glargine Injectable (LANTUS) 18 Unit(s) SubCutaneous at bedtime  insulin lispro (HumaLOG) corrective regimen sliding scale   SubCutaneous three times a day before meals  insulin lispro Injectable (HumaLOG) 6 Unit(s) SubCutaneous every 6 hours  investigational medication - IVPB 100 milliGRAM(s) IV Intermittent <User Schedule>  norepinephrine Infusion 0.05 MICROgram(s)/kG/Min IV Continuous <Continuous>  pantoprazole   Suspension 40 milliGRAM(s) Oral daily  polyethylene glycol 3350 17 Gram(s) Oral two times a day  senna 2 Tablet(s) Oral at bedtime      ANTIBIOTICS:

## 2020-04-30 NOTE — CHART NOTE - NSCHARTNOTEFT_GEN_A_CORE
Medical team will call Adolfo Herrera at [phone]  today and updated him on patient's  status.  If no answer from above, may also call [phone] (wife cell) to reach Adolfo

## 2020-04-30 NOTE — PROGRESS NOTE ADULT - SUBJECTIVE AND OBJECTIVE BOX
SUBJECTIVE:    Patient is a 70y old  Female who presents with a chief complaint of shortness of breath (2020 12:09)    Currently admitted to medicine with the primary diagnosis of Asthma exacerbation     Today is hospital day 17d. Patient was seen and examined at bedside. Sedated, intubated, mechanically ventilated    PAST MEDICAL & SURGICAL HISTORY  PAST MEDICAL & SURGICAL HISTORY:  Depression  Anxiety  Dyslipidemia  Diabetes mellitus  Hypertension    SOCIAL HISTORY:    ALLERGIES:  No Known Allergies    MEDICATIONS:  STANDING MEDICATIONS  artificial tears (preservative free) Ophthalmic Solution 1 Drop(s) Both EYES two times a day  aspirin  chewable 81 milliGRAM(s) Oral daily  chlorhexidine 0.12% Liquid 15 milliLiter(s) Oral Mucosa two times a day  chlorhexidine 4% Liquid 1 Application(s) Topical <User Schedule>  dexMEDEtomidine Infusion 0.2 MICROgram(s)/kG/Hr IV Continuous <Continuous>  dextrose 50% Injectable 12.5 Gram(s) IV Push once  dextrose 50% Injectable 25 Gram(s) IV Push once  dextrose 50% Injectable 25 Gram(s) IV Push once  fentaNYL   Infusion 0.5 MICROgram(s)/kG/Hr IV Continuous <Continuous>  folic acid 1 milliGRAM(s) Oral daily  furosemide   Injectable 40 milliGRAM(s) IV Push daily  insulin glargine Injectable (LANTUS) 18 Unit(s) SubCutaneous at bedtime  insulin lispro (HumaLOG) corrective regimen sliding scale   SubCutaneous three times a day before meals  insulin lispro Injectable (HumaLOG) 6 Unit(s) SubCutaneous every 6 hours  investigational medication - IVPB 100 milliGRAM(s) IV Intermittent <User Schedule>  norepinephrine Infusion 0.05 MICROgram(s)/kG/Min IV Continuous <Continuous>  pantoprazole   Suspension 40 milliGRAM(s) Oral daily  polyethylene glycol 3350 17 Gram(s) Oral two times a day  senna 2 Tablet(s) Oral at bedtime    PRN MEDICATIONS  acetaminophen   Tablet .. 650 milliGRAM(s) Oral every 6 hours PRN  dextrose 40% Gel 15 Gram(s) Oral once PRN  glucagon  Injectable 1 milliGRAM(s) IntraMuscular once PRN    VITALS:   T(F): 99.9  HR: 96  BP: --  RR: 25  SpO2: 94%    LABS:                        7.1    5.58  )-----------( 173      ( 2020 05:00 )             22.8     04-30    138  |  107  |  67<HH>  ----------------------------<  154<H>  3.4<L>   |  19  |  0.7    Ca    8.3<L>      2020 05:00  Mg     1.9         TPro  5.0<L>  /  Alb  2.6<L>  /  TBili  0.6  /  DBili  0.5<H>  /  AST  13  /  ALT  8   /  AlkPhos  49      PT/INR - ( 2020 05:00 )   PT: 12.50 sec;   INR: 1.09 ratio         PTT - ( 2020 05:00 )  PTT:27.5 sec  Urinalysis Basic - ( 2020 05:00 )    Color: Yellow / Appearance: Clear / S.020 / pH: x  Gluc: x / Ketone: Negative  / Bili: Negative / Urobili: <2 mg/dL   Blood: x / Protein: Trace / Nitrite: Positive   Leuk Esterase: Negative / RBC: 2 /HPF / WBC 3 /HPF   Sq Epi: x / Non Sq Epi: 1 /HPF / Bacteria: Many      ABG - ( 2020 02:38 )  pH, Arterial: 7.42  pH, Blood: x     /  pCO2: 33    /  pO2: 105   / HCO3: 22    / Base Excess: -2.4  /  SaO2: 99                Creatine Kinase, Serum: 21 U/L (20 @ 05:00)  Creatine Kinase, Serum: 25 U/L (20 @ 16:30)      CARDIAC MARKERS ( 2020 05:00 )  x     / x     / 21 U/L / x     / x      CARDIAC MARKERS ( 2020 16:30 )  x     / x     / 25 U/L / x     / x      CARDIAC MARKERS ( 2020 08:35 )  x     / x     / 18 U/L / x     / x          RADIOLOGY:  < from: CT Abdomen and Pelvis No Cont (20 @ 10:58) >  IMPRESSION:    1.  No evidence of retroperitoneal hematoma or other acute/inflammatory process within the abdomen or pelvis.    2.  Colonic diverticulosis without evidence of acute diverticulitis.    3.  Partially imaged diffuse bibiasilar groundglass and consolidative opacities, consistent with atypical/viral pneumonia in the appropriate clinical setting.    < end of copied text >    PHYSICAL EXAM:  GENERAL: NAD, speaks in full sentences, no signs of respiratory distress  HEAD: Atraumatic  CHEST/LUNG: diminished breath sounds b/l   HEART: S1, S2; RRR; No murmurs, rubs, or gallops  ABDOMEN: BS+; Soft  EXTREMITIES:  2+ Peripheral Pulses SUBJECTIVE:    Patient is a 70y old  Female who presents with a chief complaint of shortness of breath (2020 12:09)    Currently admitted to medicine with the primary diagnosis of Asthma exacerbation     Today is hospital day 17d. Patient was seen and examined at bedside. Sedated, intubated, mechanically ventilated    PAST MEDICAL & SURGICAL HISTORY  PAST MEDICAL & SURGICAL HISTORY:  Depression  Anxiety  Dyslipidemia  Diabetes mellitus  Hypertension    SOCIAL HISTORY:    ALLERGIES:  No Known Allergies    MEDICATIONS:  STANDING MEDICATIONS  artificial tears (preservative free) Ophthalmic Solution 1 Drop(s) Both EYES two times a day  aspirin  chewable 81 milliGRAM(s) Oral daily  chlorhexidine 0.12% Liquid 15 milliLiter(s) Oral Mucosa two times a day  chlorhexidine 4% Liquid 1 Application(s) Topical <User Schedule>  dexMEDEtomidine Infusion 0.2 MICROgram(s)/kG/Hr IV Continuous <Continuous>  dextrose 50% Injectable 12.5 Gram(s) IV Push once  dextrose 50% Injectable 25 Gram(s) IV Push once  dextrose 50% Injectable 25 Gram(s) IV Push once  fentaNYL   Infusion 0.5 MICROgram(s)/kG/Hr IV Continuous <Continuous>  folic acid 1 milliGRAM(s) Oral daily  furosemide   Injectable 40 milliGRAM(s) IV Push daily  insulin glargine Injectable (LANTUS) 18 Unit(s) SubCutaneous at bedtime  insulin lispro (HumaLOG) corrective regimen sliding scale   SubCutaneous three times a day before meals  insulin lispro Injectable (HumaLOG) 6 Unit(s) SubCutaneous every 6 hours  investigational medication - IVPB 100 milliGRAM(s) IV Intermittent <User Schedule>  norepinephrine Infusion 0.05 MICROgram(s)/kG/Min IV Continuous <Continuous>  pantoprazole   Suspension 40 milliGRAM(s) Oral daily  polyethylene glycol 3350 17 Gram(s) Oral two times a day  senna 2 Tablet(s) Oral at bedtime    PRN MEDICATIONS  acetaminophen   Tablet .. 650 milliGRAM(s) Oral every 6 hours PRN  dextrose 40% Gel 15 Gram(s) Oral once PRN  glucagon  Injectable 1 milliGRAM(s) IntraMuscular once PRN    VITALS:   T(F): 99.9  HR: 96  BP: --  RR: 25  SpO2: 94%    LABS:                        7.1    5.58  )-----------( 173      ( 2020 05:00 )             22.8     04-30    138  |  107  |  67<HH>  ----------------------------<  154<H>  3.4<L>   |  19  |  0.7    Ca    8.3<L>      2020 05:00  Mg     1.9         TPro  5.0<L>  /  Alb  2.6<L>  /  TBili  0.6  /  DBili  0.5<H>  /  AST  13  /  ALT  8   /  AlkPhos  49      PT/INR - ( 2020 05:00 )   PT: 12.50 sec;   INR: 1.09 ratio         PTT - ( 2020 05:00 )  PTT:27.5 sec  Urinalysis Basic - ( 2020 05:00 )    Color: Yellow / Appearance: Clear / S.020 / pH: x  Gluc: x / Ketone: Negative  / Bili: Negative / Urobili: <2 mg/dL   Blood: x / Protein: Trace / Nitrite: Positive   Leuk Esterase: Negative / RBC: 2 /HPF / WBC 3 /HPF   Sq Epi: x / Non Sq Epi: 1 /HPF / Bacteria: Many      ABG - ( 2020 02:38 )  pH, Arterial: 7.42  pH, Blood: x     /  pCO2: 33    /  pO2: 105   / HCO3: 22    / Base Excess: -2.4  /  SaO2: 99                Creatine Kinase, Serum: 21 U/L (20 @ 05:00)  Creatine Kinase, Serum: 25 U/L (20 @ 16:30)      CARDIAC MARKERS ( 2020 05:00 )  x     / x     / 21 U/L / x     / x      CARDIAC MARKERS ( 2020 16:30 )  x     / x     / 25 U/L / x     / x      CARDIAC MARKERS ( 2020 08:35 )  x     / x     / 18 U/L / x     / x          RADIOLOGY:  < from: CT Abdomen and Pelvis No Cont (20 @ 10:58) >  IMPRESSION:    1.  No evidence of retroperitoneal hematoma or other acute/inflammatory process within the abdomen or pelvis.    2.  Colonic diverticulosis without evidence of acute diverticulitis.    3.  Partially imaged diffuse bibiasilar groundglass and consolidative opacities, consistent with atypical/viral pneumonia in the appropriate clinical setting.    < end of copied text >    PHYSICAL EXAM:  GENERAL: Sedated, intubated   HEAD: Atraumatic  CHEST/LUNG: diminished breath sounds b/l   HEART: S1, S2; RRR; No murmurs, rubs, or gallops  ABDOMEN: BS+; Soft  EXTREMITIES:  2+ Peripheral Pulses

## 2020-04-30 NOTE — PROGRESS NOTE ADULT - ASSESSMENT
70y F w/ PMH of asthma (never hospitalized, never intubated), CAD s/p stent (07/2018), HTN, HLD, and DM (diet-controlled) presents from [facility] w/ the chief complaint of shortness of breath, fevers, chills for 1 week and has been progressively worsening.    Impression   ARF hypoxic   -r/o COVID  -Suspected GNR PNA  viral PNA secondary   -Asthma Exacerbation  ARDS  BRIDGETTE    Plan     1.CNS  again hold sedation vacation   keep precedex   do ct scan of brain     now off fentnayl and propofol open eyes   2. CVS     echo 66% moderate LVH , puln htn ?? rv dilatation        3. PULMONARY   lower FIO2 to60    pressure to 28/10 was on V60      CALL Dr RYAN FOR TRACHEOSTOMY       4. INFECTIOUS DISEASE     on HC  off angeli   follow ID    s/p [id number]   on remdisiver study   follow up Id recommendation     5. GI  -GI ppx, protonix  check LFT   free water 250cc Q 4  hrs     6. RENAL  -replete electrolytes PRN  follow renal   on lasix Q 24 hrs    monitor Is and OS   d/c  d5 w @70cc/ hr     7. Endocrine   -f/u FS, target FS <180    8. Hematology  transfuse 1 unit of prbc   do ct aBD R/O RETRO BL;EED   follow h/h   11. Code Status   -full code

## 2020-04-30 NOTE — PROGRESS NOTE ADULT - ASSESSMENT
70y F w/ PMH of asthma (never hospitalized, never intubated), CAD s/p stent (07/2018), HTN, HLD, and DM (diet-controlled) presents from [facility] w/ the chief complaint of shortness of breath, fevers, chills for 1 week and has been progressively worsening.      # Hypoxic respiratory failure  - Intubated       - FiO2 50%, pressure 28/10  - Chest X-ray: stable b/l opacities   - Etiology may include:     > Given pandemic > COVID - 19       + COVID-19 PCR +ve      + Completed HCQ      + Kineret on 4/16      + Solumedrol 60mg IV q12h > dropped to 40qd on 4/16 and DCed on 4/24      + Started on Remdecivir on 4/23 for 10 days  - ID recs appreciated    - suspected GNR pneumonia viral pneumonia secondary     - s/p angeli vanco 	    - no need to repeat inflammatory markers   - RV dilated, PA pressure elevated, d-dimer elevated, possibility of PE    > DC plavix to decrease incidence of bleed, last stent placed >1 year ago  - Sedation Vacation daily     - opened eyes to her name, failed neuro assessment   - EEG- generalized slowing   - Discontinued Propofol due to acidosis, c/w fentanyl and Precedex   - Currently only on Levo, d/c vaso  - f/u LFTs  - Possible Trach placement tmrw       - CT surgery notified      - family is agreeable   - Potassium replete today, f/u afternoon BMP     #Anemia   - Hb 7.1 today, has been trending down   - Giving 1 unit PRBC  - heparin held   - f/u afternoon cbc     # Afib  - transient episode of atrial fibrillation on morning of 4/23 > self resolved.    # BRIDGETTE   - Not known CKD, creatinine normal on admission   - c/w  Lasix 40mg IV qd  - DC vancomycin on 4/13  - f/u nephro   - 250ml Free water through NGT q 4   - D/C D5W     # Ischemic heart disease  - On DAPT on admission - Held aspirin after starting therapeutic anticoagulation on 4/17  - Metoprolol 50mg bid discontinued on 4/17 for hemodynamic stability  - On lasix 40mg QD   - Echo done showed LVEF 66%, mod RV dysfunction, PASP 58mmHg.     # DVT prophylaxis: heparin drip (currently held)

## 2020-04-30 NOTE — PROGRESS NOTE ADULT - SUBJECTIVE AND OBJECTIVE BOX
Patient is a 70y old  Female who presents with a chief complaint of shortness of breath (2020 13:29)      Over Night Events:  Patient seen and examined.   still vented lethargic while off sedation     ROS:  See HPI    PHYSICAL EXAM    ICU Vital Signs Last 24 Hrs  T(C): 37.1 (2020 05:00), Max: 38.1 (2020 19:04)  T(F): 98.7 (2020 05:00), Max: 100.5 (2020 19:04)  HR: 99 (2020 06:56) (83 - 115)  BP: --  BP(mean): --  ABP: 115/42 (2020 06:56) (99/43 - 137/52)  ABP(mean): 63 (2020 06:56) (58 - 82)  RR: 25 (2020 06:56) (25 - 28)  SpO2: 96% (2020 06:56) (93% - 100%)      General: on sedation open eyes when off sedation   HEENT:        et tube         Lymph Nodes: NO cervical LN   Lungs: Bilateral BS  Cardiovascular: Regular   Abdomen: Soft, Positive BS  Extremities: No clubbing   Skin: warm   Neurological: on sedation   Musculoskeletal: move all ext     I&O's Detail    2020 07:01  -  2020 07:00  --------------------------------------------------------  IN:    dexmedetomidine Infusion: 572.3 mL    dextrose 5%.: 1540 mL    Enteral Tube Flush: 1000 mL    fentaNYL  Infusion: 308.2 mL    Free Water: 598 mL    heparin Infusion: 160 mL    heparin Infusion: 15 mL    norepinephrine Infusion: 760.4 mL    ns in tub fed Peptamen Intense VHP: 1000 mL    propofol Infusion: 35.4 mL    vasopressin Infusion: 1.2 mL  Total IN: 5990.5 mL    OUT:    Indwelling Catheter - Urethral: 2375 mL  Total OUT: 2375 mL    Total NET: 3615.5 mL          LABS:                          7.1    5.58  )-----------( 173      ( 2020 05:00 )             22.8         2020 05:00    138    |  107    |  67     ----------------------------<  154    3.4     |  19     |  0.7      Ca    8.3        2020 05:00  Mg     1.9       2020 05:10    TPro  5.0    /  Alb  2.6    /  TBili  0.6    /  DBili  0.5    /  AST  13     /  ALT  8      /  AlkPhos  49     2020 05:00  Amylase x     lipase x                                                 PT/INR - ( 2020 05:00 )   PT: 12.50 sec;   INR: 1.09 ratio         PTT - ( 2020 05:00 )  PTT:27.5 sec                                       Urinalysis Basic - ( 2020 05:00 )    Color: Yellow / Appearance: Clear / S.020 / pH: x  Gluc: x / Ketone: Negative  / Bili: Negative / Urobili: <2 mg/dL   Blood: x / Protein: Trace / Nitrite: Positive   Leuk Esterase: Negative / RBC: 2 /HPF / WBC 3 /HPF   Sq Epi: x / Non Sq Epi: 1 /HPF / Bacteria: Many          CARDIAC MARKERS ( 2020 05:00 )  x     / x     / 21 U/L / x     / x      CARDIAC MARKERS ( 2020 16:30 )  x     / x     / 25 U/L / x     / x      CARDIAC MARKERS ( 2020 08:35 )  x     / x     / 18 U/L / x     / x                                                                                                         Mode: AC/ CMV (Assist Control/ Continuous Mandatory Ventilation)  RR (machine): 25  FiO2: 60  PEEP: 10  PC: 28  PIP: 28                                      ABG - ( 2020 02:38 )  pH, Arterial: 7.42  pH, Blood: x     /  pCO2: 33    /  pO2: 105   / HCO3: 22    / Base Excess: -2.4  /  SaO2: 99                  MEDICATIONS  (STANDING):  artificial tears (preservative free) Ophthalmic Solution 1 Drop(s) Both EYES two times a day  aspirin  chewable 81 milliGRAM(s) Oral daily  chlorhexidine 0.12% Liquid 15 milliLiter(s) Oral Mucosa two times a day  chlorhexidine 4% Liquid 1 Application(s) Topical <User Schedule>  dexMEDEtomidine Infusion 0.2 MICROgram(s)/kG/Hr (5.9 mL/Hr) IV Continuous <Continuous>  dextrose 5%. 1000 milliLiter(s) (50 mL/Hr) IV Continuous <Continuous>  dextrose 5%. 1000 milliLiter(s) (70 mL/Hr) IV Continuous <Continuous>  dextrose 50% Injectable 12.5 Gram(s) IV Push once  dextrose 50% Injectable 25 Gram(s) IV Push once  dextrose 50% Injectable 25 Gram(s) IV Push once  fentaNYL   Infusion 0.5 MICROgram(s)/kG/Hr (5.9 mL/Hr) IV Continuous <Continuous>  folic acid 1 milliGRAM(s) Oral daily  furosemide   Injectable 40 milliGRAM(s) IV Push daily  insulin glargine Injectable (LANTUS) 18 Unit(s) SubCutaneous at bedtime  insulin lispro (HumaLOG) corrective regimen sliding scale   SubCutaneous three times a day before meals  insulin lispro Injectable (HumaLOG) 6 Unit(s) SubCutaneous every 6 hours  investigational medication - IVPB 100 milliGRAM(s) IV Intermittent <User Schedule>  norepinephrine Infusion 0.05 MICROgram(s)/kG/Min (11.1 mL/Hr) IV Continuous <Continuous>  pantoprazole   Suspension 40 milliGRAM(s) Oral daily  polyethylene glycol 3350 17 Gram(s) Oral two times a day  senna 2 Tablet(s) Oral at bedtime    MEDICATIONS  (PRN):  acetaminophen   Tablet .. 650 milliGRAM(s) Oral every 6 hours PRN Temp greater or equal to 38C (100.4F)  dextrose 40% Gel 15 Gram(s) Oral once PRN Blood Glucose LESS THAN 70 milliGRAM(s)/deciliter  glucagon  Injectable 1 milliGRAM(s) IntraMuscular once PRN Glucose LESS THAN 70 milligrams/deciliter          Xrays:  TLC:  OG:  ET tube:                                                                                    b/l opacity    ECHO:  [facility] ICU:

## 2020-04-30 NOTE — CONSULT NOTE ADULT - ASSESSMENT
70F admitted with COVID, respiratory distress intubated for 2 weeks, surgery consulted for Tracheostomy.     -Primary Team needs to have a documented conversation with health care proxy acknowledging that the family is aware of planned tracheostomy and are agreeable to the procedure before plans for tracheostomy can begin. Please obtain the most up to date information on how to  for consent.  -Active T+S 70F admitted with COVID, respiratory distress intubated for 2 weeks, surgery consulted for Tracheostomy/Peg     -Primary Team needs to have a documented conversation with health care proxy acknowledging that the family is aware of planned tracheostomy/peg tube placement and are agreeable to the procedure before plans for tracheostomy can begin. Please obtain the most up to date information on how to  for consent.  -Active T+S

## 2020-04-30 NOTE — PROGRESS NOTE ADULT - SUBJECTIVE AND OBJECTIVE BOX
JEREMIAH, IRENE  70y, Female    All available historical data reviewed    OVERNIGHT EVENTS:  low grade fevers  fio2 60%    ROS:  unable to obtain history secondary to patient's mental status and/or sedation   VITALS:  T(F): 99.9, Max: 100.5 (20 @ 19:04)  HR: 96  BP: --  RR: 25Vital Signs Last 24 Hrs  T(C): 37.7 (2020 08:17), Max: 38.1 (2020 19:04)  T(F): 99.9 (2020 08:17), Max: 100.5 (2020 19:04)  HR: 96 (2020 08:17) (83 - 109)  BP: --  BP(mean): --  RR: 25 (2020 08:17) (25 - 28)  SpO2: 94% (:17) (94% - 100%)    TESTS & MEASUREMENTS:                        7.1    5.58  )-----------( 173      ( 2020 05:00 )             22.8         138  |  107  |  67<HH>  ----------------------------<  154<H>  3.4<L>   |  19  |  0.7    Ca    8.3<L>      2020 05:00  Mg     1.9         TPro  5.0<L>  /  Alb  2.6<L>  /  TBili  0.6  /  DBili  0.5<H>  /  AST  13  /  ALT  8   /  AlkPhos  49      LIVER FUNCTIONS - ( 2020 05:00 )  Alb: 2.6 g/dL / Pro: 5.0 g/dL / ALK PHOS: 49 U/L / ALT: 8 U/L / AST: 13 U/L / GGT: x             Urinalysis Basic - ( 2020 05:00 )    Color: Yellow / Appearance: Clear / S.020 / pH: x  Gluc: x / Ketone: Negative  / Bili: Negative / Urobili: <2 mg/dL   Blood: x / Protein: Trace / Nitrite: Positive   Leuk Esterase: Negative / RBC: 2 /HPF / WBC 3 /HPF   Sq Epi: x / Non Sq Epi: 1 /HPF / Bacteria: Many          RADIOLOGY & ADDITIONAL TESTS:  Personal review of radiological diagnostics performed  Echo and EKG results noted when applicable.     MEDICATIONS:  acetaminophen   Tablet .. 650 milliGRAM(s) Oral every 6 hours PRN  artificial tears (preservative free) Ophthalmic Solution 1 Drop(s) Both EYES two times a day  aspirin  chewable 81 milliGRAM(s) Oral daily  chlorhexidine 0.12% Liquid 15 milliLiter(s) Oral Mucosa two times a day  chlorhexidine 4% Liquid 1 Application(s) Topical <User Schedule>  dexMEDEtomidine Infusion 0.2 MICROgram(s)/kG/Hr IV Continuous <Continuous>  dextrose 40% Gel 15 Gram(s) Oral once PRN  dextrose 50% Injectable 12.5 Gram(s) IV Push once  dextrose 50% Injectable 25 Gram(s) IV Push once  dextrose 50% Injectable 25 Gram(s) IV Push once  fentaNYL   Infusion 0.5 MICROgram(s)/kG/Hr IV Continuous <Continuous>  folic acid 1 milliGRAM(s) Oral daily  furosemide   Injectable 40 milliGRAM(s) IV Push daily  glucagon  Injectable 1 milliGRAM(s) IntraMuscular once PRN  insulin glargine Injectable (LANTUS) 18 Unit(s) SubCutaneous at bedtime  insulin lispro (HumaLOG) corrective regimen sliding scale   SubCutaneous three times a day before meals  insulin lispro Injectable (HumaLOG) 6 Unit(s) SubCutaneous every 6 hours  investigational medication - IVPB 100 milliGRAM(s) IV Intermittent <User Schedule>  norepinephrine Infusion 0.05 MICROgram(s)/kG/Min IV Continuous <Continuous>  pantoprazole   Suspension 40 milliGRAM(s) Oral daily  polyethylene glycol 3350 17 Gram(s) Oral two times a day  senna 2 Tablet(s) Oral at bedtime      ANTIBIOTICS:

## 2020-04-30 NOTE — CONSULT NOTE ADULT - SUBJECTIVE AND OBJECTIVE BOX
NIRMAL DANIELS [id number]  70y Female    HPI:  70y F w/ PMH of asthma (never hospitalized, never intubated), CAD s/p stent (2018), HTN, HLD, and DM (diet-controlled) presents from [facility] w/ the chief complaint of shortness of breath, fevers, chills for 1 week and has been progressively worsening. Patient denies ever having these symptoms before. Today the symptoms got much worse and was associated with wheezing as well so she came in to ED via EMS to be evaluated. No alleviating/worsening symptoms. Denies headache, CP, abd pain, n/v/d, or numbness/tingling.    According to nursing home records, patients has had intermittent fevers since 2020 and has been getting IV antibiotics, azithromycin and merrem.    In ED- , RR 25, T-98F, patient was hypoxic to 70's on RA and was placed on NRB 15 LPM called for MICU    PMD- Dr. Levy  Cardio- Dr. Benjamin (2020 14:01)    Surgery consulted for Tracheostomy after being intubated for 2 weeks.       PAST MEDICAL & SURGICAL HISTORY:  Depression  Anxiety  Dyslipidemia  Diabetes mellitus  Hypertension        MEDICATIONS  (STANDING):  artificial tears (preservative free) Ophthalmic Solution 1 Drop(s) Both EYES two times a day  aspirin  chewable 81 milliGRAM(s) Oral daily  chlorhexidine 0.12% Liquid 15 milliLiter(s) Oral Mucosa two times a day  chlorhexidine 4% Liquid 1 Application(s) Topical <User Schedule>  dexMEDEtomidine Infusion 0.2 MICROgram(s)/kG/Hr (5.9 mL/Hr) IV Continuous <Continuous>  dextrose 50% Injectable 12.5 Gram(s) IV Push once  dextrose 50% Injectable 25 Gram(s) IV Push once  dextrose 50% Injectable 25 Gram(s) IV Push once  fentaNYL   Infusion 0.5 MICROgram(s)/kG/Hr (5.9 mL/Hr) IV Continuous <Continuous>  folic acid 1 milliGRAM(s) Oral daily  furosemide   Injectable 40 milliGRAM(s) IV Push daily  insulin glargine Injectable (LANTUS) 18 Unit(s) SubCutaneous at bedtime  insulin lispro (HumaLOG) corrective regimen sliding scale   SubCutaneous three times a day before meals  insulin lispro Injectable (HumaLOG) 6 Unit(s) SubCutaneous every 6 hours  investigational medication - IVPB 100 milliGRAM(s) IV Intermittent <User Schedule>  norepinephrine Infusion 0.05 MICROgram(s)/kG/Min (11.1 mL/Hr) IV Continuous <Continuous>  pantoprazole   Suspension 40 milliGRAM(s) Oral daily  polyethylene glycol 3350 17 Gram(s) Oral two times a day  senna 2 Tablet(s) Oral at bedtime    MEDICATIONS  (PRN):  acetaminophen   Tablet .. 650 milliGRAM(s) Oral every 6 hours PRN Temp greater or equal to 38C (100.4F)  dextrose 40% Gel 15 Gram(s) Oral once PRN Blood Glucose LESS THAN 70 milliGRAM(s)/deciliter  glucagon  Injectable 1 milliGRAM(s) IntraMuscular once PRN Glucose LESS THAN 70 milligrams/deciliter      Allergies    No Known Allergies    Intolerances        REVIEW OF SYSTEMS    [ ] A ten-point review of systems was otherwise negative except as noted.  [ ] Due to altered mental status/intubation, subjective information were not able to be obtained from the patient. History was obtained, to the extent possible, from review of the chart and collateral sources of information.      Vital Signs Last 24 Hrs  T(C): 37.7 (2020 08:17), Max: 38.1 (2020 19:04)  T(F): 99.9 (2020 08:17), Max: 100.5 (2020 19:04)  HR: 96 (2020 08:17) (83 - 109)  BP: --  BP(mean): --  RR: 25 (2020 08:17) (25 - 28)  SpO2: 94% (2020 08:17) (94% - 100%)    PHYSICAL EXAM:  GENERAL: Intubated, sedated  CHEST/LUNG: Clear to auscultation bilaterally  HEART: Regular rate and rhythm  ABDOMEN: Soft, Nontender, Nondistended;   EXTREMITIES:  No clubbing, cyanosis, or edema      LABS:  Labs:  CAPILLARY BLOOD GLUCOSE      POCT Blood Glucose.: 143 mg/dL (2020 10:04)  POCT Blood Glucose.: 159 mg/dL (2020 06:01)  POCT Blood Glucose.: 137 mg/dL (2020 04:03)  POCT Blood Glucose.: 166 mg/dL (2020 00:07)  POCT Blood Glucose.: 150 mg/dL (2020 20:13)  POCT Blood Glucose.: 140 mg/dL (2020 16:05)  POCT Blood Glucose.: 170 mg/dL (2020 11:16)                          7.1    5.58  )-----------( 173      ( 2020 05:00 )             22.8       Auto Neutrophil %: 64.1 % (20 @ 17:40)  Auto Immature Granulocyte %: 1.4 % (20 @ 17:40)        138  |  107  |  67<HH>  ----------------------------<  154<H>  3.4<L>   |  19  |  0.7      Calcium, Total Serum: 8.3 mg/dL (20 @ 05:00)      LFTs:             5.0  | 0.6  | 13       ------------------[49      ( 2020 05:00 )  2.6  | 0.5  | 8           Lipase:x      Amylase:x         Blood Gas Arterial, Lactate: 1.1 mmoL/L (20 @ 02:38)  Blood Gas Arterial, Lactate: 0.9 mmoL/L (20 @ 05:56)  Blood Gas Arterial, Lactate: 0.7 mmoL/L (20 @ 22:37)  Blood Gas Arterial, Lactate: 0.5 mmoL/L (20 @ 10:32)  Blood Gas Arterial, Lactate: 0.6 mmoL/L (20 @ 03:12)    ABG - ( 2020 02:38 )  pH: 7.42  /  pCO2: 33    /  pO2: 105   / HCO3: 22    / Base Excess: -2.4  /  SaO2: 99              ABG - ( 2020 05:56 )  pH: 7.48  /  pCO2: 24    /  pO2: 143   / HCO3: 18    / Base Excess: -4.7  /  SaO2: 100             ABG - ( 2020 22:37 )  pH: 7.20  /  pCO2: 59    /  pO2: 80    / HCO3: 23    / Base Excess: -4.7  /  SaO2: 94                Coags:     12.50  ----< 1.09    ( 2020 05:00 )     27.5        CARDIAC MARKERS ( 2020 05:00 )  x     / x     / 21 U/L / x     / x      CARDIAC MARKERS ( 2020 16:30 )  x     / x     / 25 U/L / x     / x      CARDIAC MARKERS ( 2020 08:35 )  x     / x     / 18 U/L / x     / x              Urinalysis Basic - ( 2020 05:00 )    Color: Yellow / Appearance: Clear / S.020 / pH: x  Gluc: x / Ketone: Negative  / Bili: Negative / Urobili: <2 mg/dL   Blood: x / Protein: Trace / Nitrite: Positive   Leuk Esterase: Negative / RBC: 2 /HPF / WBC 3 /HPF   Sq Epi: x / Non Sq Epi: 1 /HPF / Bacteria: Many NIRMAL DANIELS [id number]  70y Female    HPI:  70y F w/ PMH of asthma (never hospitalized, never intubated), CAD s/p stent (2018), HTN, HLD, and DM (diet-controlled) presents from [facility] w/ the chief complaint of shortness of breath, fevers, chills for 1 week and has been progressively worsening. Patient denies ever having these symptoms before. Today the symptoms got much worse and was associated with wheezing as well so she came in to ED via EMS to be evaluated. No alleviating/worsening symptoms. Denies headache, CP, abd pain, n/v/d, or numbness/tingling.    According to nursing home records, patients has had intermittent fevers since 2020 and has been getting IV antibiotics, azithromycin and merrem.    In ED- , RR 25, T-98F, patient was hypoxic to 70's on RA and was placed on NRB 15 LPM called for MICU    PMD- Dr. Levy  Cardio- Dr. Benjamin (2020 14:01)    Surgery consulted for Tracheostomy and PEG tube placement after being intubated for 2 weeks.       PAST MEDICAL & SURGICAL HISTORY:  Depression  Anxiety  Dyslipidemia  Diabetes mellitus  Hypertension        MEDICATIONS  (STANDING):  artificial tears (preservative free) Ophthalmic Solution 1 Drop(s) Both EYES two times a day  aspirin  chewable 81 milliGRAM(s) Oral daily  chlorhexidine 0.12% Liquid 15 milliLiter(s) Oral Mucosa two times a day  chlorhexidine 4% Liquid 1 Application(s) Topical <User Schedule>  dexMEDEtomidine Infusion 0.2 MICROgram(s)/kG/Hr (5.9 mL/Hr) IV Continuous <Continuous>  dextrose 50% Injectable 12.5 Gram(s) IV Push once  dextrose 50% Injectable 25 Gram(s) IV Push once  dextrose 50% Injectable 25 Gram(s) IV Push once  fentaNYL   Infusion 0.5 MICROgram(s)/kG/Hr (5.9 mL/Hr) IV Continuous <Continuous>  folic acid 1 milliGRAM(s) Oral daily  furosemide   Injectable 40 milliGRAM(s) IV Push daily  insulin glargine Injectable (LANTUS) 18 Unit(s) SubCutaneous at bedtime  insulin lispro (HumaLOG) corrective regimen sliding scale   SubCutaneous three times a day before meals  insulin lispro Injectable (HumaLOG) 6 Unit(s) SubCutaneous every 6 hours  investigational medication - IVPB 100 milliGRAM(s) IV Intermittent <User Schedule>  norepinephrine Infusion 0.05 MICROgram(s)/kG/Min (11.1 mL/Hr) IV Continuous <Continuous>  pantoprazole   Suspension 40 milliGRAM(s) Oral daily  polyethylene glycol 3350 17 Gram(s) Oral two times a day  senna 2 Tablet(s) Oral at bedtime    MEDICATIONS  (PRN):  acetaminophen   Tablet .. 650 milliGRAM(s) Oral every 6 hours PRN Temp greater or equal to 38C (100.4F)  dextrose 40% Gel 15 Gram(s) Oral once PRN Blood Glucose LESS THAN 70 milliGRAM(s)/deciliter  glucagon  Injectable 1 milliGRAM(s) IntraMuscular once PRN Glucose LESS THAN 70 milligrams/deciliter      Allergies    No Known Allergies    Intolerances        REVIEW OF SYSTEMS    [ ] A ten-point review of systems was otherwise negative except as noted.  [ ] Due to altered mental status/intubation, subjective information were not able to be obtained from the patient. History was obtained, to the extent possible, from review of the chart and collateral sources of information.      Vital Signs Last 24 Hrs  T(C): 37.7 (2020 08:17), Max: 38.1 (2020 19:04)  T(F): 99.9 (2020 08:17), Max: 100.5 (2020 19:04)  HR: 96 (2020 08:17) (83 - 109)  BP: --  BP(mean): --  RR: 25 (2020 08:17) (25 - 28)  SpO2: 94% (2020 08:17) (94% - 100%)    PHYSICAL EXAM:  GENERAL: Intubated, sedated  CHEST/LUNG: Clear to auscultation bilaterally  HEART: Regular rate and rhythm  ABDOMEN: Soft, Nontender, Nondistended;   EXTREMITIES:  No clubbing, cyanosis, or edema      LABS:  Labs:  CAPILLARY BLOOD GLUCOSE      POCT Blood Glucose.: 143 mg/dL (2020 10:04)  POCT Blood Glucose.: 159 mg/dL (2020 06:01)  POCT Blood Glucose.: 137 mg/dL (2020 04:03)  POCT Blood Glucose.: 166 mg/dL (2020 00:07)  POCT Blood Glucose.: 150 mg/dL (2020 20:13)  POCT Blood Glucose.: 140 mg/dL (2020 16:05)  POCT Blood Glucose.: 170 mg/dL (2020 11:16)                          7.1    5.58  )-----------( 173      ( 2020 05:00 )             22.8       Auto Neutrophil %: 64.1 % (20 @ 17:40)  Auto Immature Granulocyte %: 1.4 % (20 @ 17:40)        138  |  107  |  67<HH>  ----------------------------<  154<H>  3.4<L>   |  19  |  0.7      Calcium, Total Serum: 8.3 mg/dL (20 @ 05:00)      LFTs:             5.0  | 0.6  | 13       ------------------[49      ( 2020 05:00 )  2.6  | 0.5  | 8           Lipase:x      Amylase:x         Blood Gas Arterial, Lactate: 1.1 mmoL/L (20 @ 02:38)  Blood Gas Arterial, Lactate: 0.9 mmoL/L (20 @ 05:56)  Blood Gas Arterial, Lactate: 0.7 mmoL/L (20 @ 22:37)  Blood Gas Arterial, Lactate: 0.5 mmoL/L (20 @ 10:32)  Blood Gas Arterial, Lactate: 0.6 mmoL/L (20 @ 03:12)    ABG - ( 2020 02:38 )  pH: 7.42  /  pCO2: 33    /  pO2: 105   / HCO3: 22    / Base Excess: -2.4  /  SaO2: 99              ABG - ( 2020 05:56 )  pH: 7.48  /  pCO2: 24    /  pO2: 143   / HCO3: 18    / Base Excess: -4.7  /  SaO2: 100             ABG - ( 2020 22:37 )  pH: 7.20  /  pCO2: 59    /  pO2: 80    / HCO3: 23    / Base Excess: -4.7  /  SaO2: 94                Coags:     12.50  ----< 1.09    ( 2020 05:00 )     27.5        CARDIAC MARKERS ( 2020 05:00 )  x     / x     / 21 U/L / x     / x      CARDIAC MARKERS ( 2020 16:30 )  x     / x     / 25 U/L / x     / x      CARDIAC MARKERS ( 2020 08:35 )  x     / x     / 18 U/L / x     / x              Urinalysis Basic - ( 2020 05:00 )    Color: Yellow / Appearance: Clear / S.020 / pH: x  Gluc: x / Ketone: Negative  / Bili: Negative / Urobili: <2 mg/dL   Blood: x / Protein: Trace / Nitrite: Positive   Leuk Esterase: Negative / RBC: 2 /HPF / WBC 3 /HPF   Sq Epi: x / Non Sq Epi: 1 /HPF / Bacteria: Many

## 2020-04-30 NOTE — CONSULT NOTE ADULT - ATTENDING COMMENTS
agree with above
General Thoracic Surgery Attestation    I have seen and examined the patient.  Where appropriate I have updated, edited, or corrected the resident's or PA's note with regard to findings, values, and plan.    reasonable candidate for trach/peg.  extensive time (>1hr) spent discussing with son Adolfo (daughter did not  phone on multiple attempts).  all questions asked and answered.  plan for trach/PEG tomorrow.

## 2020-04-30 NOTE — PROGRESS NOTE ADULT - ASSESSMENT
· Assessment		  70y F w/ PMH of asthma (never hospitalized, never intubated), CAD s/p stent (07/2018), HTN, HLD, and DM (diet-controlled) presents from [facility] w/ the chief complaint of shortness of breath, fevers, chills for 1 week and has been progressively worsening.    IMPRESSION;   COVID19 with resolved septic shock , resp failure, mechanical ventilation with ARDS with FiO2 100 %, secondary to Cytokine Release Syndrome  -inflammatory markers elevated  -elevated Ddimer is a poor prognostic indicator and differentiate survivors from non survivors  -procalcitonin of 0.65 > 0.47 suggestive of a bacterial PNA/infection   -was on azithro/ merem in [facility]   -BCx NG  s/p Anakinra    RECOMMENDATIONS;  Remdesivir since 4/23. 10 days in all. Little response  Daily CBC BMP PT PTT UA  S/p PLAQUENIL   s/p Anakinra  4/15-18  poor prognosis  anticoagulation  NO NEED TO REPEAT INFLAMMATORY MARKERS

## 2020-05-01 NOTE — PROGRESS NOTE ADULT - SUBJECTIVE AND OBJECTIVE BOX
JEREMIAH, IRENE  70y, Female    All available historical data reviewed    OVERNIGHT EVENTS:  low grade fevers  fio2 50%    ROS:  unable to obtain history secondary to patient's mental status and/or sedation     VITALS:  T(F): 98.4, Max: 100 (20 @ 07:30)  HR: 85  BP: 102/43  RR: 27Vital Signs Last 24 Hrs  T(C): 36.9 (01 May 2020 10:40), Max: 37.8 (01 May 2020 07:30)  T(F): 98.4 (01 May 2020 10:40), Max: 100 (01 May 2020 07:30)  HR: 85 (01 May 2020 11:47) (80 - 103)  BP: 102/43 (01 May 2020 11:45) (92/52 - 169/72)  BP(mean): 61 (01 May 2020 11:45) (61 - 87)  RR: 27 (01 May 2020 11:30) (25 - 27)  SpO2: 100% (01 May 2020 11:47) (96% - 100%)    TESTS & MEASUREMENTS:                        8.3    7.45  )-----------( 173      ( 01 May 2020 04:30 )             25.0     05-    137  |  106  |  57<H>  ----------------------------<  148<H>  3.8   |  19  |  0.7    Ca    7.9<L>      01 May 2020 04:30  Mg     1.6     05-    TPro  4.9<L>  /  Alb  2.6<L>  /  TBili  0.9  /  DBili  x   /  AST  12  /  ALT  9   /  AlkPhos  47  05-01    LIVER FUNCTIONS - ( 01 May 2020 04:30 )  Alb: 2.6 g/dL / Pro: 4.9 g/dL / ALK PHOS: 47 U/L / ALT: 9 U/L / AST: 12 U/L / GGT: x             Urinalysis Basic - ( 2020 05:00 )    Color: Yellow / Appearance: Clear / S.020 / pH: x  Gluc: x / Ketone: Negative  / Bili: Negative / Urobili: <2 mg/dL   Blood: x / Protein: Trace / Nitrite: Positive   Leuk Esterase: Negative / RBC: 2 /HPF / WBC 3 /HPF   Sq Epi: x / Non Sq Epi: 1 /HPF / Bacteria: Many          RADIOLOGY & ADDITIONAL TESTS:  Personal review of radiological diagnostics performed  Echo and EKG results noted when applicable.     MEDICATIONS:  acetaminophen    Suspension .. 650 milliGRAM(s) Enteral Tube every 6 hours PRN  artificial tears (preservative free) Ophthalmic Solution 1 Drop(s) Both EYES two times a day  aspirin  chewable 81 milliGRAM(s) Oral daily  chlorhexidine 0.12% Liquid 15 milliLiter(s) Oral Mucosa two times a day  chlorhexidine 4% Liquid 1 Application(s) Topical <User Schedule>  dexMEDEtomidine Infusion 0.2 MICROgram(s)/kG/Hr IV Continuous <Continuous>  dexMEDEtomidine Infusion 1 MICROgram(s)/kG/Hr IV Continuous <Continuous>  fentaNYL   Infusion 0.3 MICROgram(s)/kG/Hr IV Continuous <Continuous>  fentaNYL   Infusion 0.5 MICROgram(s)/kG/Hr IV Continuous <Continuous>  folic acid 1 milliGRAM(s) Oral daily  furosemide   Injectable 40 milliGRAM(s) IV Push daily  heparin   Injectable 5000 Unit(s) SubCutaneous every 8 hours  insulin glargine Injectable (LANTUS) 18 Unit(s) SubCutaneous at bedtime  insulin lispro (HumaLOG) corrective regimen sliding scale   SubCutaneous three times a day before meals  insulin lispro Injectable (HumaLOG) 6 Unit(s) SubCutaneous every 6 hours  investigational medication - IVPB   IV Intermittent <User Schedule>  lactated ringers. 1000 milliLiter(s) IV Continuous <Continuous>  norepinephrine Infusion 0.05 MICROgram(s)/kG/Min IV Continuous <Continuous>  pantoprazole   Suspension 40 milliGRAM(s) Oral daily  polyethylene glycol 3350 17 Gram(s) Oral two times a day  senna 2 Tablet(s) Oral at bedtime      ANTIBIOTICS:

## 2020-05-01 NOTE — PROGRESS NOTE ADULT - ASSESSMENT
70y F w/ PMH of asthma (never hospitalized, never intubated), CAD s/p stent (07/2018), HTN, HLD, and DM (diet-controlled) presents from [facility] w/ the chief complaint of shortness of breath, fevers, chills for 1 week and has been progressively worsening.      # Hypoxic respiratory failure  - Intubated       - FiO2 50%, pressure 28/10  - Chest X-ray: stable b/l opacities   - Etiology may include:     > Given pandemic > COVID - 19       + COVID-19 PCR +ve      + Completed HCQ      + Kineret on 4/16      + Solumedrol 60mg IV q12h > dropped to 40qd on 4/16 and DCed on 4/24      + Started on Remdecivir on 4/23 for 10 days  - ID recs appreciated    - suspected GNR pneumonia viral pneumonia secondary     - s/p angeli vanco 	    - no need to repeat inflammatory markers   - RV dilated, PA pressure elevated, d-dimer elevated, possibility of PE    > DC plavix to decrease incidence of bleed, last stent placed >1 year ago  - Sedation Vacation daily     - opened eyes to her name, failed neuro assessment   - CT Head 4/20: no evidence of acute pathology   - EEG- generalized slowing   - Discontinued Propofol due to acidosis, c/w fentanyl and Precedex   - Currently only on Levo, d/c vaso  - Trach and PEG today     #Anemia   - Hb 8.3 today, s/p one unit yesterday, responded appropriately   - CT abdomen: no evidence of retroperitoneal hematoma   - heparin held  - if CBC stable start heparin tmrw     # Afib  - transient episode of atrial fibrillation on morning of 4/23 > self resolved.    # BRIDGETTE   - Not known CKD, creatinine normal on admission   - c/w  Lasix 40mg IV qd  - DC vancomycin on 4/13  - f/u nephro   - 250ml Free water through NGT q 4   - D/C D5W     # Ischemic heart disease  - On DAPT on admission - Held aspirin after starting therapeutic anticoagulation on 4/17  - Metoprolol 50mg bid discontinued on 4/17 for hemodynamic stability  - On lasix 40mg QD   - Echo done showed LVEF 66%, mod RV dysfunction, PASP 58mmHg.     # DVT prophylaxis: heparin drip (currently held)

## 2020-05-01 NOTE — PROGRESS NOTE ADULT - ASSESSMENT
70y F w/ PMH of asthma (never hospitalized, never intubated), CAD s/p stent (07/2018), HTN, HLD, and DM (diet-controlled) presents from [facility] w/ the chief complaint of shortness of breath, fevers, chills for 1 week and has been progressively worsening.    Impression   ARF hypoxic   -r/o COVID  -Suspected GNR PNA  viral PNA secondary   -Asthma Exacerbation  ARDS  BRIDGETTE    Plan     1.CNS   on sedation     now off fentnayl and propofol open eyes   2. CVS     echo 66% moderate LVH , puln htn ?? rv dilatation        3. PULMONARY    going for tracheostomy        4. INFECTIOUS DISEASE     on HC  off angeli   follow ID    s/p [id number]   on remdisiver study   follow up Id recommendation     5. GI  -GI ppx, protonix  check LFT   free water 250cc Q 4  hrs     6. RENAL  -replete electrolytes PRN  follow renal   on lasix Q 24 hrs    monitor Is and OS       7. Endocrine   -f/u FS, target FS <180    8. Hematology  transfuse 1 unit of prbc   do ct aBD R/O RETRO BL;EED   follow h/h  is stable start heaprin Sq will revaluate terrie   11. Code Status   -full code

## 2020-05-01 NOTE — PROGRESS NOTE ADULT - SUBJECTIVE AND OBJECTIVE BOX
NIRMAL DANIELS  70y Female   [id number]    Procedure/dx: needs trach and PEG  Events of the Last 24h:  surgery consulted for Tracheostomy/Peg     Patient is a 70y old  Female who presents with a chief complaint of shortness of breath (2020 13:30)    PAST MEDICAL & SURGICAL HISTORY:  Depression  Anxiety  Dyslipidemia  Diabetes mellitus  Hypertension    Vital Signs Last 24 Hrs  T(C): 37 (2020 21:17), Max: 37.7 (2020 08:17)  T(F): 98.6 (2020 21:17), Max: 99.9 (2020 08:17)  HR: 84 (2020 20:32) (83 - 103)  BP: 169/72 (2020 21:17) (112/59 - 169/72)  BP(mean): 87 (2020 20:32) (81 - 87)  RR: 25 (2020 21:17) (25 - 25)  SpO2: 100% (2020 21:17) (94% - 100%)    Diet, NPO with Tube Feed:   Tube Feeding Modality: Orogastric  Peptamen Intense VHP  Total Volume for 24 Hours (mL): 1200  Continuous  Until Goal Tube Feed Rate (mL per Hour): 50  Tube Feed Duration (in Hours): 24  Tube Feed Start Time: 15:00 (20 @ 14:38)  Diet, NPO after Midnight:      NPO Start Date: 2020,   NPO Start Time: 23:59 (20 @ 11:43)      I&O's Detail    2020 07:01  -  2020 07:00  --------------------------------------------------------  IN:    dexmedetomidine Infusion: 572.3 mL    dextrose 5%.: 1540 mL    Enteral Tube Flush: 1000 mL    fentaNYL  Infusion: 308.2 mL    Free Water: 598 mL    heparin Infusion: 160 mL    heparin Infusion: 15 mL    norepinephrine Infusion: 760.4 mL    ns in tub fed Peptamen Intense VHP: 1000 mL    propofol Infusion: 35.4 mL    vasopressin Infusion: 1.2 mL  Total IN: 5990.5 mL    OUT:    Indwelling Catheter - Urethral: 2375 mL  Total OUT: 2375 mL    Total NET: 3615.5 mL      2020 07:01  -  01 May 2020 01:01  --------------------------------------------------------  IN:    dexmedetomidine Infusion: 29.5 mL    Enteral Tube Flush: 500 mL    fentaNYL  Infusion: 17.7 mL    Free Water: 250 mL    norepinephrine Infusion: 33.2 mL    ns in tub fed Peptamen Intense VHP: 400 mL  Total IN: 1230.4 mL    OUT:    Indwelling Catheter - Urethral: 1370 mL  Total OUT: 1370 mL    Total NET: -139.6 mL      MEDICATIONS  (STANDING):  artificial tears (preservative free) Ophthalmic Solution 1 Drop(s) Both EYES two times a day  aspirin  chewable 81 milliGRAM(s) Oral daily  chlorhexidine 0.12% Liquid 15 milliLiter(s) Oral Mucosa two times a day  chlorhexidine 4% Liquid 1 Application(s) Topical <User Schedule>  dexMEDEtomidine Infusion 0.2 MICROgram(s)/kG/Hr (5.9 mL/Hr) IV Continuous <Continuous>  dextrose 50% Injectable 12.5 Gram(s) IV Push once  dextrose 50% Injectable 25 Gram(s) IV Push once  dextrose 50% Injectable 25 Gram(s) IV Push once  fentaNYL   Infusion 0.5 MICROgram(s)/kG/Hr (5.9 mL/Hr) IV Continuous <Continuous>  folic acid 1 milliGRAM(s) Oral daily  furosemide   Injectable 40 milliGRAM(s) IV Push daily  insulin glargine Injectable (LANTUS) 18 Unit(s) SubCutaneous at bedtime  insulin lispro (HumaLOG) corrective regimen sliding scale   SubCutaneous three times a day before meals  insulin lispro Injectable (HumaLOG) 6 Unit(s) SubCutaneous every 6 hours  investigational medication - IVPB 100 milliGRAM(s) IV Intermittent <User Schedule>  norepinephrine Infusion 0.05 MICROgram(s)/kG/Min (11.1 mL/Hr) IV Continuous <Continuous>  pantoprazole   Suspension 40 milliGRAM(s) Oral daily  polyethylene glycol 3350 17 Gram(s) Oral two times a day  senna 2 Tablet(s) Oral at bedtime    MEDICATIONS  (PRN):  acetaminophen   Tablet .. 650 milliGRAM(s) Oral every 6 hours PRN Temp greater or equal to 38C (100.4F)  dextrose 40% Gel 15 Gram(s) Oral once PRN Blood Glucose LESS THAN 70 milliGRAM(s)/deciliter  glucagon  Injectable 1 milliGRAM(s) IntraMuscular once PRN Glucose LESS THAN 70 milligrams/deciliter      PHYSICAL EXAM:  GENERAL: NAD, intubated  CHEST/LUNG: decreased breath sounds b/l  HEART: S1 and S2 noted  ABDOMEN: Soft, Nondistended;                         8.5    7.16  )-----------( 184      ( 01 May 2020 00:25 )             25.9        05-01    137  |  105  |  54<H>  ----------------------------<  152<H>  3.8   |  18  |  0.7    Ca    8.0<L>      01 May 2020 00:25  Mg     1.9     04-29    TPro  5.0<L>  /  Alb  2.6<L>  /  TBili  0.6  /  DBili  0.5<H>  /  AST  13  /  ALT  8   /  AlkPhos  49  04-30    LIVER FUNCTIONS - ( 2020 05:00 )  Alb: 2.6 g/dL / Pro: 5.0 g/dL / ALK PHOS: 49 U/L / ALT: 8 U/L / AST: 13 U/L / GGT: x           PT/INR - ( 2020 05:00 )   PT: 12.50 sec;   INR: 1.09 ratio         PTT - ( 2020 05:00 )  PTT:27.5 sec  CARDIAC MARKERS ( 2020 05:00 )  x     / x     / 21 U/L / x     / x      CARDIAC MARKERS ( 2020 16:30 )  x     / x     / 25 U/L / x     / x      CARDIAC MARKERS ( 2020 08:35 )  x     / x     / 18 U/L / x     / x          Urinalysis Basic - ( 2020 05:00 )    Color: Yellow / Appearance: Clear / S.020 / pH: x  Gluc: x / Ketone: Negative  / Bili: Negative / Urobili: <2 mg/dL   Blood: x / Protein: Trace / Nitrite: Positive   Leuk Esterase: Negative / RBC: 2 /HPF / WBC 3 /HPF   Sq Epi: x / Non Sq Epi: 1 /HPF / Bacteria: Many      IMAGING:  none

## 2020-05-01 NOTE — CHART NOTE - NSCHARTNOTEFT_GEN_A_CORE
Spoke with Adolfo Herrera at [phone]  today and updated him on patient's  status.  If no answer from above, may also call [phone] (wife cell) to reach Adolfo.

## 2020-05-01 NOTE — PROGRESS NOTE ADULT - ASSESSMENT
70F admitted with COVID, respiratory distress intubated for 2 weeks, surgery consulted for Tracheostomy/Peg     Plan:   tracheostomy and PEG today  keep NPO   follow up AM labs

## 2020-05-01 NOTE — PROGRESS NOTE ADULT - SUBJECTIVE AND OBJECTIVE BOX
Patient is a 70y old  Female who presents with a chief complaint of shortness of breath (01 May 2020 01:00)      Over Night Events:  Patient seen and examined.   s/p 1 unit of prbc   ct brain negative ct abd no bleed   still agitated while off     ROS:  See HPI    PHYSICAL EXAM    ICU Vital Signs Last 24 Hrs  T(C): 37 (2020 21:17), Max: 37.7 (2020 08:17)  T(F): 98.6 (2020 21:17), Max: 99.9 (2020 08:17)  HR: 92 (01 May 2020 07:17) (80 - 100)  BP: 169/72 (2020 21:17) (112/59 - 169/72)  BP(mean): 87 (2020 20:32) (81 - 87)  ABP: 114/60 (01 May 2020 07:00) (104/62 - 140/66)  ABP(mean): 82 (01 May 2020 07:00) (69 - 82)  RR: 25 (01 May 2020 07:17) (25 - 25)  SpO2: 100% (01 May 2020 07:17) (94% - 100%)      General: on sedation open eyes off sedation   HEENT:      et tube           Lymph Nodes: NO cervical LN   Lungs: Bilateral BS  Cardiovascular: Regular   Abdomen: Soft, Positive BS  Extremities: No clubbing   Skin: warm   Neurological: no focal   Musculoskeletal: move all ext     I&O's Detail    2020 07:01  -  01 May 2020 07:00  --------------------------------------------------------  IN:    dexmedetomidine Infusion: 29.5 mL    Enteral Tube Flush: 500 mL    fentaNYL  Infusion: 17.7 mL    Free Water: 250 mL    norepinephrine Infusion: 33.2 mL    ns in tub fed Peptamen Intense VHP: 400 mL  Total IN: 1230.4 mL    OUT:    Indwelling Catheter - Urethral: 1670 mL  Total OUT: 1670 mL    Total NET: -439.6 mL          LABS:                          8.3    7.45  )-----------( 173      ( 01 May 2020 04:30 )             25.0         01 May 2020 04:30    137    |  106    |  57     ----------------------------<  148    3.8     |  19     |  0.7      Ca    7.9        01 May 2020 04:30  Mg     1.6       01 May 2020 04:30    TPro  4.9    /  Alb  2.6    /  TBili  0.9    /  DBili  x      /  AST  12     /  ALT  9      /  AlkPhos  47     01 May 2020 04:30  Amylase x     lipase x                                                 PT/INR - ( 01 May 2020 04:30 )   PT: 13.20 sec;   INR: 1.15 ratio         PTT - ( 01 May 2020 04:30 )  PTT:28.7 sec                                       Urinalysis Basic - ( 2020 05:00 )    Color: Yellow / Appearance: Clear / S.020 / pH: x  Gluc: x / Ketone: Negative  / Bili: Negative / Urobili: <2 mg/dL   Blood: x / Protein: Trace / Nitrite: Positive   Leuk Esterase: Negative / RBC: 2 /HPF / WBC 3 /HPF   Sq Epi: x / Non Sq Epi: 1 /HPF / Bacteria: Many          CARDIAC MARKERS ( 2020 05:00 )  x     / x     / 21 U/L / x     / x      CARDIAC MARKERS ( 2020 16:30 )  x     / x     / 25 U/L / x     / x      CARDIAC MARKERS ( 2020 08:35 )  x     / x     / 18 U/L / x     / x                                                                                                                                             ABG - ( 01 May 2020 01:21 )  pH, Arterial: 7.45  pH, Blood: x     /  pCO2: 28    /  pO2: 144   / HCO3: 19    / Base Excess: -4.0  /  SaO2: 100     V60 28/10 25/50             MEDICATIONS  (STANDING):  artificial tears (preservative free) Ophthalmic Solution 1 Drop(s) Both EYES two times a day  aspirin  chewable 81 milliGRAM(s) Oral daily  chlorhexidine 0.12% Liquid 15 milliLiter(s) Oral Mucosa two times a day  chlorhexidine 4% Liquid 1 Application(s) Topical <User Schedule>  dexMEDEtomidine Infusion 0.2 MICROgram(s)/kG/Hr (5.9 mL/Hr) IV Continuous <Continuous>  dextrose 50% Injectable 12.5 Gram(s) IV Push once  dextrose 50% Injectable 25 Gram(s) IV Push once  dextrose 50% Injectable 25 Gram(s) IV Push once  fentaNYL   Infusion 0.5 MICROgram(s)/kG/Hr (5.9 mL/Hr) IV Continuous <Continuous>  folic acid 1 milliGRAM(s) Oral daily  furosemide   Injectable 40 milliGRAM(s) IV Push daily  insulin glargine Injectable (LANTUS) 18 Unit(s) SubCutaneous at bedtime  insulin lispro (HumaLOG) corrective regimen sliding scale   SubCutaneous three times a day before meals  insulin lispro Injectable (HumaLOG) 6 Unit(s) SubCutaneous every 6 hours  investigational medication - IVPB 100 milliGRAM(s) IV Intermittent <User Schedule>  norepinephrine Infusion 0.05 MICROgram(s)/kG/Min (11.1 mL/Hr) IV Continuous <Continuous>  pantoprazole   Suspension 40 milliGRAM(s) Oral daily  polyethylene glycol 3350 17 Gram(s) Oral two times a day  senna 2 Tablet(s) Oral at bedtime    MEDICATIONS  (PRN):  acetaminophen   Tablet .. 650 milliGRAM(s) Oral every 6 hours PRN Temp greater or equal to 38C (100.4F)  dextrose 40% Gel 15 Gram(s) Oral once PRN Blood Glucose LESS THAN 70 milliGRAM(s)/deciliter  glucagon  Injectable 1 milliGRAM(s) IntraMuscular once PRN Glucose LESS THAN 70 milligrams/deciliter          Xrays:  TLC:  OG:  ET tube:   b/l opacity                                                                                 b/l opacity    ECHO:  [facility] ICU:

## 2020-05-01 NOTE — PROGRESS NOTE ADULT - NSTELEHEALTH_GEN_ALL_CORE
"Progress Note  [facility]  Admit Date: 12/3/2017   LOS: 5 days   SUBJECTIVE:   Follow-up For: s/p Right hip hemiarthroplasty POD 3    Patient seen and examined this AM. States she is ready for discharge and "does not want to stay here". Right hip pain controlled. Awaiting bowel movement. Reports that throat feels dry.    ROS  Denies CP, N/V, abdominal pain    OBJECTIVE:   Vital Signs (Most Recent)  Temp: 97.1 °F (36.2 °C) (12/08/17 0032)  Pulse: 73 (12/08/17 0032)  Resp: 18 (12/07/17 2015)  BP: (!) 127/58 (12/08/17 0032)  SpO2: 97 % (12/08/17 0715)    I & O (Last 24H):  Intake/Output Summary (Last 24 hours) at 12/08/17 0810  Last data filed at 12/08/17 0537   Gross per 24 hour   Intake              500 ml   Output              614 ml   Net             -114 ml       Current Diet Order   Procedures    Diet Adult Regular    Diet Cardiac        Physical Exam  General: AAOx3. NAD.  HEENT: NCAT. NC in place. Resolving small ecchymosis under bilateral eyelids. Poor dentition  Neck: Supple. No JVD. No LAD.    CV: RRR. NL S1/S2. No murmurs  Chest: clear to auscultation bilaterally, normal effort  Abd: +BS x 4. Soft. ND/NT.   Ext: right hip bandaged, c/d/i  Neuro: No focal deficit.   Psych: Good judgement and reason. NL affect. No abnormal behaviors noted      Laboratory Data:  CBC    Recent Labs  Lab 12/05/17  0430 12/06/17  0814 12/07/17 0435   WBC 6.56 10.29 8.86   RBC 2.88* 2.85* 2.48*   HGB 8.6* 8.4* 7.4*   HCT 26.3* 26.7* 23.5*    179 162   MCV 91 94 95   MCH 29.9 29.5 29.8   MCHC 32.7 31.5* 31.5*     CMP    Recent Labs  Lab 12/05/17  0430 12/06/17  0547 12/07/17 0435   CALCIUM 8.1* 8.2* 8.1*   PROT 6.0 6.0 5.6*   * 134* 133*   K 3.4* 3.9 3.7   CO2 29 25 27   CL 98 100 101   BUN 17 20 27*   CREATININE 0.8 1.0 1.3   ALKPHOS 70 73 66   ALT 51* 42 36   AST 79* 71* 74*   BILITOT 0.8 0.8 0.7     POCT-Glucose  POCT Glucose   Date Value Ref Range Status   12/06/2017 138 (H) 70 - 110 mg/dL Final "   12/06/2017 153 (H) 70 - 110 mg/dL Final   12/06/2017 129 (H) 70 - 110 mg/dL Final   12/05/2017 128 (H) 70 - 110 mg/dL Final   12/05/2017 138 (H) 70 - 110 mg/dL Final     COAGS    Recent Labs  Lab 12/05/17  0430 12/06/17  0547 12/07/17  0435   INR 1.1 1.1 1.1   APTT 30.1 30.0 31.8     ASSESSMENT/PLAN:   Alyssa Hastings is a 88 y.o. female admitted for right femoral fracture POD 3 s/p right femoral neck fracture repair awaiting SNF placement.            Closed displaced fracture of right femoral neck     - 2/2 mechanical fall   - POD 3 right femoral neck fracture repair   -Awaiting discharge to SNF per PT/OT recs       Hypothyroidism     - continue synthroid 50 mcg          Essential hypertension     - acutely elevated in ED on admit.  Likely 2/2 to pain  - On home medications: lisinopril 20 mg and metoprolol  -BP stable, 120-140s/50-70s         History of CABG         -Per chart check hx of CABG x2          -2D echo on admit, EF 55%, moderate MVR and TVR           -On home Lisinopril and beta blocker. Per chart check,                    allergy to statin      Dispo: Awaiting placement        12/8/2017 Jodi Rowan MD  8:10 AM     No

## 2020-05-01 NOTE — PROGRESS NOTE ADULT - SUBJECTIVE AND OBJECTIVE BOX
Post Operative Check    Patient is post op from a Tracheostomy, with gastrostomy and PEG tube insertion ([id number])      Vitals    T(C): 37.1 (20 @ 16:00), Max: 37.8 (20 @ 07:30)  HR: 94 (20 @ 16:00) (80 - 104)  BP: 93/51 (20 @ 13:15) (92/52 - 169/72)  RR: 25 (20 @ 16:00) (16 - 30)  SpO2: 100% (20 @ 16:00) (96% - 100%)  Wt(kg): --       @ 07:01  -   @ 07:00  --------------------------------------------------------  IN:    dexmedetomidine Infusion: 58.6 mL    Enteral Tube Flush: 500 mL    fentaNYL  Infusion: 53.1 mL    Free Water: 250 mL    norepinephrine Infusion: 55.3 mL    ns in tub fed Peptamen Intense VHP: 400 mL  Total IN: 1317 mL    OUT:    Indwelling Catheter - Urethral: 1820 mL  Total OUT: 1820 mL    Total NET: -503 mL       @ 07:01  -  05 @ 18:25  --------------------------------------------------------  IN:    dexmedetomidine Infusion: 29.1 mL    dexmedetomidine Infusion: 194.7 mL    Enteral Tube Flush: 150 mL    fentaNYL  Infusion: 94.5 mL    fentaNYL  Infusion: 82.6 mL    fentaNYL  Infusion: 35.4 mL    IV PiggyBack: 250 mL    norepinephrine Infusion: 139.3 mL    norepinephrine Infusion: 22.1 mL  Total IN: 997.7 mL    OUT:    Indwelling Catheter - Urethral: 650 mL  Total OUT: 650 mL    Total NET: 347.7 mL          Physical Exam    Resp: Trach in place no bleeding  Abdomen: PEG no drainage, dressing intact                          8.3    7.45  )-----------( 173      ( 01 May 2020 04:30 )             25.0       Auto Neutrophil %: 68.7 % (20 @ 12:57)  Auto Immature Granulocyte %: 2.0 % (20 @ 12:57)        137  |  106  |  57<H>  ----------------------------<  148<H>  3.8   |  19  |  0.7      Calcium, Total Serum: 7.9 mg/dL (20 @ 04:30)      LFTs:             4.9  | 0.9  | 12       ------------------[47      ( 01 May 2020 04:30 )  2.6  | x    | 9           Lipase:x      Amylase:x         Blood Gas Arterial, Lactate: 0.8 mmoL/L (20 @ 01:21)  Blood Gas Arterial, Lactate: 1.1 mmoL/L (20 @ 02:38)  Blood Gas Arterial, Lactate: 0.9 mmoL/L (20 @ 05:56)  Blood Gas Arterial, Lactate: 0.7 mmoL/L (20 @ 22:37)    ABG - ( 01 May 2020 01:21 )  pH: 7.45  /  pCO2: 28    /  pO2: 144   / HCO3: 19    / Base Excess: -4.0  /  SaO2: 100             ABG - ( 2020 02:38 )  pH: 7.42  /  pCO2: 33    /  pO2: 105   / HCO3: 22    / Base Excess: -2.4  /  SaO2: 99              ABG - ( 2020 05:56 )  pH: 7.48  /  pCO2: 24    /  pO2: 143   / HCO3: 18    / Base Excess: -4.7  /  SaO2: 100               Coags:     13.20  ----< 1.15    ( 01 May 2020 04:30 )     28.7        CARDIAC MARKERS ( 2020 05:00 )  x     / x     / 21 U/L / x     / x              Urinalysis Basic - ( 01 May 2020 13:26 )    Color: Yellow / Appearance: Slightly Turbid / S.020 / pH: x  Gluc: x / Ketone: Negative  / Bili: Negative / Urobili: <2 mg/dL   Blood: x / Protein: 30 mg/dL / Nitrite: Positive   Leuk Esterase: Large / RBC: 3 /HPF /  /HPF

## 2020-05-01 NOTE — PROGRESS NOTE ADULT - ASSESSMENT
70 year old female +COVID with respiratory failure  s/p tracheostomy and PEG  Stable on vent  May use PEG in AM for feeds if no bleeding.

## 2020-05-01 NOTE — PROGRESS NOTE ADULT - ASSESSMENT
· Assessment		  70y F w/ PMH of asthma (never hospitalized, never intubated), CAD s/p stent (07/2018), HTN, HLD, and DM (diet-controlled) presents from [facility] w/ the chief complaint of shortness of breath, fevers, chills for 1 week and has been progressively worsening.    IMPRESSION;   COVID19 with resolved septic shock , resp failure, mechanical ventilation with ARDS with FiO2 50 %, secondary to Cytokine Release Syndrome  -inflammatory markers elevated  -elevated Ddimer is a poor prognostic indicator and differentiate survivors from non survivors  -procalcitonin of 0.65 > 0.47 suggestive of a bacterial PNA/infection   -ferritin 102  -Ddimers 2411  -was on azithro/ merem in [place]   -BCx NG  s/p Anakinra    RECOMMENDATIONS;  Remdesivir since 4/23. 10 days in all. Little response  Daily CBC BMP PT PTT UA  S/p PLAQUENIL   s/p Anakinra  4/15-18  poor prognosis  anticoagulation  NO NEED TO REPEAT INFLAMMATORY MARKERS

## 2020-05-01 NOTE — CHART NOTE - NSCHARTNOTEFT_GEN_A_CORE
Registered Dietitian Follow-Up     Patient Profile Reviewed                           Yes [x]   No []     Nutrition History Previously Obtained        Yes []  No [x]       Pertinent Medical Interventions: OR for trach + PEG. Hypoxic respiratory failure. ARDS. BRIDGETTE.      Diet order: NPO     Anthropometrics:  - Ht. 165.1cm  - Wt. dosing 118 kg/260 lbs  - %wt change no new wt since previous assessment  - BMI 43.9  - IBW 56.8 kg/125 lbs +/-10%     Pertinent Lab Data: (5/1) H/H 8.3/25.0  POCT glucose 151  BUN 57  Mg 1.6      Pertinent Meds: heparin, lantus, humalog, precedex, miralax, senna, lasix, levophed, folic acid, protonix     Physical Findings:  - Appearance: obese; 1+ generalized edema  - GI function: LBM 4/29  - Tubes: OR for PEG   - Oral/Mouth cavity: NPO  - Skin: R heel sDTI     Nutrition Requirements  Weight Used: 118kg  adjusted for s/p trach      Estimated Energy Needs   1707 - 1878 kcals/day (MSJ x 1.0 - 1.1 AF obesity)    Estimated Protein Needs   68 - 80 gms/day (1.2 - 1.4 gm/kg IBW      Estimated Fluid Needs        per LIP       Nutrient Intake: NPO     [] Previous Nutrition Diagnosis: Inadequate protein/energy intake            [x] Ongoing          [] Resolved    [] No active nutrition diagnosis identified at this time     Nutrition Intervention EN      Goal/Expected Outcome: Pt to meet % of needs in 4 days     Indicator/Monitoring: RD to monitor energy intake, diet order, body comp, NFPF, glucose profile     Recommendation:   - Start Jevity 1.2 @ 360ml q6hr (or 60ml/hr) [provides 1728 kcals, 79 gms protein, 1166ml free water] Flushes per LIP

## 2020-05-01 NOTE — BRIEF OPERATIVE NOTE - NSICDXBRIEFPROCEDURE_GEN_ALL_CORE_FT
PROCEDURES:  Tracheostomy, with gastrostomy and PEG tube insertion 01-May-2020 10:51:28  Rober Tavera

## 2020-05-01 NOTE — PROGRESS NOTE ADULT - SUBJECTIVE AND OBJECTIVE BOX
SUBJECTIVE:    Patient is a 70y old  Female who presents with a chief complaint of shortness of breath (01 May 2020 07:43)    Currently admitted to medicine with the primary diagnosis of Asthma exacerbation     Today is hospital day 18d. Patient was seen and examined at bedside. Sedated, intubated, mechanically ventilated.     PAST MEDICAL & SURGICAL HISTORY  PAST MEDICAL & SURGICAL HISTORY:  Depression  Anxiety  Dyslipidemia  Diabetes mellitus  Hypertension    SOCIAL HISTORY:    ALLERGIES:  No Known Allergies    MEDICATIONS:  STANDING MEDICATIONS    PRN MEDICATIONS    VITALS:   T(F): 100  HR: 90  BP: 169/72  RR: 25  SpO2: 100%    LABS:                        8.3    7.45  )-----------( 173      ( 01 May 2020 04:30 )             25.0     05-01    137  |  106  |  57<H>  ----------------------------<  148<H>  3.8   |  19  |  0.7    Ca    7.9<L>      01 May 2020 04:30  Mg     1.6     05-    TPro  4.9<L>  /  Alb  2.6<L>  /  TBili  0.9  /  DBili  x   /  AST  12  /  ALT  9   /  AlkPhos  47  05-01    PT/INR - ( 01 May 2020 04:30 )   PT: 13.20 sec;   INR: 1.15 ratio         PTT - ( 01 May 2020 04:30 )  PTT:28.7 sec  Urinalysis Basic - ( 2020 05:00 )    Color: Yellow / Appearance: Clear / S.020 / pH: x  Gluc: x / Ketone: Negative  / Bili: Negative / Urobili: <2 mg/dL   Blood: x / Protein: Trace / Nitrite: Positive   Leuk Esterase: Negative / RBC: 2 /HPF / WBC 3 /HPF   Sq Epi: x / Non Sq Epi: 1 /HPF / Bacteria: Many      ABG - ( 01 May 2020 01:21 )  pH, Arterial: 7.45  pH, Blood: x     /  pCO2: 28    /  pO2: 144   / HCO3: 19    / Base Excess: -4.0  /  SaO2: 100                   CARDIAC MARKERS ( 2020 05:00 )  x     / x     / 21 U/L / x     / x      CARDIAC MARKERS ( 2020 16:30 )  x     / x     / 25 U/L / x     / x          RADIOLOGY:  < from: CT Abdomen and Pelvis No Cont (20 @ 10:58) >  IMPRESSION:    1.  No evidence of retroperitoneal hematoma or other acute/inflammatory process within the abdomen or pelvis.    2.  Colonic diverticulosis without evidence of acute diverticulitis.    3.  Partially imaged diffuse bibiasilar groundglass and consolidative opacities, consistent with atypical/viral pneumonia in the appropriate clinical setting.    < end of copied text >    < from: CT Head No Cont (20 @ 10:27) >  IMPRESSION:     No CT evidence of acute intracranial pathology.      < end of copied text >    PHYSICAL EXAM:  GENERAL: sedated, intubated, mechanically venitlated  HEAD: Atraumatic  CHEST/LUNG: Clear to auscultation bilaterally; No wheeze or crackles  HEART: S1, S2; RRR  ABDOMEN: BS+; Soft  EXTREMITIES:  2+ Peripheral Pulses

## 2020-05-02 NOTE — CHART NOTE - NSCHARTNOTEFT_GEN_A_CORE
Called Adolfo Herrera at [phone] with no answer.  Left voice message regarding call tomorrow.  Called wife's cell number with no answer and left voice message.  D/W Medical resident; team will attempt daily update at a later time, if possible Called Adolfo Herrera at [phone] with no answer.  Left voice message regarding call tomorrow.  Called wife's cell number with no answer and left voice message.  D/W Medical resident; team will attempt daily update at a later time, if possible    Attempted to reach out to Mr. Herrera without answer. Voicemail left earlier, will recall back tomorrow or if any changes in clinical status occur.

## 2020-05-02 NOTE — PROGRESS NOTE ADULT - SUBJECTIVE AND OBJECTIVE BOX
Patient is a 70y old  Female who presents with a chief complaint of shortness of breath (02 May 2020 00:17)      Over Night Events:  Patient seen and examined.   s/p tracheostomy and peg tube     ROS:  See HPI    PHYSICAL EXAM    ICU Vital Signs Last 24 Hrs  T(C): 37 (01 May 2020 20:00), Max: 37.1 (01 May 2020 16:00)  T(F): 98.6 (01 May 2020 20:00), Max: 98.7 (01 May 2020 16:00)  HR: 112 (02 May 2020 06:31) (83 - 112)  BP: 93/51 (01 May 2020 13:15) (92/52 - 132/66)  BP(mean): 65 (01 May 2020 13:15) (61 - 87)  ABP: 145/68 (02 May 2020 06:31) (91/54 - 216/127)  ABP(mean): 68 (01 May 2020 19:00) (63 - 157)  RR: 28 (02 May 2020 06:31) (16 - 30)  SpO2: 100% (02 May 2020 06:31) (95% - 100%)      General: on sedation   HEENT:   peg tube              Lymph Nodes: NO cervical LN   Lungs: Bilateral BS  Cardiovascular: Regular   Abdomen: Soft, Positive BS  Extremities: No clubbing   Skin: warm   Neurological: on sedation   Musculoskeletal: move all ext     I&O's Detail    01 May 2020 07:01  -  02 May 2020 07:00  --------------------------------------------------------  IN:    dexmedetomidine Infusion: 29.1 mL    dexmedetomidine Infusion: 607.8 mL    Enteral Tube Flush: 150 mL    fentaNYL  Infusion: 35.4 mL    fentaNYL  Infusion: 82.6 mL    fentaNYL  Infusion: 401.3 mL    IV PiggyBack: 300 mL    norepinephrine Infusion: 22.1 mL    norepinephrine Infusion: 524.2 mL  Total IN: 2152.5 mL    OUT:    Indwelling Catheter - Urethral: 1630 mL    Other: 300 mL    PEG (Percutaneous Endoscopic Gastrostomy) Tube: 200 mL  Total OUT: 2130 mL    Total NET: 22.5 mL          LABS:                          8.4    9.10  )-----------( 173      ( 02 May 2020 06:28 )             26.4         02 May 2020 06:28    143    |  109    |  51     ----------------------------<  124    3.6     |  21     |  0.7      Ca    8.1        02 May 2020 06:28  Mg     2.1       02 May 2020 06:28    TPro  5.1    /  Alb  2.6    /  TBili  0.8    /  DBili  x      /  AST  16     /  ALT  10     /  AlkPhos  58     02 May 2020 06:28  Amylase x     lipase x                                                 PT/INR - ( 02 May 2020 06:28 )   PT: 12.40 sec;   INR: 1.08 ratio         PTT - ( 01 May 2020 04:30 )  PTT:28.7 sec                                       Urinalysis Basic - ( 01 May 2020 13:26 )    Color: Yellow / Appearance: Slightly Turbid / S.020 / pH: x  Gluc: x / Ketone: Negative  / Bili: Negative / Urobili: <2 mg/dL   Blood: x / Protein: 30 mg/dL / Nitrite: Positive   Leuk Esterase: Large / RBC: 3 /HPF /  /HPF   Sq Epi: x / Non Sq Epi: 1 /HPF / Bacteria: Many                                                                                                             Mode: PCV  RR (machine): 25  FiO2: 60  PEEP: 10  ITime: 0.8  PC: 28  PIP: 26                                      ABG - ( 02 May 2020 02:59 )  pH, Arterial: 7.24  pH, Blood: x     /  pCO2: 49    /  pO2: 64    / HCO3: 21    / Base Excess: -6.4  /  SaO2: 91                  MEDICATIONS  (STANDING):  artificial tears (preservative free) Ophthalmic Solution 1 Drop(s) Both EYES two times a day  aspirin  chewable 81 milliGRAM(s) Oral daily  chlorhexidine 0.12% Liquid 15 milliLiter(s) Oral Mucosa two times a day  chlorhexidine 4% Liquid 1 Application(s) Topical <User Schedule>  dexMEDEtomidine Infusion 0.2 MICROgram(s)/kG/Hr (5.9 mL/Hr) IV Continuous <Continuous>  fentaNYL   Infusion 0.5 MICROgram(s)/kG/Hr (5.9 mL/Hr) IV Continuous <Continuous>  folic acid 1 milliGRAM(s) Oral daily  furosemide   Injectable 40 milliGRAM(s) IV Push daily  heparin   Injectable 5000 Unit(s) SubCutaneous every 8 hours  insulin glargine Injectable (LANTUS) 18 Unit(s) SubCutaneous at bedtime  insulin lispro (HumaLOG) corrective regimen sliding scale   SubCutaneous three times a day before meals  insulin lispro Injectable (HumaLOG) 6 Unit(s) SubCutaneous every 6 hours  investigational medication - IVPB 100 milliGRAM(s) IV Intermittent <User Schedule>  norepinephrine Infusion 0.05 MICROgram(s)/kG/Min (11.1 mL/Hr) IV Continuous <Continuous>  pantoprazole   Suspension 40 milliGRAM(s) Oral daily  polyethylene glycol 3350 17 Gram(s) Oral two times a day  senna 2 Tablet(s) Oral at bedtime    MEDICATIONS  (PRN):  acetaminophen    Suspension .. 650 milliGRAM(s) Enteral Tube every 6 hours PRN Temp greater or equal to 38C (100.4F)          Xrays:  TLC:  OG:  ET tube:                                                                                    b/l opacity    ECHO:  [facility] ICU:

## 2020-05-02 NOTE — PROGRESS NOTE ADULT - ASSESSMENT
70 year old female +COVID with respiratory failure, s/p  Tracheostomy, and PEG tube insertion     Plan:   May use PEG in AM for feeds if no bleeding.

## 2020-05-02 NOTE — PROGRESS NOTE ADULT - ASSESSMENT
· Assessment		  70y F w/ PMH of asthma (never hospitalized, never intubated), CAD s/p stent (07/2018), HTN, HLD, and DM (diet-controlled) presents from [facility] w/ the chief complaint of shortness of breath, fevers, chills for 1 week and has been progressively worsening.    IMPRESSION;   COVID19 with resolved septic shock , resp failure, mechanical ventilation with ARDS with FiO2 50 %, secondary to Cytokine Release Syndrome  -s/p trach/PEG  -inflammatory markers elevated  -elevated Ddimer is a poor prognostic indicator and differentiate survivors from non survivors  -procalcitonin of 0.65 > 0.47 suggestive of a bacterial PNA/infection   -ferritin 102  -Ddimers 2411  -was on azithro/ merem in [place]   -BCx NG  s/p Anakinra    RECOMMENDATIONS;  Remdesivir since 4/23. 10 days in all. Little response  Daily CBC BMP PT PTT UA  S/p PLAQUENIL   s/p Anakinra  4/15-18  poor prognosis  anticoagulation

## 2020-05-02 NOTE — PROGRESS NOTE ADULT - SUBJECTIVE AND OBJECTIVE BOX
JEREMIAH, IRENE  70y, Female  Allergy: No Known Allergies      LOS  19d    CHIEF COMPLAINT: shortness of breath (02 May 2020 07:34)      INTERVAL EVENTS/HPI  - T(F): , Max: 99.1 (20 @ 12:17) s/p trach, PEG  - WBC Count: 8.21 (20 @ 13:30)  WBC Count: 9.10 (20 @ 06:28)  - Creatinine, Serum: 0.7 (20 @ 06:28)  Creatinine, Serum: 0.7 (20 @ 04:30)         ROS  cannot obtain secondary to patient's sedation and/or mental status    VITALS:  T(F): 99.1, Max: 99.1 (20 @ 12:17)  HR: 68  BP: --  RR: 27Vital Signs Last 24 Hrs  T(C): 37.3 (02 May 2020 12:17), Max: 37.3 (02 May 2020 12:17)  T(F): 99.1 (02 May 2020 12:17), Max: 99.1 (02 May 2020 12:17)  HR: 68 (02 May 2020 13:35) (68 - 112)  BP: --  BP(mean): --  RR: 27 (02 May 2020 13:35) (25 - 30)  SpO2: 100% (02 May 2020 13:35) (95% - 100%)    PHYSICAL EXAM:  Gen: trach/PEG  CV: RRR  Lungs: Bilateral chest expansion  Neuro: Sedated  Lines    FH: Non-contributory  Social Hx: Non-contributory    TESTS & MEASUREMENTS:                        7.9    8.21  )-----------( 140      ( 02 May 2020 13:30 )             23.9     05-    143  |  109  |  51<H>  ----------------------------<  124<H>  3.6   |  21  |  0.7    Ca    8.1<L>      02 May 2020 06:28  Mg     2.1     -    TPro  5.1<L>  /  Alb  2.6<L>  /  TBili  0.8  /  DBili  x   /  AST  16  /  ALT  10  /  AlkPhos  58  05-02    eGFR if Non African American: 88 mL/min/1.73M2 (20 @ 06:28)  eGFR if African American: 102 mL/min/1.73M2 (20 @ 06:28)    LIVER FUNCTIONS - ( 02 May 2020 06:28 )  Alb: 2.6 g/dL / Pro: 5.1 g/dL / ALK PHOS: 58 U/L / ALT: 10 U/L / AST: 16 U/L / GGT: x           Urinalysis Basic - ( 02 May 2020 13:30 )    Color: Light Yellow / Appearance: Clear / S.011 / pH: x  Gluc: x / Ketone: Negative  / Bili: Negative / Urobili: <2 mg/dL   Blood: x / Protein: Negative / Nitrite: Negative   Leuk Esterase: Negative / RBC: x / WBC x   Sq Epi: x / Non Sq Epi: x / Bacteria: x        Culture - Blood (collected 20 @ 11:00)  Source: .Blood Blood  Final Report (20 @ 23:01):    No Growth Final    Culture - Blood (collected 20 @ 08:21)  Source: .Blood None  Final Report (20 @ 13:01):    No Growth Final    Culture - Blood (collected 20 @ 08:21)  Source: .Blood None  Final Report (20 @ 13:01):    No Growth Final            INFECTIOUS DISEASES TESTING  Procalcitonin, Serum: 0.43 (20 @ 04:20)  Procalcitonin, Serum: 0.53 (20 @ 04:30)  Procalcitonin, Serum: 0.47 (20 @ 06:00)  Procalcitonin, Serum: 0.56 (20 @ 17:36)  Procalcitonin, Serum: 1.11 (04-15-20 @ 04:30)  Procalcitonin, Serum: 1.11 (20 @ 05:30)  COVID-19 PCR: Detected (20 @ 16:30)  Procalcitonin, Serum: 0.65 (20 @ 09:37)      INFLAMMATORY MARKERS  C-Reactive Protein, Serum: 2.64 mg/dL (20 @ 04:20)  C-Reactive Protein, Serum: 2.06 mg/dL (20 @ 04:30)  C-Reactive Protein, Serum: 4.50 mg/dL (20 @ 06:00)  C-Reactive Protein, Serum: 6.43 mg/dL (20 @ 17:36)      RADIOLOGY & ADDITIONAL TESTS:  I have personally reviewed the last available Chest xray  CXR  Xray Chest 1 View- PORTABLE-Urgent:   EXAM:  XR CHEST PORTABLE URGENT 1V            PROCEDURE DATE:  2020            INTERPRETATION:  Clinical History / Reason for exam: Shortness of breath.    Comparison : Chest radiograph dated 2020.    Technique/Positioning: Portablefrontal.    Findings:    Support devices: Endotracheal tube, nasogastric tube and left central venous catheter are in stable position. Overlying EKG leads.    Cardiac/mediastinum/hilum: Stable.    Lung parenchyma/Pleura: Unchanged diffuse bilateral airspace opacities. No pneumothorax.    Skeleton/soft tissues: Stable.    Impression:      1. Unchanged diffuse bilateral airspace opacities.                      LISANDRO MARLEY M.D., ATTENDING RADIOLOGIST  This document has been electronically signed. 2020  1:47PM             (20 @ 13:39)      CT  CT Abdomen and Pelvis No Cont:   EXAM:  CT ABDOMEN AND PELVIS            PROCEDURE DATE:  2020            INTERPRETATION:  CLINICAL STATEMENT: Retroperitoneal bleed.      TECHNIQUE: Contiguous axial CT images were obtained from the lower chest to the pubic symphysis without intravenous contrast.  Oral contrast was not administered.  Reformatted images in the coronal and sagittal planes were acquired.    COMPARISON CT: 2015      FINDINGS:    LOWER CHEST: Partially imaged bibasilar diffuse groundglass and consolidatedopacities.     HEPATOBILIARY: Unremarkable.    SPLEEN: Unremarkable.    PANCREAS: Unremarkable.    ADRENAL GLANDS: Unremarkable.    KIDNEYS: No hydronephrosis bilaterally. A 3mm nonobstructing right renal lower pole calculus is noted.    ABDOMINOPELVIC NODES: Unremarkable.    PELVIC ORGANS: Anguiano catheter balloon noted within a collapsed urinary bladder.    PERITONEUM/MESENTERY/BOWEL: Nasogastric tube with distal tip within the stomach. Colonic diverticulosis most notably within the sigmoid colon, without evidence of acute diverticulitis. No evidence of bowel obstruction or pneumoperitoneum.    BONES/SOFT TISSUES: Degenerative changes of the spine and bilateral hips.     OTHER: Atherosclerotic vascular calcification.      IMPRESSION:    1.  No evidence of retroperitoneal hematoma or other acute/inflammatory process within the abdomen or pelvis.    2.  Colonic diverticulosis without evidence of acute diverticulitis.    3.  Partially imaged diffuse bibiasilar groundglass and consolidative opacities, consistent with atypical/viral pneumonia in the appropriate clinical setting.                CHELSIE MATTHEWS M.D., RESIDENT RADIOLOGIST  This document has been electronically signed.  LISANDRO MARLEY M.D., ATTENDING RADIOLOGIST  This document has been electronically signed. 2020 11:52AM             (20 @ 10:58)      CARDIOLOGY TESTING  12 Lead ECG:   Ventricular Rate 82 BPM    Atrial Rate 82 BPM    P-R Interval 210 ms    QRS Duration 138 ms    Q-T Interval 424 ms    QTC Calculation(Bezet) 495 ms    P Axis 51 degrees    R Axis -57 degrees    T Axis 42 degrees    Diagnosis Line Sinus rhythm with 1st degree A-V block  Right bundle branch block  Left anterior fascicular block  *** Bifascicular block ***  Anterolateral infarct , age undetermined  Abnormal ECG    Confirmed by Yuri Dugan ([id number]) on 2020 2:01:34 PM (20 @ 10:40)  12 Lead ECG:   Ventricular Rate 128 BPM    Atrial Rate 129 BPM    P-R Interval 96 ms    QRS Duration 122 ms    Q-T Interval 328 ms    QTC Calculation(Bezet) 478 ms    P Axis 70 degrees    R Axis -65 degrees    T Axis 13 degrees    Diagnosis Line Sinus tachycardiawith short PA  Left axis deviation  Right bundle branch block  Inferior infarct , age undetermined  Anterolateral infarct , age undetermined  Abnormal ECG    Confirmed by Laureano Cruz ([id number]) on 2020 10:29:54 PM (20 @ 20:47)      MEDICATIONS  artificial tears (preservative free) Ophthalmic Solution 1  aspirin  chewable 81  chlorhexidine 0.12% Liquid 15  chlorhexidine 4% Liquid 1  dexMEDEtomidine Infusion 0.2  fentaNYL   Infusion 0.5  folic acid 1  furosemide   Injectable 40  heparin   Injectable 7500  insulin glargine Injectable (LANTUS) 18  insulin lispro (HumaLOG) corrective regimen sliding scale   insulin lispro Injectable (HumaLOG) 6  investigational medication - IVPB 100  norepinephrine Infusion 0.05  pantoprazole   Suspension 40  polyethylene glycol 3350 17  propofol Infusion 30  senna 2      WEIGHT  Weight (kg): 118 (20 @ 07:17)  Creatinine, Serum: 0.7 mg/dL (20 @ 06:28)      ANTIBIOTICS:      All available historical records have been reviewed

## 2020-05-02 NOTE — CHART NOTE - NSCHARTNOTEFT_GEN_A_CORE
Pt dessated to 68%, was suctioned, no I Pt dessated to 68%, was suctioned, no improvement, was breathing over the vent (rate was 25 but was . Am CXR show worsening b/l opacities, AM ABG c/w worsening respiratory acidosis. Pt given Lasi Pt dessated to 68%, was suctioned, no improvement, breathing over the vent, maxed on Precedex. Am CXR shows worsening b/l opacities, AM ABG c/w worsening respiratory acidosis. Pt given Lasix 60mg IV push once, started propofol. Following vent setting changes were made: RR 25 -> 27, FiO2 60 ->100, Peep 10 ->14, pulling ~270.  O2 sat now 94%.

## 2020-05-02 NOTE — PROGRESS NOTE ADULT - ASSESSMENT
70y F w/ PMH of asthma (never hospitalized, never intubated), CAD s/p stent (07/2018), HTN, HLD, and DM (diet-controlled) presents from [facility] w/ the chief complaint of shortness of breath, fevers, chills for 1 week and has been progressively worsening.    Impression   ARF hypoxic   -r/o COVID  -Suspected GNR PNA  viral PNA secondary   -Asthma Exacerbation  ARDS  BRIDGETTE    Plan     1.CNS   on sedation   now off fentnayl and propofol  will start to taper terrie   2. CVS     echo 66% moderate LVH , puln htn ?? rv dilatation        3. PULMONARY   s/p trach   changed opreesure 32/12   rate 27   ABG in 2 hrs     4. INFECTIOUS DISEASE     on HC  off angeli   follow ID    s/p ANAKirna   on remdisiver study   follow up Id recommendation     5. GI  -GI ppx, protonix  check LFT   free water 250cc Q 4  hrs     6. RENAL  -replete electrolytes PRN  follow renal   on lasix Q 24 hrs    monitor Is and OS       7. Endocrine   -f/u FS, target FS <180    8. Hematology  follow h/h  is stable heparin Sq 7500 Q 8 hrs     11. Code Status   -full code

## 2020-05-02 NOTE — PROGRESS NOTE ADULT - SUBJECTIVE AND OBJECTIVE BOX
NIRMAL DANIELS  70y Female   [id number]    Procedure: Tracheostomy, and PEG tube insertion   24 hour events: patient went for Tracheostomy, and PEG tube insertion.     Patient is a 70y old  Female who presents with a chief complaint of shortness of breath (01 May 2020 18:24)    PAST MEDICAL & SURGICAL HISTORY:  Depression  Anxiety  Dyslipidemia  Diabetes mellitus  Hypertension      Vital Signs Last 24 Hrs  T(C): 37 (01 May 2020 20:00), Max: 37.8 (01 May 2020 07:30)  T(F): 98.6 (01 May 2020 20:00), Max: 100 (01 May 2020 07:30)  HR: 90 (02 May 2020 00:00) (83 - 104)  BP: 93/51 (01 May 2020 13:15) (92/52 - 132/66)  BP(mean): 65 (01 May 2020 13:15) (61 - 87)  RR: 30 (02 May 2020 00:00) (16 - 30)  SpO2: 96% (02 May 2020 00:00) (96% - 100%)    Mode: PCV, RR (machine): 25, FiO2: 60, PEEP: 10, ITime: 0.8, PC: 28, PIP: 26    Diet, NPO:   Except Medications     Special Instructions for Nursing:  Except Medications (20 @ 10:51)      I&O's Detail    2020 07:01  -  01 May 2020 07:00  --------------------------------------------------------  IN:    dexmedetomidine Infusion: 58.6 mL    Enteral Tube Flush: 500 mL    fentaNYL  Infusion: 53.1 mL    Free Water: 250 mL    norepinephrine Infusion: 55.3 mL    ns in tub fed Peptamen Intense VHP: 400 mL  Total IN: 1317 mL    OUT:    Indwelling Catheter - Urethral: 1820 mL  Total OUT: 1820 mL    Total NET: -503 mL      01 May 2020 07:01  -  02 May 2020 00:17  --------------------------------------------------------  IN:    dexmedetomidine Infusion: 29.1 mL    dexmedetomidine Infusion: 371.7 mL    Enteral Tube Flush: 150 mL    fentaNYL  Infusion: 35.4 mL    fentaNYL  Infusion: 82.6 mL    fentaNYL  Infusion: 236.1 mL    IV PiggyBack: 300 mL    norepinephrine Infusion: 278.6 mL    norepinephrine Infusion: 22.1 mL  Total IN: 1505.7 mL    OUT:    Indwelling Catheter - Urethral: 925 mL  Total OUT: 925 mL    Total NET: 580.7 mL          MEDICATIONS  (STANDING):  artificial tears (preservative free) Ophthalmic Solution 1 Drop(s) Both EYES two times a day  aspirin  chewable 81 milliGRAM(s) Oral daily  chlorhexidine 0.12% Liquid 15 milliLiter(s) Oral Mucosa two times a day  chlorhexidine 4% Liquid 1 Application(s) Topical <User Schedule>  dexMEDEtomidine Infusion 0.2 MICROgram(s)/kG/Hr (5.9 mL/Hr) IV Continuous <Continuous>  fentaNYL   Infusion 0.5 MICROgram(s)/kG/Hr (5.9 mL/Hr) IV Continuous <Continuous>  folic acid 1 milliGRAM(s) Oral daily  furosemide   Injectable 40 milliGRAM(s) IV Push daily  heparin   Injectable 5000 Unit(s) SubCutaneous every 8 hours  insulin glargine Injectable (LANTUS) 18 Unit(s) SubCutaneous at bedtime  insulin lispro (HumaLOG) corrective regimen sliding scale   SubCutaneous three times a day before meals  insulin lispro Injectable (HumaLOG) 6 Unit(s) SubCutaneous every 6 hours  investigational medication - IVPB 100 milliGRAM(s) IV Intermittent <User Schedule>  norepinephrine Infusion 0.05 MICROgram(s)/kG/Min (11.1 mL/Hr) IV Continuous <Continuous>  pantoprazole   Suspension 40 milliGRAM(s) Oral daily  polyethylene glycol 3350 17 Gram(s) Oral two times a day  senna 2 Tablet(s) Oral at bedtime    MEDICATIONS  (PRN):  acetaminophen    Suspension .. 650 milliGRAM(s) Enteral Tube every 6 hours PRN Temp greater or equal to 38C (100.4F)      PHYSICAL EXAM:  Neck: Tracheostomy in place no bleeding or hematoma  Abdomen: PEG tube in place, no bleeding noted    LABS:                         8.3    7.45  )-----------( 173      ( 01 May 2020 04:30 )             25.0        05-01    137  |  106  |  57<H>  ----------------------------<  148<H>  3.8   |  19  |  0.7    Ca    7.9<L>      01 May 2020 04:30  Mg     1.6     05-    TPro  4.9<L>  /  Alb  2.6<L>  /  TBili  0.9  /  DBili  x   /  AST  12  /  ALT  9   /  AlkPhos  47  05-01    LIVER FUNCTIONS - ( 01 May 2020 04:30 )  Alb: 2.6 g/dL / Pro: 4.9 g/dL / ALK PHOS: 47 U/L / ALT: 9 U/L / AST: 12 U/L / GGT: x           PT/INR - ( 01 May 2020 04:30 )   PT: 13.20 sec;   INR: 1.15 ratio         PTT - ( 01 May 2020 04:30 )  PTT:28.7 sec  CARDIAC MARKERS ( 2020 05:00 )  x     / x     / 21 U/L / x     / x          Urinalysis Basic - ( 01 May 2020 13:26 )    Color: Yellow / Appearance: Slightly Turbid / S.020 / pH: x  Gluc: x / Ketone: Negative  / Bili: Negative / Urobili: <2 mg/dL   Blood: x / Protein: 30 mg/dL / Nitrite: Positive   Leuk Esterase: Large / RBC: 3 /HPF /  /HPF   Sq Epi: x / Non Sq Epi: 1 /HPF / Bacteria: Many    IMAGING:  none

## 2020-05-03 NOTE — PROGRESS NOTE ADULT - ASSESSMENT
70y F w/ PMH of asthma (never hospitalized, never intubated), CAD s/p stent (07/2018), HTN, HLD, and DM (diet-controlled) presents from [facility] w/ the chief complaint of shortness of breath, fevers, chills for 1 week and has been progressively worsening.    Impression   ARF hypoxic   -r/o COVID  -Suspected GNR PNA  viral PNA secondary   -Asthma Exacerbation  ARDS  BRIDGETTE    Plan     1.CNS   on sedation hold fentanyl and propofol keep precedex   can give fentanyl or morphine for paon    2. CVS     echo 66% moderate LVH , puln htn ?? rv dilatation        3. PULMONARY   s/p trach   changed opreesure 26/10 and fio2 45 %   rate 27   ABG in 2 hrs   cxr   4. INFECTIOUS DISEASE     on HC  off angeli   follow ID    s/p ANAKirna   on remdisiver study   follow up Id recommendation     5. GI  -GI ppx, protonix  check LFT   free water 250cc Q 4  hrs     6. RENAL  -replete electrolytes PRN  follow renal   on lasix Q 24 hrs    monitor Is and OS   repalce K follow BMP     7. Endocrine   -f/u FS, target FS <180    8. Hematology  follow h/h  is stable heparin Sq 7500 Q 8 hrs   h/h afternoon     11. Code Status   -full code

## 2020-05-03 NOTE — PROGRESS NOTE ADULT - SUBJECTIVE AND OBJECTIVE BOX
SUBJECTIVE:    Patient is a 70y old  Female who presents with a chief complaint of shortness of breath (03 May 2020 14:39)    Currently admitted to medicine with the primary diagnosis of Asthma exacerbation     Today is hospital day 20d. Patient was seen and examined at bedside. Sedated, intubated, mechanically ventilated.     PAST MEDICAL & SURGICAL HISTORY  PAST MEDICAL & SURGICAL HISTORY:  Depression  Anxiety  Dyslipidemia  Diabetes mellitus  Hypertension    SOCIAL HISTORY:    ALLERGIES:  No Known Allergies    MEDICATIONS:  STANDING MEDICATIONS  artificial tears (preservative free) Ophthalmic Solution 1 Drop(s) Both EYES two times a day  aspirin  chewable 81 milliGRAM(s) Oral daily  chlorhexidine 0.12% Liquid 15 milliLiter(s) Oral Mucosa two times a day  chlorhexidine 4% Liquid 1 Application(s) Topical <User Schedule>  dexMEDEtomidine Infusion 0.2 MICROgram(s)/kG/Hr IV Continuous <Continuous>  folic acid 1 milliGRAM(s) Oral daily  furosemide   Injectable 40 milliGRAM(s) IV Push daily  heparin   Injectable 7500 Unit(s) SubCutaneous every 8 hours  insulin glargine Injectable (LANTUS) 18 Unit(s) SubCutaneous at bedtime  insulin lispro (HumaLOG) corrective regimen sliding scale   SubCutaneous three times a day before meals  insulin lispro Injectable (HumaLOG) 6 Unit(s) SubCutaneous every 6 hours  norepinephrine Infusion 0.05 MICROgram(s)/kG/Min IV Continuous <Continuous>  pantoprazole   Suspension 40 milliGRAM(s) Oral daily  polyethylene glycol 3350 17 Gram(s) Oral two times a day  propofol Infusion 30 MICROgram(s)/kG/Min IV Continuous <Continuous>  senna 2 Tablet(s) Oral at bedtime    PRN MEDICATIONS  acetaminophen    Suspension .. 650 milliGRAM(s) Enteral Tube every 6 hours PRN    VITALS:   T(F): 101.9  HR: 139  BP: --  RR: 27  SpO2: 92%    LABS:                        7.5    16.41 )-----------( 152      ( 03 May 2020 17:50 )             24.6     05-03    141  |  110  |  40<H>  ----------------------------<  97  3.1<L>   |  18  |  0.8    Ca    7.6<L>      03 May 2020 17:50  Mg     1.9     05-03    TPro  4.7<L>  /  Alb  2.3<L>  /  TBili  0.5  /  DBili  x   /  AST  14  /  ALT  8   /  AlkPhos  56  05-03    PT/INR - ( 03 May 2020 04:30 )   PT: 14.10 sec;   INR: 1.23 ratio         PTT - ( 02 May 2020 13:30 )  PTT:30.1 sec  Urinalysis Basic - ( 02 May 2020 13:30 )    Color: Light Yellow / Appearance: Clear / S.011 / pH: x  Gluc: x / Ketone: Negative  / Bili: Negative / Urobili: <2 mg/dL   Blood: x / Protein: Negative / Nitrite: Negative   Leuk Esterase: Negative / RBC: x / WBC x   Sq Epi: x / Non Sq Epi: x / Bacteria: x      ABG - ( 03 May 2020 09:58 )  pH, Arterial: 7.35  pH, Blood: x     /  pCO2: 34    /  pO2: 90    / HCO3: 19    / Base Excess: -6.3  /  SaO2: 98                        RADIOLOGY:  < from: Xray Chest 1 View-PORTABLE IMMEDIATE (20 @ 08:55) >  Impression:      1. Stable support lines and tubes  2. Bilateral opacities decreased on right since prior    < end of copied text >    PHYSICAL EXAM:  GENERAL: NAD, speaks in full sentences, no signs of respiratory distress  HEAD: Atraumatic  CHEST/LUNG: diminished breath sound b/l   HEART: S1, S2; RRR; No murmurs, rubs, or gallops  ABDOMEN: BS+; Soft  EXTREMITIES:  2+ Peripheral Pulses

## 2020-05-03 NOTE — PROGRESS NOTE ADULT - SUBJECTIVE AND OBJECTIVE BOX
JEREMIAH, IRENE  70y, Female  Allergy: No Known Allergies      LOS  20d    CHIEF COMPLAINT: shortness of breath (03 May 2020 07:34)      INTERVAL EVENTS/HPI  - T(F): , Max: 100.4 (20 @ 07:27)  - WBC Count: 11.04 (20 @ 09:54)  WBC Count: 11.02 (20 @ 04:30)  - Creatinine, Serum: 0.7 (20 @ 04:30)  Creatinine, Serum: 0.7 (20 @ 06:28)     -   -   -     ROS  cannot obtain secondary to patient's sedation and/or mental status    VITALS:  T(F): 99.7, Max: 100.4 (20 @ 07:27)  HR: 124  BP: --  RR: 35Vital Signs Last 24 Hrs  T(C): 37.6 (03 May 2020 13:24), Max: 38 (03 May 2020 07:27)  T(F): 99.7 (03 May 2020 13:24), Max: 100.4 (03 May 2020 07:27)  HR: 124 (03 May 2020 13:46) (64 - 124)  BP: --  BP(mean): --  RR: 35 (03 May 2020 13:46) (27 - 38)  SpO2: 91% (03 May 2020 13:46) (91% - 100%)    PHYSICAL EXAM:  Gen: Intubated  CV: RRR  Lungs: Bilateral chest expansion  Neuro: Sedated  Lines    FH: Non-contributory  Social Hx: Non-contributory    TESTS & MEASUREMENTS:                        7.6    11.04 )-----------( 153      ( 03 May 2020 09:54 )             23.2     05-    140  |  108  |  40<H>  ----------------------------<  161<H>  3.1<L>   |  17  |  0.7    Ca    7.9<L>      03 May 2020 04:30  Mg     1.9         TPro  4.7<L>  /  Alb  2.3<L>  /  TBili  0.5  /  DBili  x   /  AST  14  /  ALT  8   /  AlkPhos  56  05-    eGFR if Non African American: 88 mL/min/1.73M2 (20 @ 04:30)  eGFR if African American: 102 mL/min/1.73M2 (20 @ 04:30)    LIVER FUNCTIONS - ( 03 May 2020 04:30 )  Alb: 2.3 g/dL / Pro: 4.7 g/dL / ALK PHOS: 56 U/L / ALT: 8 U/L / AST: 14 U/L / GGT: x           Urinalysis Basic - ( 02 May 2020 13:30 )    Color: Light Yellow / Appearance: Clear / S.011 / pH: x  Gluc: x / Ketone: Negative  / Bili: Negative / Urobili: <2 mg/dL   Blood: x / Protein: Negative / Nitrite: Negative   Leuk Esterase: Negative / RBC: x / WBC x   Sq Epi: x / Non Sq Epi: x / Bacteria: x        Culture - Blood (collected 20 @ 11:00)  Source: .Blood Blood  Final Report (20 @ 23:01):    No Growth Final    Culture - Blood (collected 20 @ 08:21)  Source: .Blood None  Final Report (20 @ 13:01):    No Growth Final    Culture - Blood (collected 20 @ 08:21)  Source: .Blood None  Final Report (20 @ 13:01):    No Growth Final            INFECTIOUS DISEASES TESTING  Procalcitonin, Serum: 0.43 (20 @ 04:20)  Procalcitonin, Serum: 0.53 (20 @ 04:30)  Procalcitonin, Serum: 0.47 (20 @ 06:00)  Procalcitonin, Serum: 0.56 (20 @ 17:36)  Procalcitonin, Serum: 1.11 (04-15-20 @ 04:30)  Procalcitonin, Serum: 1.11 (20 @ 05:30)  COVID-19 PCR: Detected (20 @ 16:30)  Procalcitonin, Serum: 0.65 (20 @ 09:37)      INFLAMMATORY MARKERS  C-Reactive Protein, Serum: 2.64 mg/dL (20 @ 04:20)  C-Reactive Protein, Serum: 2.06 mg/dL (20 @ 04:30)  C-Reactive Protein, Serum: 4.50 mg/dL (20 @ 06:00)  C-Reactive Protein, Serum: 6.43 mg/dL (20 @ 17:36)      RADIOLOGY & ADDITIONAL TESTS:  I have personally reviewed the last available Chest xray  CXR      CT      CARDIOLOGY TESTING  12 Lead ECG:   Ventricular Rate 82 BPM    Atrial Rate 82 BPM    P-R Interval 210 ms    QRS Duration 138 ms    Q-T Interval 424 ms    QTC Calculation(Bezet) 495 ms    P Axis 51 degrees    R Axis -57 degrees    T Axis 42 degrees    Diagnosis Line Sinus rhythm with 1st degree A-V block  Right bundle branch block  Left anterior fascicular block  *** Bifascicular block ***  Anterolateral infarct , age undetermined  Abnormal ECG    Confirmed by Yuri Dugan ([id number]) on 2020 2:01:34 PM (20 @ 10:40)  12 Lead ECG:   Ventricular Rate 128 BPM    Atrial Rate 129 BPM    P-R Interval 96 ms    QRS Duration 122 ms    Q-T Interval 328 ms    QTC Calculation(Bezet) 478 ms    P Axis 70 degrees    R Axis -65 degrees    T Axis 13 degrees    Diagnosis Line Sinus tachycardiawith short LA  Left axis deviation  Right bundle branch block  Inferior infarct , age undetermined  Anterolateral infarct , age undetermined  Abnormal ECG    Confirmed by Laureano Cruz ([id number]) on 2020 10:29:54 PM (20 @ 20:47)      MEDICATIONS  artificial tears (preservative free) Ophthalmic Solution 1  aspirin  chewable 81  chlorhexidine 0.12% Liquid 15  chlorhexidine 4% Liquid 1  dexMEDEtomidine Infusion 0.2  fentaNYL   Infusion 0.5  folic acid 1  furosemide   Injectable 40  heparin   Injectable 7500  insulin glargine Injectable (LANTUS) 18  insulin lispro (HumaLOG) corrective regimen sliding scale   insulin lispro Injectable (HumaLOG) 6  norepinephrine Infusion 0.05  pantoprazole   Suspension 40  polyethylene glycol 3350 17  propofol Infusion 30  senna 2      WEIGHT  Weight (kg): 118 (20 @ 07:17)  Creatinine, Serum: 0.7 mg/dL (20 @ 04:30)      ANTIBIOTICS:      All available historical records have been reviewed

## 2020-05-03 NOTE — PROGRESS NOTE ADULT - ASSESSMENT
70y F w/ PMH of asthma (never hospitalized, never intubated), CAD s/p stent (07/2018), HTN, HLD, and DM (diet-controlled) presents from [facility] w/ the chief complaint of shortness of breath, fevers, chills for 1 week and has been progressively worsening.      # Hypoxic respiratory failure  - Intubated       - FiO2 80%, pressure 35/11, pulled ET tube 2 cm out   - Chest X-ray: stable b/l opacities   - Etiology may include:     > Given pandemic > COVID - 19       + COVID-19 PCR +ve      + Completed HCQ      + Kineret on 4/16      + Solumedrol 60mg IV q12h > dropped to 40qd on 4/16 and DCed on 4/24      + Started on Remdecivir on 4/23 for 10 days  - ID recs appreciated    - suspected GNR pneumonia viral pneumonia secondary     - s/p angeli vanco 	    - no need to repeat inflammatory markers   - RV dilated, PA pressure elevated, d-dimer elevated, possibility of PE    > Started on heparin therapeutic on 4/17    > DC plavix to decrease incidence of bleed, last stent placed >1 year ago  - Sedation Vacation today    # Afib  - transient episode of atrial fibrillation on morning of 4/23 > self resolved.    # BRIDGETTE   - Not known CKD, creatinine normal on admission   - c/w  Lasix 40mg IV qd  - DC vancomycin on 4/13  - f/u nephro     # Ischemic heart disease  - On DAPT on admission - Held aspirin after starting therapeutic anticoagulation on 4/17  - Metoprolol 50mg bid discontinued on 4/17 for hemodynamic stability  - On lasix 40mg QD   - Echo done showed LVEF 66%, mod RV dysfunction, PASP 58mmHg.     # DVT prophylaxis > On heparin therapeutic independent

## 2020-05-03 NOTE — PROGRESS NOTE ADULT - SUBJECTIVE AND OBJECTIVE BOX
Patient is a 70y old  Female who presents with a chief complaint of shortness of breath (02 May 2020 14:38)      Over Night Events:  Patient seen and examined.   still vneted on 60%     ROS:  See HPI    PHYSICAL EXAM    ICU Vital Signs Last 24 Hrs  T(C): 38 (03 May 2020 07:27), Max: 38 (03 May 2020 07:27)  T(F): 100.4 (03 May 2020 07:27), Max: 100.4 (03 May 2020 07:27)  HR: 99 (03 May 2020 07:27) (64 - 99)  BP: --  BP(mean): --  ABP: 140/48 (03 May 2020 07:27) (128/38 - 177/51)  ABP(mean): 69 (03 May 2020 07:27) (62 - 91)  RR: 27 (03 May 2020 07:27) (27 - 27)  SpO2: 99% (03 May 2020 07:27) (98% - 100%)      General: on sedation   HEENT:  tracheostomy              Lymph Nodes: NO cervical LN   Lungs: Bilateral BS  Cardiovascular: Regular   Abdomen: Soft, Positive BS  Extremities: No clubbing   Skin: warm   Neurological: no focal   Musculoskeletal: move all ext     I&O's Detail    02 May 2020 07:01  -  03 May 2020 07:00  --------------------------------------------------------  IN:    dexmedetomidine Infusion: 1063.2 mL    Enteral Tube Flush: 50 mL    fentaNYL  Infusion: 566.4 mL    Free Water: 1100 mL    norepinephrine Infusion: 430.6 mL    ns in tub fed  sqqari99: 1080 mL    propofol Infusion: 487.9 mL  Total IN: 4778.1 mL    OUT:    Indwelling Catheter - Urethral: 2660 mL    PEG (Percutaneous Endoscopic Gastrostomy) Tube: 180 mL  Total OUT: 2840 mL    Total NET: 1938.1 mL          LABS:                          7.5    11.02 )-----------( 154      ( 03 May 2020 04:30 )             22.9         03 May 2020 04:30    140    |  108    |  40     ----------------------------<  161    3.1     |  17     |  0.7      Ca    7.9        03 May 2020 04:30  Mg     1.9       03 May 2020 04:30    TPro  4.7    /  Alb  2.3    /  TBili  0.5    /  DBili  x      /  AST  14     /  ALT  8      /  AlkPhos  56     03 May 2020 04:30  Amylase x     lipase x                                                 PT/INR - ( 03 May 2020 04:30 )   PT: 14.10 sec;   INR: 1.23 ratio         PTT - ( 02 May 2020 13:30 )  PTT:30.1 sec                                       Urinalysis Basic - ( 02 May 2020 13:30 )    Color: Light Yellow / Appearance: Clear / S.011 / pH: x  Gluc: x / Ketone: Negative  / Bili: Negative / Urobili: <2 mg/dL   Blood: x / Protein: Negative / Nitrite: Negative   Leuk Esterase: Negative / RBC: x / WBC x   Sq Epi: x / Non Sq Epi: x / Bacteria: x                                                                                                             Mode: AC/ CMV (Assist Control/ Continuous Mandatory Ventilation)  RR (machine): 27  FiO2: 60  PEEP: 12  PS: 20  ITime: 0.8  PC: 32  PIP: 30                                      ABG - ( 03 May 2020 03:00 )  pH, Arterial: 7.44  pH, Blood: x     /  pCO2: 28    /  pO2: 199   / HCO3: 19    / Base Excess: -4.5  /  SaO2: 100                 MEDICATIONS  (STANDING):  artificial tears (preservative free) Ophthalmic Solution 1 Drop(s) Both EYES two times a day  aspirin  chewable 81 milliGRAM(s) Oral daily  chlorhexidine 0.12% Liquid 15 milliLiter(s) Oral Mucosa two times a day  chlorhexidine 4% Liquid 1 Application(s) Topical <User Schedule>  dexMEDEtomidine Infusion 0.2 MICROgram(s)/kG/Hr (5.9 mL/Hr) IV Continuous <Continuous>  fentaNYL   Infusion 0.5 MICROgram(s)/kG/Hr (5.9 mL/Hr) IV Continuous <Continuous>  folic acid 1 milliGRAM(s) Oral daily  furosemide   Injectable 40 milliGRAM(s) IV Push daily  heparin   Injectable 7500 Unit(s) SubCutaneous every 8 hours  insulin glargine Injectable (LANTUS) 18 Unit(s) SubCutaneous at bedtime  insulin lispro (HumaLOG) corrective regimen sliding scale   SubCutaneous three times a day before meals  insulin lispro Injectable (HumaLOG) 6 Unit(s) SubCutaneous every 6 hours  investigational medication - IVPB 100 milliGRAM(s) IV Intermittent <User Schedule>  norepinephrine Infusion 0.05 MICROgram(s)/kG/Min (11.1 mL/Hr) IV Continuous <Continuous>  pantoprazole   Suspension 40 milliGRAM(s) Oral daily  polyethylene glycol 3350 17 Gram(s) Oral two times a day  propofol Infusion 30 MICROgram(s)/kG/Min (21.2 mL/Hr) IV Continuous <Continuous>  senna 2 Tablet(s) Oral at bedtime    MEDICATIONS  (PRN):  acetaminophen    Suspension .. 650 milliGRAM(s) Enteral Tube every 6 hours PRN Temp greater or equal to 38C (100.4F)          Xrays:  TLC:  OG:  ET tube:                                                                                    pending    ECHO:  [facility] ICU:

## 2020-05-03 NOTE — PROGRESS NOTE ADULT - ASSESSMENT
70y F w/ PMH of asthma (never hospitalized, never intubated), CAD s/p stent (07/2018), HTN, HLD, and DM (diet-controlled) presents from [facility] w/ the chief complaint of shortness of breath, fevers, chills for 1 week and has been progressively worsening.      # Hypoxic respiratory failure  - Intubated       - FiO2 45%, pressure 26/10  - Chest X-ray: stable b/l opacities   - Etiology may include:     > Given pandemic > COVID - 19       + COVID-19 PCR +ve      + Completed HCQ      + Kineret on 4/16      + Solumedrol 60mg IV q12h > dropped to 40qd on 4/16 and DCed on 4/24      + Started on Remdecivir on 4/23 for 10 days  - ID recs appreciated    - suspected GNR pneumonia viral pneumonia secondary     - s/p angeli vanco 	    - no need to repeat inflammatory markers   - RV dilated, PA pressure elevated, d-dimer elevated, possibility of PE    > DC plavix to decrease incidence of bleed, last stent placed >1 year ago  - Sedation Vacation daily     - opened eyes to her name, failed neuro assessment   - CT Head 4/20: no evidence of acute pathology   - EEG- generalized slowing   - Discontinued Propofol due to acidosis, c/w fentanyl and Precedex   - Currently only on Levo, d/c vaso  - s/p Trach and PEG     #Anemia   - Hb 8.3 today, s/p one unit yesterday, responded appropriately   - CT abdomen: no evidence of retroperitoneal hematoma     # Afib  - transient episode of atrial fibrillation on morning of 4/23 > self resolved.    # BRIDGETTE   - Not known CKD, creatinine normal on admission   - c/w  Lasix 40mg IV qd  - DC vancomycin on 4/13  - f/u nephro   - 250ml Free water through NGT q 4   - D/C D5W     # Ischemic heart disease  - On DAPT on admission - Held aspirin after starting therapeutic anticoagulation on 4/17  - Metoprolol 50mg bid discontinued on 4/17 for hemodynamic stability  - On lasix 40mg QD   - Echo done showed LVEF 66%, mod RV dysfunction, PASP 58mmHg.     # DVT prophylaxis: heparin sub q

## 2020-05-03 NOTE — PROGRESS NOTE ADULT - ASSESSMENT
· Assessment		  70y F w/ PMH of asthma (never hospitalized, never intubated), CAD s/p stent (07/2018), HTN, HLD, and DM (diet-controlled) presents from [facility] w/ the chief complaint of shortness of breath, fevers, chills for 1 week and has been progressively worsening.    IMPRESSION;   COVID19 with resolved septic shock , resp failure, mechanical ventilation with ARDS with FiO2 50 %, secondary to Cytokine Release Syndrome  -s/p trach/PEG  -inflammatory markers elevated  -elevated Ddimer is a poor prognostic indicator and differentiate survivors from non survivors  -procalcitonin of 0.65 > 0.47 suggestive of a bacterial PNA/infection   -ferritin 102  -Ddimers 2411  -was on azithro/ merem in [place]   -BCx NG  s/p Anakinra    RECOMMENDATIONS;   send bcx given fever  Remdesivir since 4/23. 10 days in all  Daily CBC BMP PT PTT UA  S/p PLAQUENIL   s/p Anakinra  4/15-18  poor prognosis  anticoagulation

## 2020-05-03 NOTE — CHART NOTE - NSCHARTNOTEFT_GEN_A_CORE
Spoke with Adolfo Herrera at [phone] updated him on patient's status.   Answered all questions; addressed concerns

## 2020-05-04 NOTE — PROGRESS NOTE ADULT - SUBJECTIVE AND OBJECTIVE BOX
JEREMIAH, IRENE  70y, Female    All available historical data reviewed    OVERNIGHT EVENTS:  fevers  fio2 90%    ROS:  unable to obtain history secondary to patient's mental status and/or sedation     VITALS:  T(F): 100.7, Max: 101.9 (20 @ 18:43)  HR: 124  BP: --  RR: 27Vital Signs Last 24 Hrs  T(C): 38.2 (04 May 2020 09:14), Max: 38.8 (03 May 2020 18:43)  T(F): 100.7 (04 May 2020 09:14), Max: 101.9 (03 May 2020 18:43)  HR: 124 (04 May 2020 09:14) (109 - 139)  BP: --  BP(mean): --  RR: 27 (04 May 2020 09:14) (27 - 38)  SpO2: 93% (04 May 2020 09:14) (90% - 100%)    TESTS & MEASUREMENTS:                        7.5    33.73 )-----------( 204      ( 04 May 2020 05:10 )             25.0     05-04    139  |  105  |  42<H>  ----------------------------<  107<H>  4.5   |  16<L>  |  1.0    Ca    7.8<L>      04 May 2020 05:10  Mg     1.8     -    TPro  4.9<L>  /  Alb  2.3<L>  /  TBili  0.7  /  DBili  x   /  AST  20  /  ALT  10  /  AlkPhos  79  05-04    LIVER FUNCTIONS - ( 04 May 2020 05:10 )  Alb: 2.3 g/dL / Pro: 4.9 g/dL / ALK PHOS: 79 U/L / ALT: 10 U/L / AST: 20 U/L / GGT: x             Urinalysis Basic - ( 02 May 2020 13:30 )    Color: Light Yellow / Appearance: Clear / S.011 / pH: x  Gluc: x / Ketone: Negative  / Bili: Negative / Urobili: <2 mg/dL   Blood: x / Protein: Negative / Nitrite: Negative   Leuk Esterase: Negative / RBC: x / WBC x   Sq Epi: x / Non Sq Epi: x / Bacteria: x          RADIOLOGY & ADDITIONAL TESTS:  Personal review of radiological diagnostics performed  Echo and EKG results noted when applicable.     MEDICATIONS:  acetaminophen    Suspension .. 650 milliGRAM(s) Enteral Tube every 6 hours PRN  artificial tears (preservative free) Ophthalmic Solution 1 Drop(s) Both EYES two times a day  aspirin  chewable 81 milliGRAM(s) Oral daily  chlorhexidine 0.12% Liquid 15 milliLiter(s) Oral Mucosa two times a day  chlorhexidine 4% Liquid 1 Application(s) Topical <User Schedule>  dexMEDEtomidine Infusion 0.2 MICROgram(s)/kG/Hr IV Continuous <Continuous>  folic acid 1 milliGRAM(s) Oral daily  furosemide   Injectable 40 milliGRAM(s) IV Push daily  heparin   Injectable 7500 Unit(s) SubCutaneous every 8 hours  insulin glargine Injectable (LANTUS) 18 Unit(s) SubCutaneous at bedtime  insulin lispro (HumaLOG) corrective regimen sliding scale   SubCutaneous three times a day before meals  insulin lispro Injectable (HumaLOG) 6 Unit(s) SubCutaneous every 6 hours  norepinephrine Infusion 0.05 MICROgram(s)/kG/Min IV Continuous <Continuous>  pantoprazole   Suspension 40 milliGRAM(s) Oral daily  piperacillin/tazobactam IVPB.. 3.375 Gram(s) IV Intermittent every 6 hours  polyethylene glycol 3350 17 Gram(s) Oral two times a day  propofol Infusion 30 MICROgram(s)/kG/Min IV Continuous <Continuous>  senna 2 Tablet(s) Oral at bedtime  sodium bicarbonate  Infusion 0.044 mEq/kG/Hr IV Continuous <Continuous>  vancomycin  IVPB 1000 milliGRAM(s) IV Intermittent every 12 hours      ANTIBIOTICS:  piperacillin/tazobactam IVPB.. 3.375 Gram(s) IV Intermittent every 6 hours  vancomycin  IVPB 1000 milliGRAM(s) IV Intermittent every 12 hours

## 2020-05-04 NOTE — PROGRESS NOTE ADULT - ASSESSMENT
70y F w/ PMH of asthma (never hospitalized, never intubated), CAD s/p stent (07/2018), HTN, HLD, and DM (diet-controlled) presents from [facility] w/ the chief complaint of shortness of breath, fevers, chills for 1 week and has been progressively worsening.    #COVID 19 with sepsis ( WBC 33.7 w/ 9 bands, T 101.5)  - f/u bcx, urine cx, Deep ET cultures, f/u  - d/c left IJ catheter   - Vancomycin 1250 mg iv q12  - meropenem 1gm iv q 8  - caspofungin 70mg IV x1 today and from 5/5 50 mg iv q24   -d/c zosyn   - replacing TLC today, removing previous IJ     # Hypoxic respiratory failure  - Intubated       - FiO2 85%, pressure 29/10  - Chest X-ray: stable b/l opacities   - Etiology may include:     > Given pandemic > COVID - 19       + COVID-19 PCR +ve      + Completed HCQ      + Kineret on 4/16      + Solumedrol 60mg IV q12h > dropped to 40qd on 4/16 and DCed on 4/24      + Started on Remdecivir on 4/23 for 10 days  - ID recs appreciated    - suspected GNR pneumonia viral pneumonia secondary     - s/p angeli vanco 	    - no need to repeat inflammatory markers   - RV dilated, PA pressure elevated, d-dimer elevated, possibility of PE    > DC plavix to decrease incidence of bleed, last stent placed >1 year ago  - Sedation Vacation daily     - opened eyes to her name, failed neuro assessment   - CT Head 4/20: no evidence of acute pathology   - EEG- generalized slowing   - d/c propofol c/w precedex   - Currently only on Levo, d/c vaso  - s/p Trach and PEG   - bicarb drip 70cc/ hr for now    #Anemia   - Hb 8.3 today, s/p one unit yesterday, responded appropriately   - CT abdomen: no evidence of retroperitoneal hematoma     # Afib  - transient episode of atrial fibrillation on morning of 4/23 > self resolved.    # BRIDGETTE   - Not known CKD, creatinine normal on admission   - c/w  Lasix 40mg IV qd  - DC vancomycin on 4/13  - f/u nephro   - LR 75 cc/ hr       # Ischemic heart disease  - On DAPT on admission - Held aspirin after starting therapeutic anticoagulation on 4/17  - Metoprolol 50mg bid discontinued on 4/17 for hemodynamic stability  - On lasix 40mg QD   - Echo done showed LVEF 66%, mod RV dysfunction, PASP 58mmHg.     # DVT prophylaxis: heparin sub q

## 2020-05-04 NOTE — ED ADULT NURSE REASSESSMENT NOTE - NS ED NURSE REASSESS COMMENT FT1
pt is sedated, on ventilator, with precedex, heparin, and remdesivir continuously infusing, pt NSR on cardiac monitor, nad, will continue to monitor
@ 0800 during rounds, MD requests to decrease pt's sedation on propofol and wean down pt. Prop originally @ 30mcg/kg/hr since pt nurse report @ 0715, as of 0800, prop titrated down to 20mcg/kg/hr
Pt elevated temp 102 aux and tqachycardia @ 140bpm. Ordered tyenol suppository, toradol, and IV antibotics given. Provider made aware. Ordered to place patient on cooling blanket. Provider made aware of pt's low urinary output. Provider order for 500 LR bolus followed by infusion. Will continue to monitor. IV drips infusing. Pt placed on cooling blanket
Pt report received @ 0715. Pt received on dexmed 1.5mcg/kg/hr, propofol 30mcg/kg/hr, levo 0.28mcg/kg/hr. Pt vitals charted @ 0715. IV dressings and arterial line dressing intact clean and dry. No pt distress observed.

## 2020-05-04 NOTE — DISCHARGE NOTE FOR THE EXPIRED PATIENT - NS PATIENT DEATH CRITERIA
Signed and faxed back   Patient is dead based on Cardiopulmonary criteria including absent breath sounds, pulselessness and/or asystole

## 2020-05-04 NOTE — PROGRESS NOTE ADULT - ASSESSMENT
70y F w/ PMH of asthma (never hospitalized, never intubated), CAD s/p stent (07/2018), HTN, HLD, and DM (diet-controlled) presents from [facility] w/ the chief complaint of shortness of breath, fevers, chills for 1 week and has been progressively worsening.    Impression   ARF hypoxic   -r/o COVID  -Suspected GNR PNA  viral PNA secondary   -Asthma Exacerbation  ARDS  BRIDGETTE    Plan     1.CNS   on sedation hold fentanyl and propofol keep precedex   can give fentanyl or morphine for paon    2. CVS     echo 66% moderate LVH , puln htn ?? rv dilatation        3. PULMONARY   s/p trach   INCREASE 29/10 RATE TO 30    and fio2 85  FREQUENT SUCTION   WAS SUCTIONED THIS MORNING   rate 27   REPEAT CXR   abG 2 HRS     4. INFECTIOUS DISEASE   START zOSYN AND VANCO   DTA PAN CX   FOLLOW id     s/p [email or web address]   S/P    remdisiver study   follow up Id recommendation     5. GI  -GI ppx, protonix  check LFT   free water 250cc Q 4  hrs     6. RENAL  -replete electrolytes PRN  follow renal   START BICARB DRIP 70-CC/ HR FOR NOW FOLOW BICARB LEVEL AND ph   OMBINED METABOLIC AND RESP ACIDOSIS   on lasix Q 24 hrs    monitor Is and OS       7. Endocrine   -f/u FS, target FS <180    8. Hematology  follow h/h  is stable heparin Sq 7500 Q 8 hrs   h/h afternoon     11. Code Status   -full code 70y F w/ PMH of asthma (never hospitalized, never intubated), CAD s/p stent (07/2018), HTN, HLD, and DM (diet-controlled) presents from [facility] w/ the chief complaint of shortness of breath, fevers, chills for 1 week and has been progressively worsening.    Impression   ARF hypoxic   -r/o COVID  -Suspected GNR PNA  viral PNA secondary   -Asthma Exacerbation  ARDS  BRIDGETTE    Plan     1.CNS   on sedation hold fentanyl and propofol keep precedex   can give fentanyl or morphine for paon    2. CVS     echo 66% moderate LVH , puln htn ?? rv dilatation        3. PULMONARY   s/p trach   INCREASE 29/10 RATE TO 30    and fio2 85  FREQUENT SUCTION   WAS SUCTIONED THIS MORNING   rate 27   REPEAT CXR   abG 2 HRS     4. INFECTIOUS DISEASE   START zOSYN AND VANCO   DTA PAN CX   FOLLOW ID   send [email or web address]   s/p Shanna   S/P    remdisiver study   follow up Id recommendation     5. GI  -GI ppx, protonix  check LFT   free water 250cc Q 4  hrs     6. RENAL  -replete electrolytes PRN  follow renal   START BICARB DRIP 70-CC/ HR FOR NOW FOLOW BICARB LEVEL AND ph   OMBINED METABOLIC AND RESP ACIDOSIS   on lasix Q 24 hrs    monitor Is and OS       7. Endocrine   -f/u FS, target FS <180    8. Hematology  follow h/h  is stable heparin Sq 7500 Q 8 hrs   h/h afternoon     11. Code Status   -full code 70y F w/ PMH of asthma (never hospitalized, never intubated), CAD s/p stent (07/2018), HTN, HLD, and DM (diet-controlled) presents from [facility] w/ the chief complaint of shortness of breath, fevers, chills for 1 week and has been progressively worsening.    Impression   ARF hypoxic   -r/o COVID  -Suspected GNR PNA  viral PNA secondary   -Asthma Exacerbation  ARDS  BRIDGETTE    Plan     1.CNS   on sedation hold fentanyl and propofol keep precedex   can give fentanyl or morphine for paon    2. CVS     echo 66% moderate LVH , puln htn ?? rv dilatation        3. PULMONARY   s/p trach   INCREASE 29/10 RATE TO 30    and fio2 85  FREQUENT SUCTION mucopurulent secretion  WAS SUCTIONED THIS MORNING   rate 27   REPEAT CXR   abG 2 HRS     4. INFECTIOUS DISEASE   START zOSYN AND VANCO   DTA PAN CX   FOLLOW ID   send hayleyitell   s/p ANAKirna   S/P    remdisiver study   follow up Id recommendation     5. GI  -GI ppx, protonix  check LFT   free water 250cc Q 4  hrs     6. RENAL  -replete electrolytes PRN  follow renal   START BICARB DRIP 70-CC/ HR FOR NOW FOLOW BICARB LEVEL AND ph   OMBINED METABOLIC AND RESP ACIDOSIS   on lasix Q 24 hrs    monitor Is and OS       7. Endocrine   -f/u FS, target FS <180    8. Hematology  follow h/h  is stable heparin Sq 7500 Q 8 hrs   h/h afternoon     11. Code Status   -full code

## 2020-05-04 NOTE — PROGRESS NOTE ADULT - ASSESSMENT
· Assessment		  70y F w/ PMH of asthma (never hospitalized, never intubated), CAD s/p stent (07/2018), HTN, HLD, and DM (diet-controlled) presents from [facility] w/ the chief complaint of shortness of breath, fevers, chills for 1 week and has been progressively worsening.    IMPRESSION;   COVID19 with sepsis ( WBC 33.7 with 9 bands, T 101.5 ) , resp failure, mechanical ventilation with ARDS with FiO2 90 %, secondary to Cytokine Release Syndrome  -s/p trach/PEG  -inflammatory markers elevated  -elevated Ddimer is a poor prognostic indicator and differentiate survivors from non survivors  -procalcitonin of 0.65 > 0.47 suggestive of a bacterial PNA/infection   -ferritin 102  -Ddimers 2411  -was on azithro/ merem in [place]   -BCx NG  s/p Anakinra    RECOMMENDATIONS;  BCx   UA/UCx  deep ET cultures  D/c Left IJ catheter  Remdesivir  4/23-5/3  S/p PLAQUENIL   s/p Anakinra  4/15-18  Vancomycin 1250 mg iv q12h  meropenem 1 gm iv q8h  d/c Zosyn  serum fungitell assay  caspofungin 70 mg iv x once today and from 5/5 50 mg iv q24h ( hold if fungitell NG )  poor prognosis  anticoagulation

## 2020-05-04 NOTE — PROGRESS NOTE ADULT - SUBJECTIVE AND OBJECTIVE BOX
Patient is a 70y old  Female who presents with a chief complaint of shortness of breath (03 May 2020 20:22)      Over Night Events:  Patient seen and examined.   on sedation , FEVER, SECRETION IN TRACHEOSTOMY     ROS:  See HPI    PHYSICAL EXAM    ICU Vital Signs Last 24 Hrs  T(C): 37.7 (03 May 2020 23:19), Max: 38.8 (03 May 2020 18:43)  T(F): 99.9 (03 May 2020 23:19), Max: 101.9 (03 May 2020 18:43)  HR: 126 (04 May 2020 07:15) (109 - 139)  BP: --  BP(mean): --  ABP: 117/56 (04 May 2020 07:15) (90/47 - 147/57)  ABP(mean): 74 (04 May 2020 07:15) (59 - 78)  RR: 27 (04 May 2020 07:15) (27 - 38)  SpO2: 90% (04 May 2020 07:15) (90% - 100%)      General: ON   HEENT:                Lymph Nodes: NO cervical LN   Lungs: Bilateral BS  Cardiovascular: Regular   Abdomen: Soft, Positive BS  Extremities: No clubbing   Skin: warm   Neurological:   Musculoskeletal: move all ext     I&O's Detail    03 May 2020 07:01  -  04 May 2020 07:00  --------------------------------------------------------  IN:    dexmedetomidine Infusion: 959.3 mL    Enteral Tube Flush: 100 mL    Free Water: 600 mL    IV PiggyBack: 350 mL    norepinephrine Infusion: 908.4 mL    ns in tub fed  nibtyj27: 360 mL    propofol Infusion: 283.2 mL    propofol Infusion: 339 mL  Total IN: 3899.9 mL    OUT:    Indwelling Catheter - Urethral: 798 mL  Total OUT: 798 mL    Total NET: 3101.9 mL          LABS:                          7.5    33.73 )-----------( 204      ( 04 May 2020 05:10 )             25.0         04 May 2020 05:10    139    |  105    |  42     ----------------------------<  107    4.5     |  16     |  1.0      Ca    7.8        04 May 2020 05:10  Mg     1.8       04 May 2020 05:10    TPro  4.9    /  Alb  2.3    /  TBili  0.7    /  DBili  x      /  AST  20     /  ALT  10     /  AlkPhos  79     04 May 2020 05:10  Amylase x     lipase x                                                 PT/INR - ( 03 May 2020 04:30 )   PT: 14.10 sec;   INR: 1.23 ratio         PTT - ( 02 May 2020 13:30 )  PTT:30.1 sec                                       Urinalysis Basic - ( 02 May 2020 13:30 )    Color: Light Yellow / Appearance: Clear / S.011 / pH: x  Gluc: x / Ketone: Negative  / Bili: Negative / Urobili: <2 mg/dL   Blood: x / Protein: Negative / Nitrite: Negative   Leuk Esterase: Negative / RBC: x / WBC x   Sq Epi: x / Non Sq Epi: x / Bacteria: x                                                                                                                                                 ABG - ( 04 May 2020 01:08 )  pH, Arterial: 7.16  pH, Blood: x     /  pCO2: 52    /  pO2: 116   / HCO3: 18    / Base Excess: -9.8  /  SaO2: 99                  MEDICATIONS  (STANDING):  artificial tears (preservative free) Ophthalmic Solution 1 Drop(s) Both EYES two times a day  aspirin  chewable 81 milliGRAM(s) Oral daily  chlorhexidine 0.12% Liquid 15 milliLiter(s) Oral Mucosa two times a day  chlorhexidine 4% Liquid 1 Application(s) Topical <User Schedule>  dexMEDEtomidine Infusion 0.2 MICROgram(s)/kG/Hr (5.9 mL/Hr) IV Continuous <Continuous>  folic acid 1 milliGRAM(s) Oral daily  furosemide   Injectable 40 milliGRAM(s) IV Push daily  heparin   Injectable 7500 Unit(s) SubCutaneous every 8 hours  insulin glargine Injectable (LANTUS) 18 Unit(s) SubCutaneous at bedtime  insulin lispro (HumaLOG) corrective regimen sliding scale   SubCutaneous three times a day before meals  insulin lispro Injectable (HumaLOG) 6 Unit(s) SubCutaneous every 6 hours  norepinephrine Infusion 0.05 MICROgram(s)/kG/Min (11.1 mL/Hr) IV Continuous <Continuous>  pantoprazole   Suspension 40 milliGRAM(s) Oral daily  polyethylene glycol 3350 17 Gram(s) Oral two times a day  propofol Infusion 30 MICROgram(s)/kG/Min (21.2 mL/Hr) IV Continuous <Continuous>  senna 2 Tablet(s) Oral at bedtime    MEDICATIONS  (PRN):  acetaminophen    Suspension .. 650 milliGRAM(s) Enteral Tube every 6 hours PRN Temp greater or equal to 38C (100.4F)          Xrays:  TLC:  OG:  ET tube:                                                                                    OPACITY LEFT SIDE    ECHO:  CAM ICU:

## 2020-05-04 NOTE — PROGRESS NOTE ADULT - REASON FOR ADMISSION

## 2020-05-04 NOTE — PROGRESS NOTE ADULT - SUBJECTIVE AND OBJECTIVE BOX
SUBJECTIVE:    Patient is a 70y old  Female who presents with a chief complaint of shortness of breath (04 May 2020 09:57)    Currently admitted to medicine with the primary diagnosis of Asthma exacerbation     Today is hospital day 21d. Patient was seen and examined at bedside. Sedated, intubated     PAST MEDICAL & SURGICAL HISTORY  PAST MEDICAL & SURGICAL HISTORY:  Depression  Anxiety  Dyslipidemia  Diabetes mellitus  Hypertension    SOCIAL HISTORY:    ALLERGIES:  No Known Allergies    MEDICATIONS:  STANDING MEDICATIONS  artificial tears (preservative free) Ophthalmic Solution 1 Drop(s) Both EYES two times a day  aspirin  chewable 81 milliGRAM(s) Oral daily  chlorhexidine 0.12% Liquid 15 milliLiter(s) Oral Mucosa every 12 hours  chlorhexidine 0.12% Liquid 15 milliLiter(s) Oral Mucosa two times a day  chlorhexidine 4% Liquid 1 Application(s) Topical <User Schedule>  dexMEDEtomidine Infusion 0.2 MICROgram(s)/kG/Hr IV Continuous <Continuous>  folic acid 1 milliGRAM(s) Oral daily  furosemide   Injectable 40 milliGRAM(s) IV Push daily  heparin   Injectable 7500 Unit(s) SubCutaneous every 8 hours  insulin glargine Injectable (LANTUS) 18 Unit(s) SubCutaneous at bedtime  insulin lispro (HumaLOG) corrective regimen sliding scale   SubCutaneous three times a day before meals  insulin lispro Injectable (HumaLOG) 6 Unit(s) SubCutaneous every 6 hours  lactated ringers Bolus 500 milliLiter(s) IV Bolus once  lactated ringers. 1000 milliLiter(s) IV Continuous <Continuous>  meropenem  IVPB 1000 milliGRAM(s) IV Intermittent every 8 hours  norepinephrine Infusion 0.05 MICROgram(s)/kG/Min IV Continuous <Continuous>  pantoprazole   Suspension 40 milliGRAM(s) Oral daily  polyethylene glycol 3350 17 Gram(s) Oral two times a day  propofol Infusion 30 MICROgram(s)/kG/Min IV Continuous <Continuous>  senna 2 Tablet(s) Oral at bedtime  sodium bicarbonate  Infusion 0.044 mEq/kG/Hr IV Continuous <Continuous>  vancomycin  IVPB        PRN MEDICATIONS  acetaminophen    Suspension .. 650 milliGRAM(s) Enteral Tube every 6 hours PRN    VITALS:   T(F): 101.2  HR: 135  BP: --  RR: 27  SpO2: 93%    LABS:                        7.5    33.73 )-----------( 204      ( 04 May 2020 05:10 )             25.0     05-04    139  |  105  |  42<H>  ----------------------------<  107<H>  4.5   |  16<L>  |  1.0    Ca    7.8<L>      04 May 2020 05:10  Mg     1.8     05-04    TPro  4.9<L>  /  Alb  2.3<L>  /  TBili  0.7  /  DBili  x   /  AST  20  /  ALT  10  /  AlkPhos  79  05-04    PT/INR - ( 03 May 2020 04:30 )   PT: 14.10 sec;   INR: 1.23 ratio             ABG - ( 04 May 2020 10:52 )  pH, Arterial: 7.25  pH, Blood: x     /  pCO2: 38    /  pO2: 74    / HCO3: 16    / Base Excess: -10.1 /  SaO2: 95                        RADIOLOGY:  < from: Xray Chest 1 View- PORTABLE-Routine (05.04.20 @ 05:37) >    Impression:     Worsening of bilateral lung opacities.    < end of copied text >    PHYSICAL EXAM:  GENERAL: sedated, intubated  HEAD: Atraumatic  CHEST/LUNG: diminished breath sounds b/l   HEART: S1, S2; RRR; No murmurs, rubs, or gallops  ABDOMEN: BS+; Soft  EXTREMITIES:  2+ Peripheral Pulses

## 2020-05-04 NOTE — DISCHARGE NOTE FOR THE EXPIRED PATIENT - HOSPITAL COURSE
70y F w/ PMH of asthma (never hospitalized, never intubated), CAD s/p stent (07/2018), HTN, HLD, and DM (diet-controlled) presents from [facility] w/ the chief complaint of shortness of breath, fevers, chills for 1 week and has been progressively worsening.  Code blue called. Patient found pulseless. Time of death 9:46 pm    #COVID 19 with sepsis ( WBC 33.7 w/ 9 bands, T 101.5)  - Vancomycin 1250 mg iv q12  - meropenem 1gm iv q 8  - caspofungin 70mg IV x1 today and from 5/5 50 mg iv q24     # Hypoxic respiratory failure      + COVID-19 PCR +ve      + Completed HCQ      + Kineret on 4/16      + Solumedrol 60mg IV q12h > dropped to 40qd on 4/16 and DCed on 4/24      + Started on Remdecivir on 4/23 for 10 days  - ID recs appreciated    - suspected GNR pneumonia viral pneumonia secondary     - s/p angeli vanco 	  - RV dilated, PA pressure elevated, d-dimer elevated, possibility of PE  - Sedation Vacation daily     - opened eyes to her name, failed neuro assessment   - CT Head 4/20: no evidence of acute pathology   - EEG- generalized slowing   - s/p Trach and PEG   - bicarb drip 70cc/ hr for now    # Afib  # BRIDGETTE   # Ischemic heart disease

## 2020-05-05 LAB — SURGICAL PATHOLOGY STUDY: SIGNIFICANT CHANGE UP

## 2020-05-06 LAB — FUNGITELL: <31 PG/ML — SIGNIFICANT CHANGE UP

## 2020-05-09 LAB
CULTURE RESULTS: SIGNIFICANT CHANGE UP
SPECIMEN SOURCE: SIGNIFICANT CHANGE UP

## 2021-01-27 NOTE — PROGRESS NOTE ADULT - ASSESSMENT
Asking for EKG tracing to be faxed to [phone]    Pt has surgery on 2/4 70y F w/ PMH of asthma (never hospitalized, never intubated), CAD s/p stent (07/2018), HTN, HLD, and DM (diet-controlled) presents from [facility] w/ the chief complaint of shortness of breath, fevers, chills for 1 week and has been progressively worsening.      # Hypoxic respiratory failure  - Intubated, PCV I30/E15/R25/80%  - Etiology may include:     > Given pandemic > COVID - 19       + COVID-19 PCR +ve      + On HCQ hoping it would help      + Started Kineret on 4/16      + Solumedrol 60mg IV q12h > dropped to 40qd on 4/16  - Inflammatory biomarkers > CRP 24 (was 20),  (was 432), Ferritin 1599 (was 1111) >> after kineret >> CRP downtrending now 4.5, ferritin 1100    > Bacterial LRTI superinfection      + On meropenem and vancomycin (dose adjusted according to clerance) ( Vancomycin level 20, on hold due to BRIDGETTE since 4/13)      + Pending DTA cultures  - RV dilated, PA pressure elevated, d-dimer elevated, possibility of PE    > Started on heparin therapeutic on 4/17    > DC plavix to decrease incidence of bleed, last stent placed >1 year ago    # BRIDGETTE   - Not known CKD, creatinine normal on admission   - Non-oliguric for now, receiving Lasix 40mg IV qd  - ATN? Drug induced? Ameliorating  - DC vancomycin on 4/13  - Will follow with nephrology    # Ischemic heart disease  - On DAPT on admission - Held aspirin after starting therapeutic anticoagulation on 4/17  - Metoprolol 50mg bid discontinued on 4/17 for hemodynamic stability  - On lasix IV bid  - Echo done showed LVEF 66%, mod RV dysfunction, PASP 58mmHg.     # DVT prophylaxis > On heparin therapeutic

## 2021-02-02 NOTE — CONSULT NOTE ADULT - CONSULT REASON
BRIDGETTE Otolaryngologist Procedure Text (A): After obtaining clear surgical margins the patient was sent to otolaryngology for surgical repair.  The patient understands they will receive post-surgical care and follow-up from the referring physician's office.

## 2021-11-03 NOTE — ED PROCEDURE NOTE - CPROC ED TIME OUT STATEMENT1
“Patient's name, , procedure and correct site were confirmed during the [place] Timeout.”
“Patient's name, , procedure and correct site were confirmed during the [place] Timeout.”
n/a
